# Patient Record
Sex: FEMALE | Race: BLACK OR AFRICAN AMERICAN | NOT HISPANIC OR LATINO | Employment: OTHER | URBAN - METROPOLITAN AREA
[De-identification: names, ages, dates, MRNs, and addresses within clinical notes are randomized per-mention and may not be internally consistent; named-entity substitution may affect disease eponyms.]

---

## 2022-02-09 ENCOUNTER — HOSPITAL ENCOUNTER (EMERGENCY)
Facility: HOSPITAL | Age: 57
Discharge: HOME | End: 2022-02-09
Attending: EMERGENCY MEDICINE | Admitting: EMERGENCY MEDICINE

## 2022-02-09 VITALS
DIASTOLIC BLOOD PRESSURE: 87 MMHG | OXYGEN SATURATION: 96 % | SYSTOLIC BLOOD PRESSURE: 139 MMHG | RESPIRATION RATE: 18 BRPM | HEART RATE: 72 BPM | TEMPERATURE: 96.8 F

## 2022-02-09 DIAGNOSIS — L02.213 ABSCESS OF PARASTERNAL CHEST WALL: Primary | ICD-10-CM

## 2022-02-09 DIAGNOSIS — U07.1 COVID-19: ICD-10-CM

## 2022-02-09 DIAGNOSIS — Z28.21 COVID-19 VACCINE DOSE DECLINED: ICD-10-CM

## 2022-02-09 PROCEDURE — 99282 EMERGENCY DEPT VISIT SF MDM: CPT

## 2022-02-09 RX ORDER — SULFAMETHOXAZOLE AND TRIMETHOPRIM 800; 160 MG/1; MG/1
1 TABLET ORAL 2 TIMES DAILY
Qty: 14 TABLET | Refills: 0 | Status: SHIPPED | OUTPATIENT
Start: 2022-02-09 | End: 2022-02-16

## 2022-02-09 RX ORDER — LIDOCAINE HYDROCHLORIDE AND EPINEPHRINE 10; 10 MG/ML; UG/ML
10 INJECTION, SOLUTION INFILTRATION; PERINEURAL ONCE
Status: COMPLETED | OUTPATIENT
Start: 2022-02-09 | End: 2022-02-09

## 2022-02-09 RX ADMIN — LIDOCAINE HYDROCHLORIDE,EPINEPHRINE BITARTRATE 10 ML: 10; .01 INJECTION, SOLUTION INFILTRATION; PERINEURAL at 16:10

## 2022-02-09 NOTE — DISCHARGE INSTRUCTIONS
Warm soaks to the chest wall 4-5 times a day for next 4 days    Take antibiotics as prescribed    Follow up with General Surgery for evaluation    Return Precautions    Although you are being discharged from the ED today, I encourage you to return for worsening symptoms.  Things can, and do, change such that treatment at home with medication may not be adequate.      Specifically, return for any of the following:    Chest pain, shortness of breath, pain or nausea and vomiting not controlled by medications provided.    Please make a follow up with your Primary Care Provider for a blood pressure recheck.

## 2022-02-09 NOTE — ED TRIAGE NOTES
Pt states that she has an abscess between her breasts that started 1 year ago and has gradually increased in size. States that it is now painful. Pt was COVID positive 5 days ago. Patient masked at arrival and triage staff wore all appropriate PPE during entire encounter with patient.

## 2022-02-09 NOTE — ED PROVIDER NOTES
"EMERGENCY DEPARTMENT ENCOUNTER    Room Number:  A01/01  Date seen:  2/9/2022  Time seen: 15:59 EST  PCP: Provider, No Known  Historian: patient    HPI:  Chief complaint: breast cyst  A complete HPI/ROS/PMH/PSH/SH/FH are unobtainable due to: n/a  Context:Yenny Crawford is a 56 y.o. female who presents to the ED with c/o an infected blackhead to the area beneath her breasts that has been a problem on and off.  States that over the past week it has become more painful, reddened and inflamed after she \"popped\" recently.  She has no h/o abnormal mammograms and her breasts are unaffected.  She has no other complaints. She mentions she was diagnosed with covid 19 five days ago but denies any problems breathing.     Patient was placed in face mask in first look. Patient was wearing facemask when I entered the room and throughout our encounter. I wore full protective equipment throughout this patient encounter including a N95 face mask, eye shield and gloves. Hand hygiene/washing of hands was performed before donning protective equipment and after removal when leaving the room.    MEDICAL RECORD REVIEW    ALLERGIES  Patient has no known allergies.    PAST MEDICAL HISTORY  Active Ambulatory Problems     Diagnosis Date Noted   • No Active Ambulatory Problems     Resolved Ambulatory Problems     Diagnosis Date Noted   • No Resolved Ambulatory Problems     No Additional Past Medical History       PAST SURGICAL HISTORY  No past surgical history on file.    FAMILY HISTORY  No family history on file.    SOCIAL HISTORY  Social History     Socioeconomic History   • Marital status:        REVIEW OF SYSTEMS  Review of Systems    All systems reviewed and negative except for those discussed in HPI.     PHYSICAL EXAM    ED Triage Vitals   Temp Heart Rate Resp BP SpO2   02/09/22 1455 02/09/22 1455 02/09/22 1455 02/09/22 1458 02/09/22 1455   96.8 °F (36 °C) 88 18 135/96 96 %      Temp src Heart Rate Source Patient Position BP Location " FiO2 (%)   02/09/22 1455 02/09/22 1455 02/09/22 1458 -- --   Tympanic Monitor Standing       Physical Exam  Chest:          Comments: Circular inflamed, erythematous blackhead that is fluctuant.         I have reviewed the triage vital signs and nursing notes.      GENERAL: not distressed, mildly anxious  HENT: nares patent  EYES: no scleral icterus  NECK: no ROM limitations  CV: regular rhythm, regular rate, no murmur, no rubs, no gallups  RESPIRATORY: normal effort, CTAB, able to speak in full sentences  ABDOMEN: soft  : deferred  MUSCULOSKELETAL: no deformity  NEURO: alert, moves all extremities, follows commands  SKIN: warm, dry    Incision & Drainage    Date/Time: 2/9/2022 4:27 PM  Performed by: Barby Mcmahan APRN  Authorized by: Slim Hough MD     Consent:     Consent obtained:  Verbal    Consent given by:  Patient    Risks discussed:  Bleeding, incomplete drainage, pain and infection    Alternatives discussed:  No treatment  Location:     Type:  Abscess    Location:  Trunk    Trunk location:  Chest  Pre-procedure details:     Skin preparation:  Chloraprep  Anesthesia (see MAR for exact dosages):     Anesthesia method:  Local infiltration    Local anesthetic:  Lidocaine 1% WITH epi  Procedure type:     Complexity:  Simple  Procedure details:     Incision types:  Stab incision    Incision depth:  Subcutaneous    Scalpel blade:  11    Wound management:  Probed and deloculated, irrigated with saline and extensive cleaning    Drainage:  Bloody and purulent    Drainage amount:  Moderate    Wound treatment:  Wound left open    Packing materials:  None  Post-procedure details:     Patient tolerance of procedure:  Tolerated well, no immediate complications          LAB RESULTS  No results found for this or any previous visit (from the past 24 hour(s)).      RADIOLOGY RESULTS  No Radiology Exams Resulted Within Past 24 Hours       PROGRESS, DATA ANALYSIS, CONSULTS AND MEDICAL DECISION MAKING  All labs  have been independently reviewed by me.  All radiology studies have been reviewed by me and discussed with radiologist dictating the report.  EKG's independently viewed and interpreted by me unless stated otherwise. Discussion below represents my analysis of pertinent findings related to patient's condition, differential diagnosis, treatment plan and final disposition.      DDX: infected sebaceous cyst, cellulitis, abscess chest wall    MDM: The patient had immediate relief after I lanced and drained and irrigated this abscess.  I did spend a lengthy time with her discussing the importance of follow-up with general surgery for formal excision.  I placed her on some Bactrim DS    Reviewed pt's history and workup with Dr. Hough.  After a bedside evaluation, Dr. Hough agrees with the plan of care.    The patient's history, physical exam, and lab findings were discussed with the physician, who also performed a face to face history and physical exam.  I discussed all results and noted any abnormalities with patient.  Discussed absoute need to recheck abnormalities with their family physician.  I answered any of the patient's questions.  Discussed plan for discharge, as there is no emergent indication for admission.  Pt is agreeable and understands need for follow up and repeat testing.  Pt is aware that discharge does not mean that nothing is wrong but it indicates no emergency is present and they must continue care with their family physician.  Pt is discharged with instructions to follow up with primary care doctor to have their blood pressure rechecked.         Disposition vitals:  /87   Pulse 72   Temp 96.8 °F (36 °C) (Tympanic)   Resp 18   SpO2 96%       DIAGNOSIS  Final diagnoses:   Abscess of parasternal chest wall   COVID-19   COVID-19 vaccine dose declined       FOLLOW UP   PATIENT CONNECTION - [facility] [id number] [phone]  Schedule an appointment as soon as possible for a  visit       Anuj Maria MD  [address] [place] [id number]  [phone]    Schedule an appointment as soon as possible for a visit in 1 week  for evaluation and consideration of formal excision of recurrent cyst         Barby Mcmahan, APRN  02/09/22 9628

## 2022-02-09 NOTE — ED NOTES
Pt c/o possible abscess to sternal area. Pt states that it started as black head approx 1.5yrs ago. Pt states that she attempted to pop it and it started to  Swell. Pt has redness to area    This RN wore mask and goggles during time of contact       Huyen Rowley, RN  02/09/22 1600

## 2023-08-15 ENCOUNTER — TRANSCRIBE ORDERS (OUTPATIENT)
Dept: ADMINISTRATIVE | Facility: HOSPITAL | Age: 58
End: 2023-08-15
Payer: COMMERCIAL

## 2023-08-15 DIAGNOSIS — M25.561 ACUTE PAIN OF BOTH KNEES: Primary | ICD-10-CM

## 2023-08-15 DIAGNOSIS — M25.562 ACUTE PAIN OF BOTH KNEES: Primary | ICD-10-CM

## 2023-08-25 ENCOUNTER — LAB (OUTPATIENT)
Dept: LAB | Facility: HOSPITAL | Age: 58
End: 2023-08-25
Payer: COMMERCIAL

## 2023-08-25 ENCOUNTER — HOSPITAL ENCOUNTER (OUTPATIENT)
Dept: NUCLEAR MEDICINE | Facility: HOSPITAL | Age: 58
Discharge: HOME | End: 2023-08-25
Payer: COMMERCIAL

## 2023-08-25 ENCOUNTER — TRANSCRIBE ORDERS (OUTPATIENT)
Dept: LAB | Facility: HOSPITAL | Age: 58
End: 2023-08-25
Payer: COMMERCIAL

## 2023-08-25 ENCOUNTER — APPOINTMENT (OUTPATIENT)
Dept: NUCLEAR MEDICINE | Facility: HOSPITAL | Age: 58
End: 2023-08-25
Payer: COMMERCIAL

## 2023-08-25 DIAGNOSIS — Z96.653 KNEE JOINT REPLACEMENT STATUS, BILATERAL: Primary | ICD-10-CM

## 2023-08-25 DIAGNOSIS — M25.562 ACUTE PAIN OF BOTH KNEES: ICD-10-CM

## 2023-08-25 DIAGNOSIS — Z96.653 KNEE JOINT REPLACEMENT STATUS, BILATERAL: ICD-10-CM

## 2023-08-25 DIAGNOSIS — M25.561 ACUTE PAIN OF BOTH KNEES: ICD-10-CM

## 2023-08-25 LAB
BASOPHILS # BLD AUTO: 0.05 10*3/MM3 (ref 0–0.2)
BASOPHILS NFR BLD AUTO: 0.6 % (ref 0–1.5)
CRP SERPL-MCNC: 0.41 MG/DL (ref 0–0.5)
DEPRECATED RDW RBC AUTO: 44.3 FL (ref 37–54)
EOSINOPHIL # BLD AUTO: 0.39 10*3/MM3 (ref 0–0.4)
EOSINOPHIL NFR BLD AUTO: 5 % (ref 0.3–6.2)
ERYTHROCYTE [DISTWIDTH] IN BLOOD BY AUTOMATED COUNT: 13.4 % (ref 12.3–15.4)
ERYTHROCYTE [SEDIMENTATION RATE] IN BLOOD: 15 MM/HR (ref 0–30)
HCT VFR BLD AUTO: 37.5 % (ref 34–46.6)
HGB BLD-MCNC: 12.6 G/DL (ref 12–15.9)
IMM GRANULOCYTES # BLD AUTO: 0.03 10*3/MM3 (ref 0–0.05)
IMM GRANULOCYTES NFR BLD AUTO: 0.4 % (ref 0–0.5)
LYMPHOCYTES # BLD AUTO: 2.94 10*3/MM3 (ref 0.7–3.1)
LYMPHOCYTES NFR BLD AUTO: 37.4 % (ref 19.6–45.3)
MCH RBC QN AUTO: 30.4 PG (ref 26.6–33)
MCHC RBC AUTO-ENTMCNC: 33.6 G/DL (ref 31.5–35.7)
MCV RBC AUTO: 90.4 FL (ref 79–97)
MONOCYTES # BLD AUTO: 0.66 10*3/MM3 (ref 0.1–0.9)
MONOCYTES NFR BLD AUTO: 8.4 % (ref 5–12)
NEUTROPHILS NFR BLD AUTO: 3.8 10*3/MM3 (ref 1.7–7)
NEUTROPHILS NFR BLD AUTO: 48.2 % (ref 42.7–76)
NRBC BLD AUTO-RTO: 0 /100 WBC (ref 0–0.2)
PLATELET # BLD AUTO: 198 10*3/MM3 (ref 140–450)
PMV BLD AUTO: 9.6 FL (ref 6–12)
PROCALCITONIN SERPL-MCNC: 0.03 NG/ML (ref 0–0.25)
RBC # BLD AUTO: 4.15 10*6/MM3 (ref 3.77–5.28)
WBC NRBC COR # BLD: 7.87 10*3/MM3 (ref 3.4–10.8)

## 2023-08-25 PROCEDURE — 84145 PROCALCITONIN (PCT): CPT

## 2023-08-25 PROCEDURE — A9503 TC99M MEDRONATE: HCPCS | Performed by: STUDENT IN AN ORGANIZED HEALTH CARE EDUCATION/TRAINING PROGRAM

## 2023-08-25 PROCEDURE — 0 TECHNETIUM MEDRONATE KIT: Performed by: STUDENT IN AN ORGANIZED HEALTH CARE EDUCATION/TRAINING PROGRAM

## 2023-08-25 PROCEDURE — 85652 RBC SED RATE AUTOMATED: CPT

## 2023-08-25 PROCEDURE — 36415 COLL VENOUS BLD VENIPUNCTURE: CPT

## 2023-08-25 PROCEDURE — 85025 COMPLETE CBC W/AUTO DIFF WBC: CPT

## 2023-08-25 PROCEDURE — 83529 ASAY OF INTERLEUKIN-6 (IL-6): CPT

## 2023-08-25 PROCEDURE — 78315 BONE IMAGING 3 PHASE: CPT

## 2023-08-25 PROCEDURE — 86140 C-REACTIVE PROTEIN: CPT

## 2023-08-25 RX ORDER — TC 99M MEDRONATE 20 MG/10ML
22 INJECTION, POWDER, LYOPHILIZED, FOR SOLUTION INTRAVENOUS
Status: COMPLETED | OUTPATIENT
Start: 2023-08-25 | End: 2023-08-25

## 2023-08-25 RX ADMIN — Medication 22 MILLICURIE: at 08:43

## 2023-08-27 LAB — IL6 SERPL-MCNC: 7.6 PG/ML (ref 0–13)

## 2023-09-29 ENCOUNTER — HOSPITAL ENCOUNTER (OUTPATIENT)
Dept: GENERAL RADIOLOGY | Facility: HOSPITAL | Age: 58
Discharge: HOME | End: 2023-09-29
Payer: COMMERCIAL

## 2023-09-29 ENCOUNTER — PRE-ADMISSION TESTING (OUTPATIENT)
Dept: PREADMISSION TESTING | Facility: HOSPITAL | Age: 58
DRG: 468 | End: 2023-09-29
Payer: COMMERCIAL

## 2023-09-29 VITALS
WEIGHT: 205 LBS | DIASTOLIC BLOOD PRESSURE: 91 MMHG | BODY MASS INDEX: 31.07 KG/M2 | RESPIRATION RATE: 18 BRPM | TEMPERATURE: 97.4 F | HEART RATE: 88 BPM | OXYGEN SATURATION: 97 % | HEIGHT: 68 IN | SYSTOLIC BLOOD PRESSURE: 131 MMHG

## 2023-09-29 LAB
ALBUMIN SERPL-MCNC: 4.1 G/DL (ref 3.5–5.2)
ALBUMIN/GLOB SERPL: 1.6 G/DL
ALP SERPL-CCNC: 88 U/L (ref 39–117)
ALT SERPL W P-5'-P-CCNC: 12 U/L (ref 1–33)
ANION GAP SERPL CALCULATED.3IONS-SCNC: 10 MMOL/L (ref 5–15)
APTT PPP: 31.2 SECONDS (ref 22.7–35.4)
AST SERPL-CCNC: 15 U/L (ref 1–32)
BACTERIA UR QL AUTO: ABNORMAL /HPF
BILIRUB SERPL-MCNC: 0.3 MG/DL (ref 0–1.2)
BILIRUB UR QL STRIP: NEGATIVE
BUN SERPL-MCNC: 12 MG/DL (ref 6–20)
BUN/CREAT SERPL: 18.8 (ref 7–25)
CALCIUM SPEC-SCNC: 9.3 MG/DL (ref 8.6–10.5)
CHLORIDE SERPL-SCNC: 105 MMOL/L (ref 98–107)
CLARITY UR: CLEAR
CO2 SERPL-SCNC: 24 MMOL/L (ref 22–29)
COLOR UR: YELLOW
CREAT SERPL-MCNC: 0.64 MG/DL (ref 0.57–1)
DEPRECATED RDW RBC AUTO: 41.1 FL (ref 37–54)
EGFRCR SERPLBLD CKD-EPI 2021: 102.6 ML/MIN/1.73
ERYTHROCYTE [DISTWIDTH] IN BLOOD BY AUTOMATED COUNT: 12.8 % (ref 12.3–15.4)
GLOBULIN UR ELPH-MCNC: 2.6 GM/DL
GLUCOSE SERPL-MCNC: 115 MG/DL (ref 65–99)
GLUCOSE UR STRIP-MCNC: ABNORMAL MG/DL
HBA1C MFR BLD: 5.8 % (ref 4.8–5.6)
HCT VFR BLD AUTO: 39.7 % (ref 34–46.6)
HGB BLD-MCNC: 13.2 G/DL (ref 12–15.9)
HGB UR QL STRIP.AUTO: NEGATIVE
HYALINE CASTS UR QL AUTO: ABNORMAL /LPF
INR PPP: 1.01 (ref 0.9–1.1)
KETONES UR QL STRIP: NEGATIVE
LEUKOCYTE ESTERASE UR QL STRIP.AUTO: ABNORMAL
MCH RBC QN AUTO: 29.4 PG (ref 26.6–33)
MCHC RBC AUTO-ENTMCNC: 33.2 G/DL (ref 31.5–35.7)
MCV RBC AUTO: 88.4 FL (ref 79–97)
NITRITE UR QL STRIP: NEGATIVE
PH UR STRIP.AUTO: 7 [PH] (ref 5–8)
PLATELET # BLD AUTO: 207 10*3/MM3 (ref 140–450)
PMV BLD AUTO: 9.4 FL (ref 6–12)
POTASSIUM SERPL-SCNC: 4.3 MMOL/L (ref 3.5–5.2)
PROT SERPL-MCNC: 6.7 G/DL (ref 6–8.5)
PROT UR QL STRIP: NEGATIVE
PROTHROMBIN TIME: 13.4 SECONDS (ref 11.7–14.2)
RBC # BLD AUTO: 4.49 10*6/MM3 (ref 3.77–5.28)
RBC # UR STRIP: ABNORMAL /HPF
REF LAB TEST METHOD: ABNORMAL
SODIUM SERPL-SCNC: 139 MMOL/L (ref 136–145)
SP GR UR STRIP: 1.01 (ref 1–1.03)
SQUAMOUS #/AREA URNS HPF: ABNORMAL /HPF
UROBILINOGEN UR QL STRIP: ABNORMAL
WBC # UR STRIP: ABNORMAL /HPF
WBC NRBC COR # BLD: 8.31 10*3/MM3 (ref 3.4–10.8)

## 2023-09-29 PROCEDURE — 83036 HEMOGLOBIN GLYCOSYLATED A1C: CPT

## 2023-09-29 PROCEDURE — 73560 X-RAY EXAM OF KNEE 1 OR 2: CPT

## 2023-09-29 PROCEDURE — 81001 URINALYSIS AUTO W/SCOPE: CPT

## 2023-09-29 PROCEDURE — 85027 COMPLETE CBC AUTOMATED: CPT

## 2023-09-29 PROCEDURE — 80053 COMPREHEN METABOLIC PANEL: CPT

## 2023-09-29 PROCEDURE — 85730 THROMBOPLASTIN TIME PARTIAL: CPT

## 2023-09-29 PROCEDURE — 85610 PROTHROMBIN TIME: CPT

## 2023-09-29 PROCEDURE — 36415 COLL VENOUS BLD VENIPUNCTURE: CPT

## 2023-09-29 PROCEDURE — 93010 ELECTROCARDIOGRAM REPORT: CPT | Performed by: INTERNAL MEDICINE

## 2023-09-29 PROCEDURE — 71046 X-RAY EXAM CHEST 2 VIEWS: CPT

## 2023-09-29 PROCEDURE — 93005 ELECTROCARDIOGRAM TRACING: CPT

## 2023-09-29 RX ORDER — PSEUDOEPHEDRINE HCL 30 MG
100 TABLET ORAL 2 TIMES DAILY
COMMUNITY
Start: 2023-09-21

## 2023-09-29 RX ORDER — LORATADINE 10 MG/1
10 TABLET ORAL DAILY
COMMUNITY
Start: 2023-09-21

## 2023-09-29 RX ORDER — EMPAGLIFLOZIN 10 MG/1
1 TABLET, FILM COATED ORAL DAILY
COMMUNITY
Start: 2023-09-21

## 2023-09-29 RX ORDER — SIMVASTATIN 10 MG
10 TABLET ORAL NIGHTLY
COMMUNITY
Start: 2023-09-21

## 2023-09-29 RX ORDER — ACYCLOVIR 400 MG/1
400 TABLET ORAL DAILY PRN
COMMUNITY
Start: 2023-08-23

## 2023-09-29 RX ORDER — AMOXICILLIN 500 MG
1200 CAPSULE ORAL
COMMUNITY
Start: 2023-08-21

## 2023-09-29 RX ORDER — GABAPENTIN 600 MG/1
600 TABLET ORAL 3 TIMES DAILY
COMMUNITY
Start: 2023-08-19

## 2023-09-29 RX ORDER — ALBUTEROL SULFATE 90 UG/1
2 AEROSOL, METERED RESPIRATORY (INHALATION) EVERY 4 HOURS PRN
COMMUNITY
Start: 2023-09-21

## 2023-09-29 RX ORDER — NITROGLYCERIN 0.4 MG/1
0.4 TABLET SUBLINGUAL
COMMUNITY
Start: 2023-09-21

## 2023-09-29 RX ORDER — TIZANIDINE HYDROCHLORIDE 2 MG/1
2 CAPSULE, GELATIN COATED ORAL 3 TIMES DAILY
COMMUNITY

## 2023-09-29 NOTE — DISCHARGE INSTRUCTIONS
Arrive to hospital on your day of surgery at 9AM    Take the following medications the morning of surgery: USE YOUR SPIRIVA INHALER AND BRING YOUR ALBUTEROL INHALER WITH YOU TO HOSPITAL. TAKE YOUR GABAPENTIN AND METOPROLOL      If you are on prescription narcotic pain medication to control your pain you may also take that medication the morning of surgery.    General Instructions:  Do not eat solid food after midnight the night before surgery.  You may drink clear liquids day of surgery but must stop at least one hour before your hospital arrival time.  It is beneficial for you to have a clear drink that contains carbohydrates the day of surgery.  We suggest a 12 to 20 ounce bottle of Gatorade or Powerade for non-diabetic patients or a 12 to 20 ounce bottle of G2 or Powerade Zero for diabetic patients. (Pediatric patients, are not advised to drink a 12 to 20 ounce carbohydrate drink)    Clear liquids are liquids you can see through.  Nothing red in color.     Plain water                               Sports drinks  Sodas                                   Gelatin (Jell-O)  Fruit juices without pulp such as white grape juice and apple juice  Popsicles that contain no fruit or yogurt  Tea or coffee (no cream or milk added)  Gatorade / Powerade  G2 / Powerade Zero    Infants may have breast milk up to four hours before surgery.  Infants drinking formula may drink formula up to six hours before surgery.   Patients who avoid smoking, chewing tobacco and alcohol for 4 weeks prior to surgery have a reduced risk of post-operative complications.  Quit smoking as many days before surgery as you can.  Do not smoke, use chewing tobacco or drink alcohol the day of surgery.   If applicable bring your C-PAP/ BI-PAP machine in with you to preop day of surgery.  Bring any papers given to you in the doctor’s office.  Wear clean comfortable clothes.  Do not wear contact lenses, false eyelashes or make-up.  Bring a case for your  glasses.   Bring crutches or walker if applicable.  Remove all piercings.  Leave jewelry and any other valuables at home.  Hair extensions with metal clips must be removed prior to surgery.  The Pre-Admission Testing nurse will instruct you to bring medications if unable to obtain an accurate list in Pre-Admission Testing.        If you were given a blood bank ID arm band remember to bring it with you the day of surgery.    Preventing a Surgical Site Infection:  For 2 to 3 days before surgery, avoid shaving with a razor because the razor can irritate skin and make it easier to develop an infection.    Any areas of open skin can increase the risk of a post-operative wound infection by allowing bacteria to enter and travel throughout the body.  Notify your surgeon if you have any skin wounds / rashes even if it is not near the expected surgical site.  The area will need assessed to determine if surgery should be delayed until it is healed.  The night prior to surgery shower using a fresh bar of anti-bacterial soap (such as Dial) and clean washcloth.  Sleep in a clean bed with clean clothing.  Do not allow pets to sleep with you.  Shower on the morning of surgery using a fresh bar of anti-bacterial soap (such as Dial) and clean washcloth.  Dry with a clean towel and dress in clean clothing.  Ask your surgeon if you will be receiving antibiotics prior to surgery.  Make sure you, your family, and all healthcare providers clean their hands with soap and water or an alcohol based hand  before caring for you or your wound.    Day of surgery:  Your arrival time is approximately two hours before your scheduled surgery time.  Upon arrival, a Pre-op nurse and Anesthesiologist will review your health history, obtain vital signs, and answer questions you may have.  The only belongings needed at this time will be a list of your home medications and if applicable your C-PAP/BI-PAP machine.  A Pre-op nurse will start an IV  and you may receive medication in preparation for surgery, including something to help you relax.     Please be aware that surgery does come with discomfort.  We want to make every effort to control your discomfort so please discuss any uncontrolled symptoms with your nurse.   Your doctor will most likely have prescribed pain medications.      If you are going home after surgery you will receive individualized written care instructions before being discharged.  A responsible adult must drive you to and from the hospital on the day of your surgery and stay with you for 24 hours.  Discharge prescriptions can be filled by the hospital pharmacy during regular pharmacy hours.  If you are having surgery late in the day/evening your prescription may be e-prescribed to your pharmacy.  Please verify your pharmacy hours or chose a 24 hour pharmacy to avoid not having access to your prescription because your pharmacy has closed for the day.    If you are staying overnight following surgery, you will be transported to your hospital room following the recovery period.  [facility] has all private rooms.    If you have any questions please call Pre-Admission Testing at [phone].  Deductibles and co-payments are collected on the day of service. Please be prepared to pay the required co-pay, deductible or deposit on the day of service as defined by your plan.    Call your surgeon immediately if you experience any of the following symptoms:  Sore Throat  Shortness of Breath or difficulty breathing  Cough  Chills  Body soreness or muscle pain  Headache  Fever  New loss of taste or smell  Do not arrive for your surgery ill.  Your procedure will need to be rescheduled to another time.  You will need to call your physician before the day of surgery to avoid any unnecessary exposure to hospital staff as well as other patients.          CHLORHEXIDINE CLOTH INSTRUCTIONS  The morning of surgery follow these instructions  using the Chlorhexidine cloths you've been given.  These steps reduce bacteria on the body.  Do not use the cloths near your eyes, ears mouth, genitalia or on open wounds.  Throw the cloths away after use but do not try to flush them down a toilet.      Open and remove one cloth at a time from the package.    Leave the cloth unfolded and begin the bathing.  Massage the skin with the cloths using gentle pressure to remove bacteria.  Do not scrub harshly.   Follow the steps below with one 2% CHG cloth per area (6 total cloths).  One cloth for neck, shoulders and chest.  One cloth for both arms, hands, fingers and underarms (do underarms last).  One cloth for the abdomen followed by groin.  One cloth for right leg and foot including between the toes.  One cloth for left leg and foot including between the toes.  The last cloth is to be used for the back of the neck, back and buttocks.    Allow the CHG to air dry 3 minutes on the skin which will give it time to work and decrease the chance of irritation.  The skin may feel sticky until it is dry.  Do not rinse with water or any other liquid or you will lose the beneficial effects of the CHG.  If mild skin irritation occurs, do rinse the skin to remove the CHG.  Report this to the nurse at time of admission.  Do not apply lotions, creams, ointments, deodorants or perfumes after using the clothes. Dress in clean clothes before coming to the hospital.

## 2023-10-01 LAB
QT INTERVAL: 422 MS
QTC INTERVAL: 449 MS

## 2023-10-02 ENCOUNTER — ANESTHESIA (OUTPATIENT)
Dept: PERIOP | Facility: HOSPITAL | Age: 58
DRG: 468 | End: 2023-10-02
Payer: COMMERCIAL

## 2023-10-02 ENCOUNTER — ANESTHESIA EVENT (OUTPATIENT)
Dept: PERIOP | Facility: HOSPITAL | Age: 58
DRG: 468 | End: 2023-10-02
Payer: COMMERCIAL

## 2023-10-02 ENCOUNTER — HOSPITAL ENCOUNTER (INPATIENT)
Facility: HOSPITAL | Age: 58
LOS: 1 days | Discharge: HOME | DRG: 468 | End: 2023-10-03
Attending: ORTHOPAEDIC SURGERY | Admitting: ORTHOPAEDIC SURGERY
Payer: COMMERCIAL

## 2023-10-02 ENCOUNTER — APPOINTMENT (OUTPATIENT)
Dept: GENERAL RADIOLOGY | Facility: HOSPITAL | Age: 58
DRG: 468 | End: 2023-10-02
Payer: COMMERCIAL

## 2023-10-02 DIAGNOSIS — Z96.659 FAILED TOTAL KNEE ARTHROPLASTY: ICD-10-CM

## 2023-10-02 DIAGNOSIS — T84.018A FAILED TOTAL KNEE ARTHROPLASTY: ICD-10-CM

## 2023-10-02 LAB
GLUCOSE BLDC GLUCOMTR-MCNC: 118 MG/DL (ref 70–130)
GLUCOSE BLDC GLUCOMTR-MCNC: 127 MG/DL (ref 70–130)
GLUCOSE BLDC GLUCOMTR-MCNC: 133 MG/DL (ref 70–130)
GLUCOSE BLDC GLUCOMTR-MCNC: 148 MG/DL (ref 70–130)

## 2023-10-02 PROCEDURE — C1776 JOINT DEVICE (IMPLANTABLE): HCPCS | Performed by: ORTHOPAEDIC SURGERY

## 2023-10-02 PROCEDURE — 25010000002 DEXAMETHASONE PER 1 MG: Performed by: NURSE ANESTHETIST, CERTIFIED REGISTERED

## 2023-10-02 PROCEDURE — 87070 CULTURE OTHR SPECIMN AEROBIC: CPT | Performed by: ORTHOPAEDIC SURGERY

## 2023-10-02 PROCEDURE — G0378 HOSPITAL OBSERVATION PER HR: HCPCS

## 2023-10-02 PROCEDURE — C1713 ANCHOR/SCREW BN/BN,TIS/BN: HCPCS | Performed by: ORTHOPAEDIC SURGERY

## 2023-10-02 PROCEDURE — 25010000002 MIDAZOLAM PER 1 MG: Performed by: ANESTHESIOLOGY

## 2023-10-02 PROCEDURE — 25010000002 CLONIDINE PER 1 MG: Performed by: ORTHOPAEDIC SURGERY

## 2023-10-02 PROCEDURE — 87075 CULTR BACTERIA EXCEPT BLOOD: CPT | Performed by: ORTHOPAEDIC SURGERY

## 2023-10-02 PROCEDURE — 25010000002 SUGAMMADEX 200 MG/2ML SOLUTION: Performed by: NURSE ANESTHETIST, CERTIFIED REGISTERED

## 2023-10-02 PROCEDURE — 25010000002 ROPIVACAINE PER 1 MG: Performed by: ORTHOPAEDIC SURGERY

## 2023-10-02 PROCEDURE — 25010000002 FENTANYL CITRATE (PF) 100 MCG/2ML SOLUTION: Performed by: NURSE ANESTHETIST, CERTIFIED REGISTERED

## 2023-10-02 PROCEDURE — 25010000002 KETOROLAC TROMETHAMINE PER 15 MG: Performed by: ORTHOPAEDIC SURGERY

## 2023-10-02 PROCEDURE — 25010000002 FENTANYL CITRATE (PF) 50 MCG/ML SOLUTION: Performed by: NURSE ANESTHETIST, CERTIFIED REGISTERED

## 2023-10-02 PROCEDURE — 87205 SMEAR GRAM STAIN: CPT | Performed by: ORTHOPAEDIC SURGERY

## 2023-10-02 PROCEDURE — 25010000002 EPINEPHRINE 1 MG/ML SOLUTION 30 ML VIAL: Performed by: ORTHOPAEDIC SURGERY

## 2023-10-02 PROCEDURE — 73560 X-RAY EXAM OF KNEE 1 OR 2: CPT

## 2023-10-02 PROCEDURE — 0SPD0JZ REMOVAL OF SYNTHETIC SUBSTITUTE FROM LEFT KNEE JOINT, OPEN APPROACH: ICD-10-PCS | Performed by: ORTHOPAEDIC SURGERY

## 2023-10-02 PROCEDURE — 25810000003 LACTATED RINGERS PER 1000 ML: Performed by: ANESTHESIOLOGY

## 2023-10-02 PROCEDURE — 25010000002 HYDROMORPHONE 1 MG/ML SOLUTION: Performed by: NURSE ANESTHETIST, CERTIFIED REGISTERED

## 2023-10-02 PROCEDURE — 25010000002 CEFAZOLIN IN DEXTROSE 2-4 GM/100ML-% SOLUTION: Performed by: ORTHOPAEDIC SURGERY

## 2023-10-02 PROCEDURE — 25010000002 ONDANSETRON PER 1 MG: Performed by: NURSE ANESTHETIST, CERTIFIED REGISTERED

## 2023-10-02 PROCEDURE — 25010000002 VANCOMYCIN 1 G RECONSTITUTED SOLUTION: Performed by: ORTHOPAEDIC SURGERY

## 2023-10-02 PROCEDURE — 25010000002 PROPOFOL 10 MG/ML EMULSION: Performed by: NURSE ANESTHETIST, CERTIFIED REGISTERED

## 2023-10-02 PROCEDURE — 0SRD069 REPLACEMENT OF LEFT KNEE JOINT WITH OXIDIZED ZIRCONIUM ON POLYETHYLENE SYNTHETIC SUBSTITUTE, CEMENTED, OPEN APPROACH: ICD-10-PCS | Performed by: ORTHOPAEDIC SURGERY

## 2023-10-02 PROCEDURE — 25810000003 SODIUM CHLORIDE 0.9 % SOLUTION: Performed by: ORTHOPAEDIC SURGERY

## 2023-10-02 PROCEDURE — 82948 REAGENT STRIP/BLOOD GLUCOSE: CPT

## 2023-10-02 DEVICE — LEGION  REV TIBIA BASEPLATE SZ 3 LEFT
Type: IMPLANTABLE DEVICE | Site: KNEE | Status: FUNCTIONAL
Brand: LEGION

## 2023-10-02 DEVICE — LEGION REVISION / HINGE HEMI                                    STEPPED TIBIAL WEDGE SIZE 3-4 LEFT                                    MEDIAL/RIGHT LATERAL 5MM
Type: IMPLANTABLE DEVICE | Site: KNEE | Status: FUNCTIONAL
Brand: LEGION

## 2023-10-02 DEVICE — LEGION SCREW-ON DISTAL FEMORAL                                    WEDGE SIZE 6 5MM
Type: IMPLANTABLE DEVICE | Site: KNEE | Status: FUNCTIONAL
Brand: LEGION

## 2023-10-02 DEVICE — LEGION CEMENTED STEM 12MM X 120MM STRAIGHT
Type: IMPLANTABLE DEVICE | Site: KNEE | Status: FUNCTIONAL
Brand: LEGION

## 2023-10-02 DEVICE — DEV CONTRL TISS STRATAFIX SPIRAL MNCRYL UD 3/0 PLS 30CM: Type: IMPLANTABLE DEVICE | Site: KNEE | Status: FUNCTIONAL

## 2023-10-02 DEVICE — LEGION CEMENTED STEM 14MM X 120MM STRAIGHT
Type: IMPLANTABLE DEVICE | Site: KNEE | Status: FUNCTIONAL
Brand: LEGION

## 2023-10-02 DEVICE — LEGION TIB CONE ID 18 SHORT
Type: IMPLANTABLE DEVICE | Site: KNEE | Status: FUNCTIONAL
Brand: LEGION

## 2023-10-02 DEVICE — LEGION RK/HK HEMI SZ 3-4 LTLA/RTMD 5MM
Type: IMPLANTABLE DEVICE | Site: KNEE | Status: FUNCTIONAL
Brand: LEGION

## 2023-10-02 DEVICE — LEGION OXINIUM CONSTRAINED FEMORAL                                    SIZE 6 LEFT
Type: IMPLANTABLE DEVICE | Site: KNEE | Status: FUNCTIONAL
Brand: LEGION

## 2023-10-02 DEVICE — DEV CONTRL TISS STRATAFIX SYMM PDS PLUS VIL CT-1 60CM: Type: IMPLANTABLE DEVICE | Site: KNEE | Status: FUNCTIONAL

## 2023-10-02 DEVICE — LEGION POSTERIOR STABILIZED HIGH                                    FLEX HIGHLY CROSS LINKED                                    POLYETHYLENE SIZE 3-4 15MM
Type: IMPLANTABLE DEVICE | Site: KNEE | Status: FUNCTIONAL
Brand: LEGION

## 2023-10-02 DEVICE — PALACOS® R IS A FAST-CURING, RADIOPAQUE, POLY(METHYL METHACRYLATE)-BASED BONE CEMENT.PALACOS ® R CONTAINS THE X-RAY CONTRAST MEDIUM ZIRCONIUM DIOXIDE. TO IMPROVE VISIBILITY IN THE SURGICAL FIELD PALACOS ® R HAS BEEN COLOURED WITH CHLOROPHYLL (E141). THE BONE CEMENT IS PREPARED DIRECTLY BEFORE USE BY MIXING A POLYMER POWDER COMPONENT WITH A LIQUID MONOMER COMPONENT. A DUCTILE DOUGH FORMS WHICH CURES WITHIN A FEW MINUTES.
Type: IMPLANTABLE DEVICE | Site: KNEE | Status: FUNCTIONAL
Brand: PALACOS®

## 2023-10-02 RX ORDER — HYDROCODONE BITARTRATE AND ACETAMINOPHEN 5; 325 MG/1; MG/1
1 TABLET ORAL ONCE AS NEEDED
Status: DISCONTINUED | OUTPATIENT
Start: 2023-10-02 | End: 2023-10-02 | Stop reason: HOSPADM

## 2023-10-02 RX ORDER — FENTANYL CITRATE 50 UG/ML
INJECTION, SOLUTION INTRAMUSCULAR; INTRAVENOUS AS NEEDED
Status: DISCONTINUED | OUTPATIENT
Start: 2023-10-02 | End: 2023-10-02 | Stop reason: SURG

## 2023-10-02 RX ORDER — OXYCODONE HYDROCHLORIDE AND ACETAMINOPHEN 5; 325 MG/1; MG/1
2 TABLET ORAL EVERY 4 HOURS PRN
Status: DISCONTINUED | OUTPATIENT
Start: 2023-10-02 | End: 2023-10-03 | Stop reason: HOSPADM

## 2023-10-02 RX ORDER — MELOXICAM 15 MG/1
15 TABLET ORAL DAILY
Status: DISCONTINUED | OUTPATIENT
Start: 2023-10-03 | End: 2023-10-03 | Stop reason: HOSPADM

## 2023-10-02 RX ORDER — DOCUSATE SODIUM 100 MG/1
100 CAPSULE, LIQUID FILLED ORAL 2 TIMES DAILY PRN
Status: DISCONTINUED | OUTPATIENT
Start: 2023-10-02 | End: 2023-10-03 | Stop reason: HOSPADM

## 2023-10-02 RX ORDER — DROPERIDOL 2.5 MG/ML
0.62 INJECTION, SOLUTION INTRAMUSCULAR; INTRAVENOUS
Status: DISCONTINUED | OUTPATIENT
Start: 2023-10-02 | End: 2023-10-02 | Stop reason: HOSPADM

## 2023-10-02 RX ORDER — ASPIRIN 81 MG/1
81 TABLET ORAL EVERY 12 HOURS SCHEDULED
Status: DISCONTINUED | OUTPATIENT
Start: 2023-10-03 | End: 2023-10-03 | Stop reason: HOSPADM

## 2023-10-02 RX ORDER — IBUPROFEN 600 MG/1
1 TABLET ORAL
Status: DISCONTINUED | OUTPATIENT
Start: 2023-10-02 | End: 2023-10-03 | Stop reason: HOSPADM

## 2023-10-02 RX ORDER — ONDANSETRON 2 MG/ML
4 INJECTION INTRAMUSCULAR; INTRAVENOUS ONCE AS NEEDED
Status: DISCONTINUED | OUTPATIENT
Start: 2023-10-02 | End: 2023-10-02 | Stop reason: HOSPADM

## 2023-10-02 RX ORDER — HYDRALAZINE HYDROCHLORIDE 20 MG/ML
5 INJECTION INTRAMUSCULAR; INTRAVENOUS
Status: DISCONTINUED | OUTPATIENT
Start: 2023-10-02 | End: 2023-10-02 | Stop reason: HOSPADM

## 2023-10-02 RX ORDER — FAMOTIDINE 20 MG/1
40 TABLET, FILM COATED ORAL DAILY
Status: DISCONTINUED | OUTPATIENT
Start: 2023-10-02 | End: 2023-10-03 | Stop reason: HOSPADM

## 2023-10-02 RX ORDER — LABETALOL HYDROCHLORIDE 5 MG/ML
5 INJECTION, SOLUTION INTRAVENOUS
Status: DISCONTINUED | OUTPATIENT
Start: 2023-10-02 | End: 2023-10-02 | Stop reason: HOSPADM

## 2023-10-02 RX ORDER — ONDANSETRON 2 MG/ML
4 INJECTION INTRAMUSCULAR; INTRAVENOUS EVERY 6 HOURS PRN
Status: DISCONTINUED | OUTPATIENT
Start: 2023-10-02 | End: 2023-10-03 | Stop reason: HOSPADM

## 2023-10-02 RX ORDER — DIPHENHYDRAMINE HCL 25 MG
50 CAPSULE ORAL EVERY 6 HOURS PRN
Status: DISCONTINUED | OUTPATIENT
Start: 2023-10-02 | End: 2023-10-03 | Stop reason: HOSPADM

## 2023-10-02 RX ORDER — HYDROMORPHONE HYDROCHLORIDE 1 MG/ML
0.5 INJECTION, SOLUTION INTRAMUSCULAR; INTRAVENOUS; SUBCUTANEOUS
Status: DISCONTINUED | OUTPATIENT
Start: 2023-10-02 | End: 2023-10-02 | Stop reason: HOSPADM

## 2023-10-02 RX ORDER — ONDANSETRON 2 MG/ML
INJECTION INTRAMUSCULAR; INTRAVENOUS AS NEEDED
Status: DISCONTINUED | OUTPATIENT
Start: 2023-10-02 | End: 2023-10-02 | Stop reason: SURG

## 2023-10-02 RX ORDER — INSULIN LISPRO 100 [IU]/ML
2-7 INJECTION, SOLUTION INTRAVENOUS; SUBCUTANEOUS
Status: DISCONTINUED | OUTPATIENT
Start: 2023-10-02 | End: 2023-10-03 | Stop reason: HOSPADM

## 2023-10-02 RX ORDER — IPRATROPIUM BROMIDE AND ALBUTEROL SULFATE 2.5; .5 MG/3ML; MG/3ML
3 SOLUTION RESPIRATORY (INHALATION) ONCE AS NEEDED
Status: DISCONTINUED | OUTPATIENT
Start: 2023-10-02 | End: 2023-10-02 | Stop reason: HOSPADM

## 2023-10-02 RX ORDER — SODIUM CHLORIDE 0.9 % (FLUSH) 0.9 %
3 SYRINGE (ML) INJECTION EVERY 12 HOURS SCHEDULED
Status: DISCONTINUED | OUTPATIENT
Start: 2023-10-02 | End: 2023-10-02 | Stop reason: HOSPADM

## 2023-10-02 RX ORDER — CEFAZOLIN SODIUM 2 G/100ML
2000 INJECTION, SOLUTION INTRAVENOUS ONCE
Status: COMPLETED | OUTPATIENT
Start: 2023-10-02 | End: 2023-10-02

## 2023-10-02 RX ORDER — PREGABALIN 75 MG/1
150 CAPSULE ORAL ONCE
Status: COMPLETED | OUTPATIENT
Start: 2023-10-02 | End: 2023-10-02

## 2023-10-02 RX ORDER — SODIUM CHLORIDE 9 MG/ML
INJECTION, SOLUTION INTRAVENOUS AS NEEDED
Status: DISCONTINUED | OUTPATIENT
Start: 2023-10-02 | End: 2023-10-02 | Stop reason: HOSPADM

## 2023-10-02 RX ORDER — OXYCODONE AND ACETAMINOPHEN 7.5; 325 MG/1; MG/1
1 TABLET ORAL EVERY 4 HOURS PRN
Status: DISCONTINUED | OUTPATIENT
Start: 2023-10-02 | End: 2023-10-02 | Stop reason: HOSPADM

## 2023-10-02 RX ORDER — GABAPENTIN 300 MG/1
600 CAPSULE ORAL EVERY 8 HOURS SCHEDULED
Status: DISCONTINUED | OUTPATIENT
Start: 2023-10-02 | End: 2023-10-03 | Stop reason: HOSPADM

## 2023-10-02 RX ORDER — FAMOTIDINE 10 MG/ML
20 INJECTION, SOLUTION INTRAVENOUS ONCE
Status: COMPLETED | OUTPATIENT
Start: 2023-10-02 | End: 2023-10-02

## 2023-10-02 RX ORDER — PROPOFOL 10 MG/ML
VIAL (ML) INTRAVENOUS AS NEEDED
Status: DISCONTINUED | OUTPATIENT
Start: 2023-10-02 | End: 2023-10-02 | Stop reason: SURG

## 2023-10-02 RX ORDER — ONDANSETRON 4 MG/1
4 TABLET, FILM COATED ORAL EVERY 6 HOURS PRN
Status: DISCONTINUED | OUTPATIENT
Start: 2023-10-02 | End: 2023-10-03 | Stop reason: HOSPADM

## 2023-10-02 RX ORDER — LIDOCAINE HYDROCHLORIDE 20 MG/ML
INJECTION, SOLUTION INFILTRATION; PERINEURAL AS NEEDED
Status: DISCONTINUED | OUTPATIENT
Start: 2023-10-02 | End: 2023-10-02 | Stop reason: SURG

## 2023-10-02 RX ORDER — FENTANYL CITRATE 50 UG/ML
50 INJECTION, SOLUTION INTRAMUSCULAR; INTRAVENOUS
Status: DISCONTINUED | OUTPATIENT
Start: 2023-10-02 | End: 2023-10-02 | Stop reason: HOSPADM

## 2023-10-02 RX ORDER — MIDAZOLAM HYDROCHLORIDE 1 MG/ML
1 INJECTION INTRAMUSCULAR; INTRAVENOUS
Status: DISCONTINUED | OUTPATIENT
Start: 2023-10-02 | End: 2023-10-02 | Stop reason: HOSPADM

## 2023-10-02 RX ORDER — TIZANIDINE 4 MG/1
2 TABLET ORAL EVERY 6 HOURS SCHEDULED
Status: DISCONTINUED | OUTPATIENT
Start: 2023-10-02 | End: 2023-10-03 | Stop reason: HOSPADM

## 2023-10-02 RX ORDER — CEFAZOLIN SODIUM 2 G/100ML
2 INJECTION, SOLUTION INTRAVENOUS EVERY 8 HOURS
Status: COMPLETED | OUTPATIENT
Start: 2023-10-02 | End: 2023-10-03

## 2023-10-02 RX ORDER — GLYCOPYRROLATE 0.2 MG/ML
INJECTION INTRAMUSCULAR; INTRAVENOUS AS NEEDED
Status: DISCONTINUED | OUTPATIENT
Start: 2023-10-02 | End: 2023-10-02 | Stop reason: SURG

## 2023-10-02 RX ORDER — NALOXONE HCL 0.4 MG/ML
0.2 VIAL (ML) INJECTION AS NEEDED
Status: DISCONTINUED | OUTPATIENT
Start: 2023-10-02 | End: 2023-10-02 | Stop reason: HOSPADM

## 2023-10-02 RX ORDER — WOUND DRESSING ADHESIVE - LIQUID
LIQUID MISCELLANEOUS AS NEEDED
Status: DISCONTINUED | OUTPATIENT
Start: 2023-10-02 | End: 2023-10-02 | Stop reason: HOSPADM

## 2023-10-02 RX ORDER — VANCOMYCIN HYDROCHLORIDE 1 G/20ML
INJECTION, POWDER, LYOPHILIZED, FOR SOLUTION INTRAVENOUS AS NEEDED
Status: DISCONTINUED | OUTPATIENT
Start: 2023-10-02 | End: 2023-10-02 | Stop reason: HOSPADM

## 2023-10-02 RX ORDER — EPHEDRINE SULFATE 50 MG/ML
5 INJECTION, SOLUTION INTRAVENOUS ONCE AS NEEDED
Status: DISCONTINUED | OUTPATIENT
Start: 2023-10-02 | End: 2023-10-02 | Stop reason: HOSPADM

## 2023-10-02 RX ORDER — CELECOXIB 200 MG/1
200 CAPSULE ORAL ONCE
Status: COMPLETED | OUTPATIENT
Start: 2023-10-02 | End: 2023-10-02

## 2023-10-02 RX ORDER — FLUMAZENIL 0.1 MG/ML
0.2 INJECTION INTRAVENOUS AS NEEDED
Status: DISCONTINUED | OUTPATIENT
Start: 2023-10-02 | End: 2023-10-02 | Stop reason: HOSPADM

## 2023-10-02 RX ORDER — DIPHENHYDRAMINE HYDROCHLORIDE 50 MG/ML
12.5 INJECTION INTRAMUSCULAR; INTRAVENOUS
Status: DISCONTINUED | OUTPATIENT
Start: 2023-10-02 | End: 2023-10-02 | Stop reason: HOSPADM

## 2023-10-02 RX ORDER — NALOXONE HCL 0.4 MG/ML
0.1 VIAL (ML) INJECTION
Status: DISCONTINUED | OUTPATIENT
Start: 2023-10-02 | End: 2023-10-03 | Stop reason: HOSPADM

## 2023-10-02 RX ORDER — DEXTROSE MONOHYDRATE 25 G/50ML
25 INJECTION, SOLUTION INTRAVENOUS
Status: DISCONTINUED | OUTPATIENT
Start: 2023-10-02 | End: 2023-10-03 | Stop reason: HOSPADM

## 2023-10-02 RX ORDER — OXYCODONE HYDROCHLORIDE AND ACETAMINOPHEN 5; 325 MG/1; MG/1
1 TABLET ORAL EVERY 4 HOURS PRN
Status: DISCONTINUED | OUTPATIENT
Start: 2023-10-02 | End: 2023-10-03 | Stop reason: HOSPADM

## 2023-10-02 RX ORDER — TRANEXAMIC ACID 100 MG/ML
INJECTION, SOLUTION INTRAVENOUS AS NEEDED
Status: DISCONTINUED | OUTPATIENT
Start: 2023-10-02 | End: 2023-10-02 | Stop reason: SURG

## 2023-10-02 RX ORDER — ACETAMINOPHEN 500 MG
1000 TABLET ORAL ONCE
Status: COMPLETED | OUTPATIENT
Start: 2023-10-02 | End: 2023-10-02

## 2023-10-02 RX ORDER — SODIUM CHLORIDE, SODIUM LACTATE, POTASSIUM CHLORIDE, CALCIUM CHLORIDE 600; 310; 30; 20 MG/100ML; MG/100ML; MG/100ML; MG/100ML
9 INJECTION, SOLUTION INTRAVENOUS CONTINUOUS
Status: DISCONTINUED | OUTPATIENT
Start: 2023-10-02 | End: 2023-10-03 | Stop reason: HOSPADM

## 2023-10-02 RX ORDER — PROMETHAZINE HYDROCHLORIDE 25 MG/1
25 TABLET ORAL ONCE AS NEEDED
Status: DISCONTINUED | OUTPATIENT
Start: 2023-10-02 | End: 2023-10-02 | Stop reason: HOSPADM

## 2023-10-02 RX ORDER — PROMETHAZINE HYDROCHLORIDE 25 MG/1
25 SUPPOSITORY RECTAL ONCE AS NEEDED
Status: DISCONTINUED | OUTPATIENT
Start: 2023-10-02 | End: 2023-10-02 | Stop reason: HOSPADM

## 2023-10-02 RX ORDER — UREA 10 %
1 LOTION (ML) TOPICAL NIGHTLY PRN
Status: DISCONTINUED | OUTPATIENT
Start: 2023-10-02 | End: 2023-10-03 | Stop reason: HOSPADM

## 2023-10-02 RX ORDER — ROCURONIUM BROMIDE 10 MG/ML
INJECTION, SOLUTION INTRAVENOUS AS NEEDED
Status: DISCONTINUED | OUTPATIENT
Start: 2023-10-02 | End: 2023-10-02 | Stop reason: SURG

## 2023-10-02 RX ORDER — DEXAMETHASONE SODIUM PHOSPHATE 4 MG/ML
INJECTION, SOLUTION INTRA-ARTICULAR; INTRALESIONAL; INTRAMUSCULAR; INTRAVENOUS; SOFT TISSUE AS NEEDED
Status: DISCONTINUED | OUTPATIENT
Start: 2023-10-02 | End: 2023-10-02 | Stop reason: SURG

## 2023-10-02 RX ORDER — SODIUM CHLORIDE 9 MG/ML
100 INJECTION, SOLUTION INTRAVENOUS CONTINUOUS
Status: DISCONTINUED | OUTPATIENT
Start: 2023-10-02 | End: 2023-10-03

## 2023-10-02 RX ORDER — NICOTINE POLACRILEX 4 MG
15 LOZENGE BUCCAL
Status: DISCONTINUED | OUTPATIENT
Start: 2023-10-02 | End: 2023-10-03 | Stop reason: HOSPADM

## 2023-10-02 RX ORDER — DIPHENHYDRAMINE HYDROCHLORIDE 50 MG/ML
25 INJECTION INTRAMUSCULAR; INTRAVENOUS EVERY 6 HOURS PRN
Status: DISCONTINUED | OUTPATIENT
Start: 2023-10-02 | End: 2023-10-03 | Stop reason: HOSPADM

## 2023-10-02 RX ORDER — SODIUM CHLORIDE 0.9 % (FLUSH) 0.9 %
3-10 SYRINGE (ML) INJECTION AS NEEDED
Status: DISCONTINUED | OUTPATIENT
Start: 2023-10-02 | End: 2023-10-02 | Stop reason: HOSPADM

## 2023-10-02 RX ADMIN — CELECOXIB 200 MG: 200 CAPSULE ORAL at 09:59

## 2023-10-02 RX ADMIN — TRANEXAMIC ACID 1000 MG: 100 INJECTION INTRAVENOUS at 11:30

## 2023-10-02 RX ADMIN — PREGABALIN 150 MG: 75 CAPSULE ORAL at 09:59

## 2023-10-02 RX ADMIN — LIDOCAINE HYDROCHLORIDE 100 MG: 20 INJECTION, SOLUTION INFILTRATION; PERINEURAL at 11:22

## 2023-10-02 RX ADMIN — SODIUM CHLORIDE 100 ML/HR: 9 INJECTION, SOLUTION INTRAVENOUS at 17:13

## 2023-10-02 RX ADMIN — MIDAZOLAM 1 MG: 1 INJECTION INTRAMUSCULAR; INTRAVENOUS at 10:17

## 2023-10-02 RX ADMIN — OXYCODONE AND ACETAMINOPHEN 2 TABLET: 5; 325 TABLET ORAL at 13:39

## 2023-10-02 RX ADMIN — GLYCOPYRROLATE 0.2 MG: 0.2 INJECTION INTRAMUSCULAR; INTRAVENOUS at 11:22

## 2023-10-02 RX ADMIN — SODIUM CHLORIDE, POTASSIUM CHLORIDE, SODIUM LACTATE AND CALCIUM CHLORIDE 9 ML/HR: 600; 310; 30; 20 INJECTION, SOLUTION INTRAVENOUS at 10:07

## 2023-10-02 RX ADMIN — PROPOFOL 200 MG: 10 INJECTION, EMULSION INTRAVENOUS at 11:22

## 2023-10-02 RX ADMIN — SODIUM CHLORIDE, POTASSIUM CHLORIDE, SODIUM LACTATE AND CALCIUM CHLORIDE 9 ML/HR: 600; 310; 30; 20 INJECTION, SOLUTION INTRAVENOUS at 13:52

## 2023-10-02 RX ADMIN — METOPROLOL TARTRATE 25 MG: 25 TABLET, FILM COATED ORAL at 21:13

## 2023-10-02 RX ADMIN — ACETAMINOPHEN 1000 MG: 500 TABLET ORAL at 09:59

## 2023-10-02 RX ADMIN — CEFAZOLIN SODIUM 2 G: 2 INJECTION, SOLUTION INTRAVENOUS at 18:32

## 2023-10-02 RX ADMIN — SUGAMMADEX 200 MG: 100 INJECTION, SOLUTION INTRAVENOUS at 12:51

## 2023-10-02 RX ADMIN — FENTANYL CITRATE 25 MCG: 50 INJECTION, SOLUTION INTRAMUSCULAR; INTRAVENOUS at 11:58

## 2023-10-02 RX ADMIN — FENTANYL CITRATE 50 MCG: 50 INJECTION, SOLUTION INTRAMUSCULAR; INTRAVENOUS at 11:42

## 2023-10-02 RX ADMIN — ROCURONIUM BROMIDE 50 MG: 10 INJECTION, SOLUTION INTRAVENOUS at 11:22

## 2023-10-02 RX ADMIN — GABAPENTIN 600 MG: 300 CAPSULE ORAL at 21:13

## 2023-10-02 RX ADMIN — FAMOTIDINE 20 MG: 10 INJECTION INTRAVENOUS at 10:07

## 2023-10-02 RX ADMIN — HYDROMORPHONE HYDROCHLORIDE 0.5 MG: 1 INJECTION, SOLUTION INTRAMUSCULAR; INTRAVENOUS; SUBCUTANEOUS at 11:52

## 2023-10-02 RX ADMIN — ONDANSETRON 4 MG: 2 INJECTION INTRAMUSCULAR; INTRAVENOUS at 12:38

## 2023-10-02 RX ADMIN — DEXAMETHASONE SODIUM PHOSPHATE 8 MG: 4 INJECTION, SOLUTION INTRA-ARTICULAR; INTRALESIONAL; INTRAMUSCULAR; INTRAVENOUS; SOFT TISSUE at 11:26

## 2023-10-02 RX ADMIN — FENTANYL CITRATE 25 MCG: 50 INJECTION, SOLUTION INTRAMUSCULAR; INTRAVENOUS at 12:01

## 2023-10-02 RX ADMIN — FENTANYL CITRATE 50 MCG: 50 INJECTION, SOLUTION INTRAMUSCULAR; INTRAVENOUS at 13:28

## 2023-10-02 RX ADMIN — CEFAZOLIN SODIUM 2000 MG: 2 INJECTION, SOLUTION INTRAVENOUS at 11:12

## 2023-10-02 NOTE — PLAN OF CARE
Goal Outcome Evaluation:  Plan of Care Reviewed With: patient        Progress: no change  Outcome Evaluation: POD0 L total knee revision. VSS. NVI. pain controled with pain medications. BRP x1 assist. Kamaljit flashing green. runs norberto at baseline. plan to D/C home when medically stable.

## 2023-10-02 NOTE — ANESTHESIA PROCEDURE NOTES
Airway  Urgency: elective    Date/Time: 10/2/2023 11:25 AM  Airway not difficult    General Information and Staff    Patient location during procedure: OR  Anesthesiologist: Flash No MD  CRNA/CAA: Jo Ann Anderson CRNA    Indications and Patient Condition  Indications for airway management: airway protection    Preoxygenated: yes  Mask difficulty assessment: 1 - vent by mask    Final Airway Details  Final airway type: endotracheal airway      Successful airway: ETT  Cuffed: yes   Successful intubation technique: direct laryngoscopy  Facilitating devices/methods: intubating stylet  Endotracheal tube insertion site: oral  Blade: Anai  Blade size: 3  ETT size (mm): 7.0  Cormack-Lehane Classification: grade I - full view of glottis  Placement verified by: chest auscultation and capnometry   Measured from: lips  ETT/EBT  to lips (cm): 21  Number of attempts at approach: 1  Assessment: lips, teeth, and gum same as pre-op and atraumatic intubation

## 2023-10-02 NOTE — ANESTHESIA PREPROCEDURE EVALUATION
Anesthesia Evaluation     no history of anesthetic complications:   NPO Solid Status: > 8 hours  NPO Liquid Status: > 2 hours           Airway   Mallampati: I  Neck ROM: full  no difficulty expected  Dental - normal exam         Pulmonary - normal exam   (+) a smoker Current Smoked day of surgery, COPD, asthma,sleep apnea    PE comment: nonlabored  Cardiovascular - normal exam  Exercise tolerance: good (4-7 METS)    ECG reviewed  Rhythm: regular  Rate: normal    (+) hypertension, PVD, hyperlipidemia  (-) valvular problems/murmurs, past MI, CAD, dysrhythmias, angina      Neuro/Psych- negative ROS  (-) seizures, TIA, CVA    ROS Comment: Neuropathy B feet  GI/Hepatic/Renal/Endo    (+) obesity, GERD, diabetes mellitus  (-) liver disease, no renal disease, no thyroid disorder    Musculoskeletal (-) negative ROS    Abdominal    Substance History      OB/GYN          Other                        Anesthesia Plan    ASA 3     general     intravenous induction     Anesthetic plan, risks, benefits, and alternatives have been provided, discussed and informed consent has been obtained with: patient.      CODE STATUS:

## 2023-10-02 NOTE — DISCHARGE PLACEMENT REQUEST
"Yenny Berg (58 y.o. Female)       Date of Birth   1965    Social Security Number       Address   [address] [place] [address]    Home Phone   [phone]    MRN   [id number]       [facility]    Marital Status                               Admission Date   10/2/23    Admission Type   Elective    Admitting Provider   Luis Ansari II, MD    Attending Provider   Luis Ansari II, MD    Department, Room/Bed   [facility]SC OR, [facility] OR/[facility]C OR       Discharge Date       Discharge Disposition       Discharge Destination                                 Attending Provider: Luis Ansari II, MD    Allergies: Latex    Isolation: None   Infection: None   Code Status: Not on file    Ht: 172.1 cm (67.75\")   Wt: 93 kg (205 lb)    Admission Cmt: None   Principal Problem: None                  Active Insurance as of 10/2/2023       Primary Coverage       Payor Plan Insurance Group Employer/Plan Group    [facility] BY GOSIA [id number]       Payor Plan Address Payor Plan Phone Number Payor Plan Fax Number Effective Dates    [address] [address]   1/1/2021 - None Entered    [facility] [address]         Subscriber Name Subscriber Birth Date Member ID       YENNY BERG 1965 [id number]                     Emergency Contacts        (Rel.) Home Phone Work Phone Mobile Phone    CHUCKY CELAYA (Daughter) -- -- [phone]                "

## 2023-10-02 NOTE — OP NOTE
Total Knee Revision Operative Note  Dr. BEBETO Ansari II  [phone]    PATIENT NAME: Yenny Crawford  MRN: [id number]  : 1965 AGE: 58 y.o. GENDER: female  DATE OF OPERATION: 10/2/2023  PREOPERATIVE DIAGNOSIS: Loose Implants: During workup this patient was found to have loose total knee implants.   POSTOPERATIVE DIAGNOSIS: Same  OPERATION PERFORMED: Left Total Knee Arthroplasty Revision  SURGEON: Luis Ansari MD  Circulator: Mary Gill RN; Jeannette Wright RN  Scrub Person: Frankie Flynn; Jimena Gary PCT  Vendor Representative: Keaton West  Assistant: Huyen Loaiza PA  ANESTHESIA: General  ASSISTANT: KEKE Jensen. This case would not have been possible without another set of skilled surgical hands for retraction, use of instrumentation, and general assistance.  This assistance was vital to the success of the case.   ESTIMATED BLOOD LOSS: 100cc  SPONGE AND NEEDLE COUNT: Correct  INDICATIONS: Loose Implants: This patient was noted to have a painful and loose total knee implant that failed conservative treatment. The patient was indicated for an elective revision total knee. A discussion of operative versus nonoperative treatment was had with the patient and they failed conservative management. They elected to undergo total knee arthroplasty revision. The risks of surgery were discussed and included the risk of anesthesia, infection, damage to neurovascular structures, implant loosening/failure, fracture, hardware prominence, continued pain, early failure, the need for further procedures, medical complications, and others. No guarantees were made. The patient wished to proceed with surgery and a surgical consent was signed.    COMPONENTS:   Size 6 left [employer]in[employer] constrained femoral component with two 5 mm distal wedges and a 14 x 120 cemented stem  Size 3 left [facility] revision tibial baseplate with a 5 mm wedge and a 12 x 120 cemented stem  18 mm cone  15 mm polyethylene  insert,  PS    PERTINENT FINDINGS: Loose tibial component    DETAILS OF PROCEDURE:  The patient was met in the preoperative area. The site was marked. The consent and H&P were reviewed. The patient was then wheeled back to the operative suite and transferred to the operative table. The patient underwent anesthesia. A tourniquet was placed on the upper thigh.  A bump was placed under the operative hip. The Couch baseplate was secured to the table. Surgical alcohol was used to thoroughly clean the entire operative extremity.     The leg was then prepped in the normal sterile fashion, which included Chloroprep, multiple layers of sterile drapes, and surgical space suits for the entire operative team. The Couch boot was applied to the foot after adequate padding. An Ioban dressing was applied to the knee after the surgical incision was marked.  New outer gloves were used by all sterile surgical team members after final draping. After a surgical timeout in which administration of preoperative antibiotics as well as 1g of tranexamic acid (if not contraindicated) and the surgical site were confirmed, the tourniquet was put up.     In flexion, a midline knee incision was utilized through the old scar from the prior surgery.  Dissection was carried down to the capsule.  Medial and lateral flaps were created to allow adequate exposure.  Next a medial parapatellar arthrotomy was made.  A complete synovectomy was performed to gain better access to the knee.  Excess scar tissue about the patella was removed.  The patella was noted to be in good condition.  It was not obviously loose.  I then inserted 2 tibial and 2 femoral pins for the robot apparatus.  The knee was mapped and planned in standard fashion and afterwards, the polyethylene was removed and then using Johnson osteotomes and a saw all the femoral and tibial implants were removed without taking out too much bone at all.  The tibia component was noted to be grossly  loose during removal and came out rather easily.  I turned my attention to the robot and the femur was burred.  According to the robot there is no need for augments.  I then turned my attention to the tibia after making an initial solid resection using the robot placed guide, I cleaned up the cut using the robotic bur.  There were a lot of osteophytes left within the knee and these were removed from the back of the knee.  I then prepared for a stem on both sides and the knee was trialed at preparing for the PS box on the femur.  The knee was noted to be significantly looser in extension than it was in flexion and I did trial a femur with 5 mm augments which helped tremendously.  The knee was excellently balanced at that point.  I then prepared for a tibial cone to allow for added fixation in this young revision patient.  Canal restrictors were utilized in both the tibia and the femur.  Real implants were opened while cement was mixed and the knee was thoroughly irrigated.  The components were assembled.  The first batch of cement was used to cement the tibial component in place and a second batch of cement was mixed and the femoral component was cemented in place.  A trial polyethylene was utilized while cement hardened.  The tourniquet was taken down and adequate hemostasis was achieved.  The knee was injected with an analgesic cocktail.  The femoral and tibial pins were removed and those incisions were closed.  I then exposed the patella and there was a little bit of excess lateral bone and I did remove this using a saw but the patella component itself was well seated and fixated.  After all cement was hardened, the knee was trialed 1 final time and real polyethylene was opened and inserted.    The knee was then closed in layers and a sterile dressing was applied    The patient was awoken from anesthesia, moved to the [facility] and taken to the recovery room in stable condition. Sponge and needle count were correct.  "There were no complications. Patient tolerated the procedure well.    R \"Marino\" Elan GONZALEZ MD  Orthopaedic Surgery  [facility]  [phone]                "

## 2023-10-02 NOTE — H&P
Orthopaedic Surgery  History & Physical  Dr. BEBETO Ansari II  [phone]    HPI:  Patient is a 58 y.o. Not  or  female who presents with a failed left total knee and is here today for revision surgery    MEDICAL HISTORY  Past Medical History:   Diagnosis Date    Arthritis     Asthma     COPD (chronic obstructive pulmonary disease)     Degenerative disc disease, lumbar     CHRONIC    Diabetes mellitus     GERD (gastroesophageal reflux disease)     Hyperlipidemia     Hypertension     Neuropathy     CONOR FEET    Osteoarthritis     LEFT KNEE    Positive colorectal cancer screening using Cologuard test     PVD (peripheral vascular disease)     Seasonal allergies     Sleep apnea     NO CURRENT DEVICE     Past Surgical History:   Procedure Laterality Date    APPENDECTOMY      ECTOPIC PREGNANCY      JOINT REPLACEMENT Bilateral     KNEE ARTHROPLASTY    WISDOM TOOTH EXTRACTION       Prior to Admission medications    Medication Sig Start Date End Date Taking? Authorizing Provider   albuterol sulfate  (90 Base) MCG/ACT inhaler Inhale 2 puffs Every 4 (Four) Hours As Needed. 9/21/23  Yes Juancarlos Forde MD   CHLORHEXIDINE GLUCONATE CLOTH EX Apply  topically. USE AS DIRECTED   Yes Juancarlos Forde MD   Cholecalciferol 50 MCG (2000 UT) tablet Take 1 tablet by mouth Daily. HOLDING FOR DOS   Yes Juancarlos Forde MD   Cyanocobalamin 1000 MCG/ML kit Inject 1 mL into the appropriate muscle as directed by prescriber Every 30 (Thirty) Days. 9/21/23  Yes Juancarlos Forde MD   docusate sodium 100 MG capsule Take 1 capsule by mouth 2 (Two) Times a Day. 9/21/23  Yes Juancarlos Forde MD   gabapentin (NEURONTIN) 600 MG tablet Take 1 tablet by mouth 3 (Three) Times a Day. 8/19/23  Yes Juancarlos Forde MD   Jardiance 10 MG tablet tablet Take 1 tablet by mouth Daily. 9/21/23  Yes Juancarlos Forde MD   loratadine (CLARITIN) 10 MG tablet Take 1 tablet by mouth Daily. 9/21/23  Yes  Juancarlos Forde MD   metFORMIN (GLUCOPHAGE) 850 MG tablet Take 1 tablet by mouth 2 (Two) Times a Day With Meals. NOT CURRENTLY TAKING 9/21/23  Yes Juancarlos Forde MD   metoprolol tartrate (LOPRESSOR) 25 MG tablet Take 1 tablet by mouth 2 (Two) Times a Day. 9/21/23  Yes Juancarlos Forde MD   Omega-3 Fatty Acids (fish oil) 1200 MG capsule capsule Take 1 capsule by mouth Daily With Breakfast. HOLDING FOR DOS 8/21/23  Yes Juancarlos Forde MD   simvastatin (ZOCOR) 10 MG tablet Take 1 tablet by mouth Every Night. 9/21/23  Yes Juancarlos Forde MD   tiotropium bromide monohydrate (SPIRIVA RESPIMAT) 2.5 MCG/ACT aerosol solution inhaler Inhale 2 puffs Daily.   Yes Juancarlos Forde MD   acyclovir (ZOVIRAX) 400 MG tablet Take 1 tablet by mouth Daily As Needed. 8/23/23   Juancarlos Forde MD   nitroglycerin (NITROSTAT) 0.4 MG SL tablet Place 1 tablet under the tongue Every 5 (Five) Minutes As Needed. 9/21/23   Juancarlos Forde MD   TiZANidine (ZANAFLEX) 2 MG capsule Take 1 capsule by mouth 3 (Three) Times a Day.    Juancarlos Forde MD     Allergies   Allergen Reactions    Latex Rash       There is no immunization history on file for this patient.  Social History     Tobacco Use    Smoking status: Every Day     Packs/day: 0.50     Years: 45.00     Pack years: 22.50     Types: Cigarettes    Smokeless tobacco: Never   Substance Use Topics    Alcohol use: Yes     Comment: SOCIALLY      Social History     Substance and Sexual Activity   Drug Use Never       REVIEW OF SYSTEMS:  Head: negative for headache  Respiratory: negative for shortness of breath.   Cardiovascular: negative for chest pain.   Gastrointestinal: negative abdominal pain.   Neurological: negative for LOC  Psychiatric/Behavioral: negative for memory loss.   All other systems reviewed and are negative    VITALS: /94 (BP Location: Left arm, Patient Position: Sitting)   Pulse 59   Temp 98.2 °F (36.8 °C) (Oral)   Resp  "16   Wt 93 kg (205 lb 0.4 oz)   SpO2 97%   BMI 31.40 kg/m²  Body mass index is 31.4 kg/m².    PHYSICAL EXAM:   CONSTITUTIONAL: A&Ox3, No acute distress  LUNGS: Equal chest rise, no shortness of air  CARDIOVASCULAR: palpable peripheral pulses  SKIN: no skin lesions in the area examined  LYMPH: no lymphadenopathy in the area examined      RADIOLOGY REVIEW:   XR Chest PA & Lateral    Result Date: 9/29/2023  Negative chest.  This report was finalized on 9/29/2023 10:39 AM by Dr. Zan Bagley M.D.       LABS:   Results for the past 24 hours:   Recent Results (from the past 24 hour(s))   POC Glucose Once    Collection Time: 10/02/23  9:21 AM    Specimen: Blood   Result Value Ref Range    Glucose 118 70 - 130 mg/dL       IMPRESSION:  Patient is a 58 y.o. Not  or  female with a failed left total knee    PLAN:   Admited to: Luis Ansari II, MD  Disposition: Left total knee arthroplasty revision    R \"Marino\" Elan GONZALEZ MD  Orthopaedic Surgery  [facility]  [phone] - [place] Office  [phone] - [place] Office    "

## 2023-10-02 NOTE — SIGNIFICANT NOTE
10/02/23 1636   OTHER   Discipline physical therapist   Rehab Time/Intention   Session Not Performed other (see comments)  (PT attempted eval POD 0 - unable to arouse pt despite max efforts. PT will f/u tomorrow for POD 1 eval, RN aware. Did find a recliner chair and put in pt's room to sit up with nsg later as appropriate.)   Therapy Assessment/Plan (PT)   Criteria for Skilled Interventions Met (PT) yes;meets criteria   Recommendation   PT - Next Appointment 10/03/23

## 2023-10-02 NOTE — ANESTHESIA POSTPROCEDURE EVALUATION
Patient: Yenny Crawford    Procedure Summary       Date: 10/02/23 Room / Location:  [facility] /  [facility]    Anesthesia Start: 1117 Anesthesia Stop: 1317    Procedure: TOTAL KNEE ARTHROPLASTY REVISION WITH CORI ROBOT (Left: Knee) Diagnosis:     Surgeons: Luis Ansari II, MD Provider: Flash No MD    Anesthesia Type: general ASA Status: 3            Anesthesia Type: general    Vitals  Vitals Value Taken Time   /98 10/02/23 1430   Temp 36.7 °C (98.1 °F) 10/02/23 1423   Pulse 62 10/02/23 1433   Resp 15 10/02/23 1423   SpO2 93 % 10/02/23 1433   Vitals shown include unvalidated device data.        Post Anesthesia Care and Evaluation    Patient location during evaluation: bedside  Patient participation: complete - patient participated  Level of consciousness: awake and alert  Pain management: adequate    Airway patency: patent  Anesthetic complications: No anesthetic complications  PONV Status: controlled  Cardiovascular status: acceptable and hemodynamically stable  Respiratory status: acceptable, spontaneous ventilation and nonlabored ventilation  Hydration status: acceptable    Comments: /92   Pulse 60   Temp 36.7 °C (98.1 °F) (Oral)   Resp 15   Wt 93 kg (205 lb 0.4 oz)   SpO2 95%   BMI 31.40 kg/m²

## 2023-10-03 ENCOUNTER — DOCUMENTATION (OUTPATIENT)
Dept: HOME HEALTH SERVICES | Facility: HOME HEALTHCARE | Age: 58
End: 2023-10-03
Payer: COMMERCIAL

## 2023-10-03 ENCOUNTER — HOME HEALTH ADMISSION (OUTPATIENT)
Dept: HOME HEALTH SERVICES | Facility: HOME HEALTHCARE | Age: 58
End: 2023-10-03
Payer: COMMERCIAL

## 2023-10-03 ENCOUNTER — TRANSCRIBE ORDERS (OUTPATIENT)
Dept: HOME HEALTH SERVICES | Facility: HOME HEALTHCARE | Age: 58
End: 2023-10-03
Payer: COMMERCIAL

## 2023-10-03 VITALS
HEIGHT: 68 IN | HEART RATE: 67 BPM | TEMPERATURE: 98.2 F | SYSTOLIC BLOOD PRESSURE: 141 MMHG | DIASTOLIC BLOOD PRESSURE: 72 MMHG | RESPIRATION RATE: 16 BRPM | BODY MASS INDEX: 31.07 KG/M2 | OXYGEN SATURATION: 99 % | WEIGHT: 205.03 LBS

## 2023-10-03 DIAGNOSIS — Z96.652 S/P REVISION OF TOTAL KNEE, LEFT: Primary | ICD-10-CM

## 2023-10-03 PROBLEM — Z72.0 TOBACCO ABUSE: Status: ACTIVE | Noted: 2023-10-03

## 2023-10-03 PROBLEM — G62.9 PERIPHERAL NEUROPATHY: Status: ACTIVE | Noted: 2023-10-03

## 2023-10-03 PROBLEM — I10 HTN (HYPERTENSION): Status: ACTIVE | Noted: 2023-10-03

## 2023-10-03 PROBLEM — E11.65 TYPE 2 DIABETES MELLITUS WITH HYPERGLYCEMIA, WITHOUT LONG-TERM CURRENT USE OF INSULIN: Status: ACTIVE | Noted: 2023-10-03

## 2023-10-03 PROBLEM — M54.9 CHRONIC BACK PAIN: Status: ACTIVE | Noted: 2023-10-03

## 2023-10-03 PROBLEM — I25.10 CAD (CORONARY ARTERY DISEASE): Status: ACTIVE | Noted: 2023-10-03

## 2023-10-03 PROBLEM — G89.29 CHRONIC BACK PAIN: Status: ACTIVE | Noted: 2023-10-03

## 2023-10-03 PROBLEM — J44.9 COPD (CHRONIC OBSTRUCTIVE PULMONARY DISEASE): Status: ACTIVE | Noted: 2023-10-03

## 2023-10-03 LAB
ANION GAP SERPL CALCULATED.3IONS-SCNC: 7.3 MMOL/L (ref 5–15)
BUN SERPL-MCNC: 16 MG/DL (ref 6–20)
BUN/CREAT SERPL: 18.8 (ref 7–25)
CALCIUM SPEC-SCNC: 8.5 MG/DL (ref 8.6–10.5)
CHLORIDE SERPL-SCNC: 103 MMOL/L (ref 98–107)
CO2 SERPL-SCNC: 25.7 MMOL/L (ref 22–29)
CREAT SERPL-MCNC: 0.85 MG/DL (ref 0.57–1)
DEPRECATED RDW RBC AUTO: 42.2 FL (ref 37–54)
EGFRCR SERPLBLD CKD-EPI 2021: 79.5 ML/MIN/1.73
ERYTHROCYTE [DISTWIDTH] IN BLOOD BY AUTOMATED COUNT: 12.8 % (ref 12.3–15.4)
GLUCOSE BLDC GLUCOMTR-MCNC: 159 MG/DL (ref 70–130)
GLUCOSE BLDC GLUCOMTR-MCNC: 179 MG/DL (ref 70–130)
GLUCOSE BLDC GLUCOMTR-MCNC: 190 MG/DL (ref 70–130)
GLUCOSE SERPL-MCNC: 140 MG/DL (ref 65–99)
HCT VFR BLD AUTO: 34.3 % (ref 34–46.6)
HGB BLD-MCNC: 11.5 G/DL (ref 12–15.9)
MCH RBC QN AUTO: 30.3 PG (ref 26.6–33)
MCHC RBC AUTO-ENTMCNC: 33.5 G/DL (ref 31.5–35.7)
MCV RBC AUTO: 90.5 FL (ref 79–97)
PLATELET # BLD AUTO: 180 10*3/MM3 (ref 140–450)
PMV BLD AUTO: 9.3 FL (ref 6–12)
POTASSIUM SERPL-SCNC: 4.6 MMOL/L (ref 3.5–5.2)
RBC # BLD AUTO: 3.79 10*6/MM3 (ref 3.77–5.28)
SODIUM SERPL-SCNC: 136 MMOL/L (ref 136–145)
WBC NRBC COR # BLD: 12.43 10*3/MM3 (ref 3.4–10.8)

## 2023-10-03 PROCEDURE — 25010000002 CEFAZOLIN IN DEXTROSE 2-4 GM/100ML-% SOLUTION: Performed by: ORTHOPAEDIC SURGERY

## 2023-10-03 PROCEDURE — 82948 REAGENT STRIP/BLOOD GLUCOSE: CPT

## 2023-10-03 PROCEDURE — 63710000001 INSULIN LISPRO (HUMAN) PER 5 UNITS: Performed by: INTERNAL MEDICINE

## 2023-10-03 PROCEDURE — 25810000003 SODIUM CHLORIDE 0.9 % SOLUTION: Performed by: ORTHOPAEDIC SURGERY

## 2023-10-03 PROCEDURE — 97110 THERAPEUTIC EXERCISES: CPT

## 2023-10-03 PROCEDURE — 97161 PT EVAL LOW COMPLEX 20 MIN: CPT

## 2023-10-03 PROCEDURE — 25010000002 HYDROMORPHONE 1 MG/ML SOLUTION: Performed by: ORTHOPAEDIC SURGERY

## 2023-10-03 PROCEDURE — 80048 BASIC METABOLIC PNL TOTAL CA: CPT | Performed by: ORTHOPAEDIC SURGERY

## 2023-10-03 PROCEDURE — 85027 COMPLETE CBC AUTOMATED: CPT | Performed by: ORTHOPAEDIC SURGERY

## 2023-10-03 RX ORDER — ONDANSETRON 4 MG/1
4 TABLET, FILM COATED ORAL EVERY 6 HOURS PRN
Qty: 30 TABLET | Refills: 0 | Status: SHIPPED | OUTPATIENT
Start: 2023-10-03

## 2023-10-03 RX ORDER — BUDESONIDE AND FORMOTEROL FUMARATE DIHYDRATE 160; 4.5 UG/1; UG/1
2 AEROSOL RESPIRATORY (INHALATION)
Qty: 10.2 G | Refills: 12 | Status: SHIPPED | OUTPATIENT
Start: 2023-10-03

## 2023-10-03 RX ORDER — ATORVASTATIN CALCIUM 20 MG/1
10 TABLET, FILM COATED ORAL DAILY
Status: DISCONTINUED | OUTPATIENT
Start: 2023-10-03 | End: 2023-10-03 | Stop reason: HOSPADM

## 2023-10-03 RX ORDER — OXYCODONE HYDROCHLORIDE AND ACETAMINOPHEN 5; 325 MG/1; MG/1
1 TABLET ORAL EVERY 4 HOURS PRN
Qty: 50 TABLET | Refills: 0 | Status: SHIPPED | OUTPATIENT
Start: 2023-10-03

## 2023-10-03 RX ORDER — ALBUTEROL SULFATE 2.5 MG/3ML
2.5 SOLUTION RESPIRATORY (INHALATION) EVERY 6 HOURS PRN
Status: DISCONTINUED | OUTPATIENT
Start: 2023-10-03 | End: 2023-10-03 | Stop reason: HOSPADM

## 2023-10-03 RX ORDER — BUDESONIDE AND FORMOTEROL FUMARATE DIHYDRATE 160; 4.5 UG/1; UG/1
2 AEROSOL RESPIRATORY (INHALATION)
Status: DISCONTINUED | OUTPATIENT
Start: 2023-10-03 | End: 2023-10-03 | Stop reason: HOSPADM

## 2023-10-03 RX ORDER — ASPIRIN 81 MG/1
81 TABLET ORAL EVERY 12 HOURS
Qty: 60 TABLET | Refills: 0 | Status: SHIPPED | OUTPATIENT
Start: 2023-10-03 | End: 2023-11-02

## 2023-10-03 RX ADMIN — OXYCODONE AND ACETAMINOPHEN 2 TABLET: 5; 325 TABLET ORAL at 17:10

## 2023-10-03 RX ADMIN — ASPIRIN 81 MG: 81 TABLET, COATED ORAL at 09:10

## 2023-10-03 RX ADMIN — OXYCODONE AND ACETAMINOPHEN 2 TABLET: 5; 325 TABLET ORAL at 06:35

## 2023-10-03 RX ADMIN — INSULIN LISPRO 2 UNITS: 100 INJECTION, SOLUTION INTRAVENOUS; SUBCUTANEOUS at 11:48

## 2023-10-03 RX ADMIN — MELOXICAM 15 MG: 15 TABLET ORAL at 09:10

## 2023-10-03 RX ADMIN — TIZANIDINE 2 MG: 4 TABLET ORAL at 00:04

## 2023-10-03 RX ADMIN — GABAPENTIN 600 MG: 300 CAPSULE ORAL at 06:36

## 2023-10-03 RX ADMIN — TIZANIDINE 2 MG: 4 TABLET ORAL at 17:05

## 2023-10-03 RX ADMIN — HYDROMORPHONE HYDROCHLORIDE 1 MG: 1 INJECTION, SOLUTION INTRAMUSCULAR; INTRAVENOUS; SUBCUTANEOUS at 04:57

## 2023-10-03 RX ADMIN — ATORVASTATIN CALCIUM 10 MG: 20 TABLET, FILM COATED ORAL at 17:05

## 2023-10-03 RX ADMIN — TIZANIDINE 2 MG: 4 TABLET ORAL at 06:35

## 2023-10-03 RX ADMIN — TIZANIDINE 2 MG: 4 TABLET ORAL at 11:48

## 2023-10-03 RX ADMIN — CEFAZOLIN SODIUM 2 G: 2 INJECTION, SOLUTION INTRAVENOUS at 02:52

## 2023-10-03 RX ADMIN — FAMOTIDINE 40 MG: 20 TABLET, FILM COATED ORAL at 09:11

## 2023-10-03 RX ADMIN — INSULIN LISPRO 2 UNITS: 100 INJECTION, SOLUTION INTRAVENOUS; SUBCUTANEOUS at 17:05

## 2023-10-03 RX ADMIN — INSULIN LISPRO 2 UNITS: 100 INJECTION, SOLUTION INTRAVENOUS; SUBCUTANEOUS at 09:10

## 2023-10-03 RX ADMIN — SODIUM CHLORIDE 100 ML/HR: 9 INJECTION, SOLUTION INTRAVENOUS at 02:54

## 2023-10-03 RX ADMIN — OXYCODONE AND ACETAMINOPHEN 2 TABLET: 5; 325 TABLET ORAL at 00:11

## 2023-10-03 RX ADMIN — GABAPENTIN 600 MG: 300 CAPSULE ORAL at 13:41

## 2023-10-03 NOTE — PLAN OF CARE
Goal Outcome Evaluation:  Plan of Care Reviewed With: patient           Outcome Evaluation: Pt is a 59 yo female who presents s/p L knee revision. Prior to admission, pt was living at home and independent with all mobility. Upon exam, pt demonstrates post op L knee pain, impaired L knee ROM, L LE weakness, impaired gait mechanics, and decreased endurance limiting mobility. Pt was able to ambulate in hallway with rwx and CGA/SBA. Pt completed all L TKA exercises independently. Pt has rwx at home and plans to DC home today and continue with  PT.      Anticipated Discharge Disposition (PT): home with home health, home with assist

## 2023-10-03 NOTE — PLAN OF CARE
Goal Outcome Evaluation:  Plan of Care Reviewed With: patient        Progress: improving  Outcome Evaluation: POD1 L total knee. VSS. NVI. dressing CDI ROXIE flashing green. refused alarms. refused IV. ACE wrap D/C per orders. pt cleared for D/C today but wants to stay another day. plan to D/C when stable. Educated on ROXIE

## 2023-10-03 NOTE — PLAN OF CARE
Goal Outcome Evaluation:  Plan of Care Reviewed With: patient        Progress: improving  Outcome Evaluation: POD 1 Left total knee, beckie drain, VSS, IV fluids and antibiotics, A&O x4, slept most of shift, PRN painmeds given, no nausea, ambulating to BR, voiding freely, assit x1 walker, gait belt, will CTM

## 2023-10-03 NOTE — PROGRESS NOTES
Patient is discharging today . Spoke to patient who is agreeable to home health and has no current home health .Face sheet information is correct and will transcribe home health PT orders per Cristiana/ Dr Marino Ansari per ortho care plan- 2 week 1 , 3 week 1. Remove zip dressing / sutures POD 14. Then to outpatient  [facility] 10/16/23 at 3:30 pm. Thank you !

## 2023-10-03 NOTE — DISCHARGE SUMMARY
Orthopaedic Discharge Summary  Dr. TATE “Marino” Elan II  [phone]    NAME: Yennymichele Crawford PCP: Kassy Antonio APRN   :  MRN: [id number]  [id number] LOS:  ADMIT: 0 days  10/2/2023   AGE/SEX: 58 y.o. female DC:  today             Admitting Diagnosis: Status post knee replacement [Z96.659]    Surgery Performed: TOTAL KNEE ARTHROPLASTY REVISION WITH CORI ROBOT    Discharge Medications:         Discharge Medications        New Medications        Instructions Start Date   aspirin 81 MG EC tablet   81 mg, Oral, Every 12 Hours      ondansetron 4 MG tablet  Commonly known as: Zofran   4 mg, Oral, Every 6 Hours PRN      oxyCODONE-acetaminophen 5-325 MG per tablet  Commonly known as: PERCOCET   1 tablet, Oral, Every 4 Hours PRN             Continue These Medications        Instructions Start Date   acyclovir 400 MG tablet  Commonly known as: ZOVIRAX   400 mg, Oral, Daily PRN      albuterol sulfate  (90 Base) MCG/ACT inhaler  Commonly known as: PROVENTIL HFA;VENTOLIN HFA;PROAIR HFA   2 puffs, Inhalation, Every 4 Hours PRN      CHLORHEXIDINE GLUCONATE CLOTH EX   Apply externally, USE AS DIRECTED      Cholecalciferol 50 MCG (2000 UT) tablet   2,000 Units, Oral, Daily, HOLDING FOR DOS      Cyanocobalamin 1000 MCG/ML kit   1,000 mcg, Intramuscular, Every 30 Days      docusate sodium 100 MG capsule   100 mg, Oral, 2 Times Daily      fish oil 1200 MG capsule capsule   1,200 mg, Oral, Daily With Breakfast, HOLDING FOR DOS      gabapentin 600 MG tablet  Commonly known as: NEURONTIN   600 mg, Oral, 3 Times Daily      Jardiance 10 MG tablet tablet  Generic drug: empagliflozin   10 mg, Oral, Daily      loratadine 10 MG tablet  Commonly known as: CLARITIN   10 mg, Oral, Daily      metFORMIN 850 MG tablet  Commonly known as: GLUCOPHAGE   850 mg, Oral, 2 Times Daily With Meals, NOT CURRENTLY TAKING      metoprolol tartrate 25 MG tablet  Commonly known as: LOPRESSOR   25 mg, Oral, 2 Times Daily      nitroglycerin 0.4 MG  "SL tablet  Commonly known as: NITROSTAT   0.4 mg, Sublingual, Every 5 Minutes PRN      simvastatin 10 MG tablet  Commonly known as: ZOCOR   10 mg, Oral, Nightly      tiotropium bromide monohydrate 2.5 MCG/ACT aerosol solution inhaler  Commonly known as: SPIRIVA RESPIMAT   2 puffs, Inhalation, Daily - RT      TiZANidine 2 MG capsule  Commonly known as: ZANAFLEX   2 mg, Oral, 3 Times Daily               Vitals:     Vitals:    10/02/23 1454 10/02/23 1832 10/02/23 2200 10/03/23 0153   BP: 169/87 160/80 131/81 110/65   BP Location: Right arm Right arm Right arm Left arm   Patient Position: Lying Lying Lying Lying   Pulse: 61 68 83 59   Resp: 16 16 16 16   Temp: 99.3 °F (37.4 °C) 97.9 °F (36.6 °C) 98.7 °F (37.1 °C) 98.7 °F (37.1 °C)   TempSrc: Oral Oral Oral Oral   SpO2: 98% 96% 93% 92%   Weight: 93 kg (205 lb 0.4 oz)      Height: 172.1 cm (67.76\")          Labs:      Admission on 10/02/2023   Component Date Value Ref Range Status    Glucose 10/02/2023 118  70 - 130 mg/dL Final    Gram Stain 10/02/2023 Rare (1+) WBCs seen   Preliminary    Gram Stain 10/02/2023 No organisms seen   Preliminary    Gram Stain 10/02/2023 No WBCs or organisms seen   Preliminary    Glucose 10/02/2023 127  70 - 130 mg/dL Final    Glucose 10/02/2023 148 (H)  70 - 130 mg/dL Final    Glucose 10/03/2023 140 (H)  65 - 99 mg/dL Final    BUN 10/03/2023 16  6 - 20 mg/dL Final    Creatinine 10/03/2023 0.85  0.57 - 1.00 mg/dL Final    Sodium 10/03/2023 136  136 - 145 mmol/L Final    Potassium 10/03/2023 4.6  3.5 - 5.2 mmol/L Final    Chloride 10/03/2023 103  98 - 107 mmol/L Final    CO2 10/03/2023 25.7  22.0 - 29.0 mmol/L Final    Calcium 10/03/2023 8.5 (L)  8.6 - 10.5 mg/dL Final    BUN/Creatinine Ratio 10/03/2023 18.8  7.0 - 25.0 Final    Anion Gap 10/03/2023 7.3  5.0 - 15.0 mmol/L Final    eGFR 10/03/2023 79.5  >60.0 mL/min/1.73 Final    WBC 10/03/2023 12.43 (H)  3.40 - 10.80 10*3/mm3 Final    RBC 10/03/2023 3.79  3.77 - 5.28 10*6/mm3 Final    " "Hemoglobin 10/03/2023 11.5 (L)  12.0 - 15.9 g/dL Final    Hematocrit 10/03/2023 34.3  34.0 - 46.6 % Final    MCV 10/03/2023 90.5  79.0 - 97.0 fL Final    MCH 10/03/2023 30.3  26.6 - 33.0 pg Final    MCHC 10/03/2023 33.5  31.5 - 35.7 g/dL Final    RDW 10/03/2023 12.8  12.3 - 15.4 % Final    RDW-SD 10/03/2023 42.2  37.0 - 54.0 fl Final    MPV 10/03/2023 9.3  6.0 - 12.0 fL Final    Platelets 10/03/2023 180  140 - 450 10*3/mm3 Final    Glucose 10/02/2023 133 (H)  70 - 130 mg/dL Final        No results found.    Hospital Course:   58 y.o. female was admitted to [facility] to services of Luis Ansari II, MD with Status post knee replacement [Z96.659] on 10/2/2023 and underwent TOTAL KNEE ARTHROPLASTY REVISION WITH CORI ROBOT. Post-operatively the patient transferred to the floor where the patient underwent mobilization therapy. Opioids were titrated to achieve appropriate pain management to allow for participation in mobilization exercises. Vital signs and laboratory values are now within safe parameters for discharge. The dressings and/or incision is intact without signs or symptoms of active infection. Operative extremity neurovascular status remains intact as compared to the preoperative exam. Appropriate education re: incision care, activity levels, medications, and follow up visits was completed and all questions were answered. The patient is now deemed stable for discharge.    HOME: The patient progressed well with physical therapy. There were cleared for discharge to home. The patietn was sent home in good condition}.       R \"Marino\" Elan GONZALEZ MD  Orthopaedic Surgery  [facility]  [phone]                                               "

## 2023-10-03 NOTE — PROGRESS NOTES
Name: Yenny Crawford ADMIT: 10/2/2023   : 1965  PCP: Kassy Antonio APRN    MRN: [id number] LOS: 0 days   AGE/SEX: 58 y.o. female  ROOM: [facility]     Subjective   Subjective   Sitting up in bed. Reports 10/10 pain in knee. Ambulated well with PT. She denies any chest pain, dyspnea, cough, fever, chills. No nausea or vomiting. She hasn't had a BM yet but denies any abdominal pain. She states she is a little nervous about going home today.     Objective   Objective   Vital Signs  Temp:  [97.5 °F (36.4 °C)-99.3 °F (37.4 °C)] 97.5 °F (36.4 °C)  Heart Rate:  [55-83] 74  Resp:  [14-20] 16  BP: ()/(52-99) 124/67  SpO2:  [92 %-100 %] 98 %  on  Flow (L/min):  [2] 2;   Device (Oxygen Therapy): room air  Body mass index is 31.4 kg/m².    Physical Exam  Vitals and nursing note reviewed.   Constitutional:       Appearance: She is not toxic-appearing.   Cardiovascular:      Rate and Rhythm: Normal rate and regular rhythm.      Pulses: Normal pulses.   Pulmonary:      Effort: Pulmonary effort is normal. No respiratory distress.      Breath sounds: Rhonchi (scattered posteriorly but clears with coughing) present.   Abdominal:      General: Bowel sounds are normal. There is no distension.      Palpations: Abdomen is soft.      Tenderness: There is no abdominal tenderness.   Musculoskeletal:         General: Tenderness (left knee) present.      Cervical back: Normal range of motion and neck supple.   Skin:     General: Skin is warm and dry.      Findings: No bruising.   Neurological:      General: No focal deficit present.      Mental Status: She is alert and oriented to person, place, and time.      Sensory: No sensory deficit.      Motor: Weakness present.      Coordination: Coordination normal.   Psychiatric:         Mood and Affect: Mood normal.         Behavior: Behavior normal.       Results Review:       I reviewed the patient's new clinical results.  Results from last 7 days   Lab Units 10/03/23  8518  09/29/23  0902   WBC 10*3/mm3 12.43* 8.31   HEMOGLOBIN g/dL 11.5* 13.2   PLATELETS 10*3/mm3 180 207     Results from last 7 days   Lab Units 10/03/23  0443 09/29/23  0902   SODIUM mmol/L 136 139   POTASSIUM mmol/L 4.6 4.3   CHLORIDE mmol/L 103 105   CO2 mmol/L 25.7 24.0   BUN mg/dL 16 12   CREATININE mg/dL 0.85 0.64   GLUCOSE mg/dL 140* 115*   Estimated Creatinine Clearance: 85.6 mL/min (by C-G formula based on SCr of 0.85 mg/dL).  Results from last 7 days   Lab Units 09/29/23  0902   ALBUMIN g/dL 4.1   BILIRUBIN mg/dL 0.3   ALK PHOS U/L 88   AST (SGOT) U/L 15   ALT (SGPT) U/L 12     Results from last 7 days   Lab Units 10/03/23  0443 09/29/23  0902   CALCIUM mg/dL 8.5* 9.3   ALBUMIN g/dL  --  4.1       Glucose   Date/Time Value Ref Range Status   10/03/2023 1106 190 (H) 70 - 130 mg/dL Final   10/03/2023 0659 159 (H) 70 - 130 mg/dL Final   10/02/2023 2317 133 (H) 70 - 130 mg/dL Final   10/02/2023 1630 148 (H) 70 - 130 mg/dL Final   10/02/2023 1317 127 70 - 130 mg/dL Final   10/02/2023 0921 118 70 - 130 mg/dL Final       aspirin, 81 mg, Oral, Q12H  famotidine, 40 mg, Oral, Daily  gabapentin, 600 mg, Oral, Q8H  insulin lispro, 2-7 Units, Subcutaneous, 4x Daily AC & at Bedtime  meloxicam, 15 mg, Oral, Daily  metoprolol tartrate, 25 mg, Oral, BID  tiZANidine, 2 mg, Oral, Q6H      lactated ringers, 9 mL/hr, Last Rate: Stopped (10/02/23 1708)  sodium chloride, 100 mL/hr, Last Rate: 100 mL/hr (10/03/23 0725)    Diet: Regular/House Diet; Texture: Regular Texture (IDDSI 7); Fluid Consistency: Thin (IDDSI 0)       Assessment/Plan     Active Hospital Problems    Diagnosis  POA    **Status post knee replacement [Z96.659]  Not Applicable    COPD (chronic obstructive pulmonary disease) [J44.9]  Yes    CAD (coronary artery disease) [I25.10]  Yes    Tobacco abuse [Z72.0]  Yes    HTN (hypertension) [I10]  Yes    Type 2 diabetes mellitus with hyperglycemia, without long-term current use of insulin [E11.65]  Yes    Peripheral  neuropathy [G62.9]  Yes    Chronic back pain [M54.9, G89.29]  Yes      Resolved Hospital Problems   No resolved problems to display.       Ms. Crawford is a 58 y.o. female who presented to the hospital under the direction of orthopedic surgery for left total knee arthroplasty revision.  [facility] was consulted for postoperative medical management of her diabetes.    DM 2  -Last A1c 5.8% showing good outpatient control.  -Jardiance and metformin on hold.  Continue correctional sliding scale insulin while admitted.  -Can resume oral agents at discharge.  -Glucose trends appear stable.    COPD  Tobacco abuse  -Respiratory status appears stable.  -Does have some rhonchi that clears on exam.  We will add back her long-acting inhalers.  -Smoking cessation advised.    CAD  Hypertension  -Denies any cardiac complaints.  -Beta-blocker continued.  Resume statin.  -Follow-up with Dr. Kammerling in the outpatient setting.    Chronic back pain  Peripheral neuropathy  -Continue pain medications.  -Zanaflex and gabapentin resumed from home.    Status post knee replacement  -Per attending, orthopedic surgery.    Discussed with patient.     Suspect WBC elevation reactive from surgery. Repeat labs in AM if still admitted. Otherwise, she appears medically stable for discharge. Recommend close outpatient follow up with PCP in 1-2 weeks for chronic medical issues.      LAZARO Mancilla  [facility]  10/03/23  12:16 EDT

## 2023-10-03 NOTE — CASE MANAGEMENT/SOCIAL WORK
Discharge Planning Assessment  [facility]     Patient Name: Yenny Crawford  MRN: [id number]  Today's Date: 10/3/2023    Admit Date: 10/2/2023    Plan: Home with family support & [facility] .   Discharge Needs Assessment       Row Name 10/03/23 1322       Living Environment    People in Home child(leanne), adult    Name(s) of People in Home Son/Max.    Current Living Arrangements home    Primary Care Provided by self    Provides Primary Care For no one    Family Caregiver if Needed child(leanne), adult    Quality of Family Relationships helpful    Able to Return to Prior Arrangements yes       Resource/Environmental Concerns    Transportation Concerns none       Transition Planning    Patient/Family Anticipates Transition to home with family;home with help/services    Patient/Family Anticipated Services at Transition     Transportation Anticipated family or friend will provide       Discharge Needs Assessment    Readmission Within the Last 30 Days no previous admission in last 30 days    Equipment Currently Used at Home cane, straight    Discharge Facility/Level of Care Needs home with home health    Provided Post Acute Provider List? N/A    N/A Provider List Comment Declined.    Patient's Choice of Community Agency(s) [facility] .                   Discharge Plan       Row Name 10/03/23 1323       Plan    Plan Home with family support & [facility] HH.    Patient/Family in Agreement with Plan yes    Plan Comments Spoke with the patient, verified current information and explained the role of the CCP. Patient said she lives with her son/Max and has family support. She's IADL and has a cane that she uses as needed. She has no history with RH/HH. Patient plans to d/c home with family support & HH. Careplan received from [facility] which plans for [facility]. Discussed with the patient who's agreeable. CMS HH List was offered and declined. Referral previously sent/accepted by [facility] in [facility]. Steff plans for her family to  transport her home at d/c. No other needs identified. CCP will follow.                  Continued Care and Services - Admitted Since 10/2/2023       Home Medical Care Coordination complete.      Service Provider Request Status Selected Services Address Phone Fax Patient Preferred    [facility]  Selected Home Health Services [address] [address] [phone] --                  Expected Discharge Date and Time       Expected Discharge Date Expected Discharge Time    Oct 3, 2023            Demographic Summary       Row Name 10/03/23 1322       General Information    Admission Type inpatient    Reason for Consult discharge planning    Preferred Language English       Contact Information    Permission Granted to Share Info With ;family/designee                   Functional Status       Row Name 10/03/23 1322       Functional Status    Usual Activity Tolerance good       Functional Status, IADL    Medications independent    Meal Preparation assistive equipment    Housekeeping assistive equipment    Laundry assistive equipment    Shopping assistive equipment       Mental Status Summary    Recent Changes in Mental Status/Cognitive Functioning no changes                   Psychosocial       Row Name 10/03/23 1322       Intellectual Performance WDL    Level of Consciousness Alert       Coping/Stress    Patient Personal Strengths able to adapt    Sources of Support adult child(leanne)    Reaction to Health Status accepting    Understanding of Condition and Treatment adequate understanding of medical condition;adequate understanding of treatment       Developmental Stage (Eriksson's)    Developmental Stage Stage 7 (35-65 years/Middle Adulthood) Generativity vs. Stagnation                    Alyssa MARTEL RN

## 2023-10-03 NOTE — DISCHARGE INSTRUCTIONS
Total Knee  Discharge Instructions  Dr. BEBETO Llanos” Elan II  [phone]    INCISION CARE  Wash your hands prior to dressing changes  ROXIE Wound VAC: Postoperatively you had a ROXIE Wound Vac placed on the incision. This was placed under sterile conditions in the operating room. It remains in place for 7 days postoperatively. After 7 days, the entire dressing must be removed, including all of the sticky adhesive. The dressing and battery pack provide gentle suction to the incision and provide several benefits over a traditional dressing:  It maintains the sterile environment of the OR and reduces the risk of infection  The suction removes unwanted buildup of blood/hematoma under the skin to reduce swelling  The suction also promotes fresh blood supply to the skin and soft tissue to speed up healing  The postoperative scar is reduced in size  Showering is permitted immediately after surgery, but the battery pack must be protected or removed during the shower.   After 7 days the ROXIE Wound Vac is removed. If there is no drainage, no dressing is required. If there is some scant drainage a dry bandage can be applied and changed daily until seen in the office or until the drainage stops.   No creams or ointments to the incisions until 4 weeks post op.  Do not touch or pick at the incision  Check incision every day and notify surgeon immediately if any of the following signs or symptoms are seen:  Increase in redness  Increase in swelling around the incision and of the entire extremity  Increase in pain  NEW drainage or oozing from the incision  Pulling apart of the edges of the incision  Increase in overall body temperature (greater than 100.4 degrees)  Zip-Line: your incision was closed with a state of the art device.   Is a non-invasive and easy to use wound closure device that replaces sutures, staples and glue for surgical incisions  It minimizes scarring and eliminating “railroad” marks that come with staples or  sutures  It makes removal as atraumatic as peeling off a bandage  Can be removed at home or by a physical therapist or nurse at 14 days postoperatively    ACTIVITIES  Exercises:  Physical therapy will begin immediately while in the hospital. Patients going to a nursing home will get therapy as part of their care at the SNU/SNF facility. Patients going home may also have a therapist come to the house to help them mobilize until they can safely get to an outpatient therapy facility.  Elevate the affected leg most of the day during the first week post operatively. Caution must be taken to avoid pillow placement directly under the heel of the leg, as this can cause pressure ulcers even with a soft pillow. All pillows and blankets should be placed underneath of the thigh and calf so that the heel is free-floating.  Use cold packs for 20-30 minutes approximately 5 times per day.  You should perform the daily stretching and strengthening exercises as taught by the therapist as often as possible. This can be done many times a day.  Full weight bearing is allowed after surgery. It will be sore/painful to put weight on the leg, but this will help the bone to heal and prevent complications such as pneumonia, bed sores and blood clots. Mobilization is vital to the recovery process.  Activities of Daily Living:  No tub baths, hot tubs, or swimming pools for 4 weeks.  May shower and let water run over the incision immediately after surgery. The battery pack of the ROXIE Wound Vac must be protected or removed while in the shower. After the ROXIE is removed 7 days after surgery showering is permitted as long as there is no drainage from the wound.     Restrictions  Weight: It is ok to allow full weight bearing after surgery. Weight on the leg actually quickens the recovery process. While it will be sore/painful to put weight on the leg, it is safe to do so. Hip replacement after hip fracture has a much slower recover process. It can  take months to heal fully from a hip fracture and patients even make some slow benefits up to a year afterwards.   Driving: Many patients have questions about when it is safe to return to driving. The answer is that this is extremely variable. It depends on the extent of the surgery, as well as how quickly you heal. Certainly left leg surgeries make returning to driving easier while right leg surgeries require more extensive rehabilitation before driving can be safe. Until you can press down on the brake hard, and are off of all narcotics, driving is not permitted. Your surgeon cannot “clear” you to return to driving, only you can make the decision when you feel it is safe.    Medications  Anticoagulants: After upper extremity surgery most patients do not require an anticoagulant unless you have another injury that will be keeping you from mobilizing. Lower extremity surgery typically does require use of an anticoagulation medicine.   IF YOU HAD LOWER EXTREMITY SURGERY AND ARE NOT DISCHARGED HOME WITH ANY ANTICOAGULANT MEDICINE YOU SHOULD TAKE ASPIRIN 325mg DAILY FOR 30 DAYS POSTOPERATIVELY.  If you are discharged home with an anticoagulant such as Aspirin, Xarelto, Eliquis, Coumadin, or Lovenox, follow these simple instructions:   Notify surgeon immediately if any kim bleeding is noted in the urine, stool, emesis, or from the nose or the incision. Blood in the stool will often appear as black rather than red. Blood in urine may appear as pink. Blood in emesis may appear as brown/black like coffee grounds.  You will need to apply pressure for longer periods of time to any cuts or abrasions to stop bleeding  Avoid alcohol while taking anticoagulants  Most anticoagulants are to be taken for 30 days postoperatively. After this time, you may stop using them unless instructed otherwise.   If you were already taking an anticoagulant (commonly Aspirin, Coumadin, or Plavix) you will likely be resuming your normal dose  postoperatively and will be continuing that medication at the discretion of the prescribing physician.  Stool Softeners: You will be at greater risk of constipation after surgery due to being less mobile and the pain medications.  Take stool softeners as needed. Over the counter Colace 100 mg 1-2 capsules twice daily can be taken.  If stools become too loose or too frequent, please decreases the dosage or stop the stool softener.  If constipation occurs despite use of stool softeners, you are to continue the stool softeners and add a laxative (Milk of Magnesia 1 ounce daily as needed)  Drink plenty of fluids, and eat fruits and vegetables during your recovery time. Getting up and mobilizing will help the bowels to recover their regular function, as will weaning off of all narcotics when the pain becomes tolerable.  Pain Medications: Utilized after surgery are narcotics. This is some general information about these medications.  CLASSIFICATION: Pain medications are called Opioids and are narcotics  LEGALITIES: It is illegal to share narcotics with others  DRIVING: it is illegal to drive while under the influence of narcotics. Doing so is a DUI.  POTENTIAL SIDE EFFECTS: nausea, vomiting, itching, dizziness, drowsiness, dry mouth, constipation, and difficulty urinating.  POTENTIAL ADVERSE EFFECTS:  Opioid tolerance can develop with use of pain medications and this simply means that it requires more and more of the medication to control pain. However, this is seen more in patients that use opioids for longer periods of time.  Opioid dependence can develop with use of Opioids. People with opioid dependence will experience withdrawal symptoms upon cessation of the medication.  Opioid addiction can develop with use of Opioids. The incidence of this is very unlikely in patients who take the medications as ordered and stop the medications as instructed.  Opioid overdose can be dangerous, but is unlikely when the medication  is taken as ordered and stopped when ordered. It is important not to mix opioids with alcohol as this can lead to over sedation and respiratory difficulty.  DOSAGE:  After the initial surgical pain begins to resolve, you may begin to decrease the pain medication. By the end of a few weeks, you should be off of pain medications.  Refills will not be given by the office during evening hours, on weekends, or after 6 weeks post-op. You are responsible for weaning off of pain medication. You can increase the time between narcotic pills, taking one every 4 then 6 then 8 hours and so on.  To seek refills on pain medications during the initial 6-week post-operative period, you must call the office to request the refill. The office will then notify you when to  the prescription. DO NOT wait until you are out of the medication to request a refill. Prescriptions will not be filled over the weekend and depending on the schedule, it may take a couple days for the prescription to be available. Someone will have to pick the prescription in person at the office.    FOLLOW-UP VISITS  You will need to follow up in the office with your surgeon in 3 weeks, or as instructed elsewhere in your discharge paperwork. Please call this number [phone] to schedule this appointment. If you are going to an SNF/SNU facility, they will arrange for you to follow up in the office.  If you have any concerns or suspected complications prior to your follow up visit, please call the office. Do not wait until your appointment time if you suspect complications. These will need to be addressed in the office promptly.      Luis Ansari II, MD  Orthopaedic Surgery  [facility]

## 2023-10-03 NOTE — CONSULTS
CONSULT NOTE    INTERNAL MEDICINE   [facility]       Patient Identification:  Name: Yenny Crawford  Age: 58 y.o.  Sex: female  :  1965  MRN: [id number]             Date of Consultation:  10/02/23          Primary Care Physician: Kassy Antonio APRN                               Requesting Physician: dr ayala  Reason for Consultation:medical management    Chief Complaint:  58 year old female who was admitted after a knee replacement; we are asked to see her regarding medical management; she is sleeping but arouses; no family is present    History of Present Illness:   As above      Past Medical History:  Past Medical History:   Diagnosis Date    Arthritis     Asthma     COPD (chronic obstructive pulmonary disease)     Degenerative disc disease, lumbar     CHRONIC    Diabetes mellitus     GERD (gastroesophageal reflux disease)     Hyperlipidemia     Hypertension     Neuropathy     CONOR FEET    Osteoarthritis     LEFT KNEE    Positive colorectal cancer screening using Cologuard test     PVD (peripheral vascular disease)     Seasonal allergies     Sleep apnea     NO CURRENT DEVICE     Past Surgical History:  Past Surgical History:   Procedure Laterality Date    APPENDECTOMY      ECTOPIC PREGNANCY      JOINT REPLACEMENT Bilateral     KNEE ARTHROPLASTY    WISDOM TOOTH EXTRACTION        Home Meds:  Medications Prior to Admission   Medication Sig Dispense Refill Last Dose    albuterol sulfate  (90 Base) MCG/ACT inhaler Inhale 2 puffs Every 4 (Four) Hours As Needed.   10/2/2023    CHLORHEXIDINE GLUCONATE CLOTH EX Apply  topically. USE AS DIRECTED   10/2/2023    Cholecalciferol 50 MCG (2000 UT) tablet Take 1 tablet by mouth Daily. HOLDING FOR DOS   Past Month    Cyanocobalamin 1000 MCG/ML kit Inject 1 mL into the appropriate muscle as directed by prescriber Every 30 (Thirty) Days.   Past Month    docusate sodium 100 MG capsule Take 1 capsule by mouth 2 (Two) Times a Day.   10/1/2023     gabapentin (NEURONTIN) 600 MG tablet Take 1 tablet by mouth 3 (Three) Times a Day.   10/2/2023    Jardiance 10 MG tablet tablet Take 1 tablet by mouth Daily.   10/1/2023    loratadine (CLARITIN) 10 MG tablet Take 1 tablet by mouth Daily.   10/1/2023    metFORMIN (GLUCOPHAGE) 850 MG tablet Take 1 tablet by mouth 2 (Two) Times a Day With Meals. NOT CURRENTLY TAKING   Past Month    metoprolol tartrate (LOPRESSOR) 25 MG tablet Take 1 tablet by mouth 2 (Two) Times a Day.   10/2/2023    Omega-3 Fatty Acids (fish oil) 1200 MG capsule capsule Take 1 capsule by mouth Daily With Breakfast. HOLDING FOR DOS   10/1/2023    simvastatin (ZOCOR) 10 MG tablet Take 1 tablet by mouth Every Night.   10/1/2023    tiotropium bromide monohydrate (SPIRIVA RESPIMAT) 2.5 MCG/ACT aerosol solution inhaler Inhale 2 puffs Daily.   Past Week    acyclovir (ZOVIRAX) 400 MG tablet Take 1 tablet by mouth Daily As Needed.   9/30/2023    nitroglycerin (NITROSTAT) 0.4 MG SL tablet Place 1 tablet under the tongue Every 5 (Five) Minutes As Needed.       TiZANidine (ZANAFLEX) 2 MG capsule Take 1 capsule by mouth 3 (Three) Times a Day.   9/30/2023     Current Meds:     Current Facility-Administered Medications:     [START ON 10/3/2023] aspirin EC tablet 81 mg, 81 mg, Oral, Q12H, Luis Ansari II, MD    ceFAZolin in dextrose (ANCEF) IVPB solution 2 g, 2 g, Intravenous, Q8H, Luis Ansari II, MD, Stopped at 10/02/23 2113    dextrose (D50W) (25 g/50 mL) IV injection 25 g, 25 g, Intravenous, Q15 Min PRN, Erica Monsivais MD    dextrose (GLUTOSE) oral gel 15 g, 15 g, Oral, Q15 Min PRN, Erica Monsivais MD    diphenhydrAMINE (BENADRYL) capsule 50 mg, 50 mg, Oral, Q6H PRN **OR** diphenhydrAMINE (BENADRYL) injection 25 mg, 25 mg, Intravenous, Q6H PRN, Luis Ansari II, MD    docusate sodium (COLACE) capsule 100 mg, 100 mg, Oral, BID PRN, Luis Ansari II, MD    famotidine (PEPCID) tablet 40 mg, 40 mg, Oral,  Daily, Luis Ansari II, MD    gabapentin (NEURONTIN) capsule 600 mg, 600 mg, Oral, Q8H, Luis Ansari II, MD, 600 mg at 10/02/23 2113    glucagon (GLUCAGEN) injection 1 mg, 1 mg, Intramuscular, Q15 Min PRN, Erica Monsivais MD    HYDROmorphone (DILAUDID) injection 1 mg, 1 mg, Intramuscular, Q4H PRN **AND** naloxone (NARCAN) injection 0.1 mg, 0.1 mg, Intravenous, Q5 Min PRN, Luis Anasri II, MD    insulin lispro (HUMALOG/ADMELOG) injection 2-7 Units, 2-7 Units, Subcutaneous, 4x Daily AC & at Bedtime, Erica Monsivais MD    lactated ringers infusion, 9 mL/hr, Intravenous, Continuous, Flash No MD, Stopped at 10/02/23 1708    melatonin tablet 1 mg, 1 mg, Oral, Nightly PRN, Luis Ansari II, MD    [START ON 10/3/2023] meloxicam (MOBIC) tablet 15 mg, 15 mg, Oral, Daily, Luis Ansari II, MD    metoprolol tartrate (LOPRESSOR) tablet 25 mg, 25 mg, Oral, BID, Luis Ansari II, MD, 25 mg at 10/02/23 2113    ondansetron (ZOFRAN) tablet 4 mg, 4 mg, Oral, Q6H PRN **OR** ondansetron (ZOFRAN) injection 4 mg, 4 mg, Intravenous, Q6H PRN, Luis Ansari II, MD    oxyCODONE-acetaminophen (PERCOCET) 5-325 MG per tablet 1 tablet, 1 tablet, Oral, Q4H PRN, Luis Ansari II, MD    oxyCODONE-acetaminophen (PERCOCET) 5-325 MG per tablet 2 tablet, 2 tablet, Oral, Q4H PRN, Luis Ansari II, MD, 2 tablet at 10/02/23 1339    sodium chloride 0.9 % infusion, 100 mL/hr, Intravenous, Continuous, Luis Ansari II, MD, Last Rate: 100 mL/hr at 10/02/23 2113, 100 mL/hr at 10/02/23 2113    tiZANidine (ZANAFLEX) tablet 2 mg, 2 mg, Oral, Q6H, Luis Ansari II, MD  Allergies:  Allergies   Allergen Reactions    Latex Rash     Social History:   Social History     Socioeconomic History    Marital status:    Tobacco Use    Smoking status: Every Day     Packs/day: 0.50     Years: 45.00     Pack years: 22.50  "    Types: Cigarettes    Smokeless tobacco: Never   Vaping Use    Vaping Use: Former   Substance and Sexual Activity    Alcohol use: Yes     Comment: SOCIALLY    Drug use: Never    Sexual activity: Defer     Family History:  Family History   Problem Relation Age of Onset    Malig Hyperthermia Neg Hx           Review of Systems  See history of present illness and past medical history.  Patient denies headache, dizziness, syncope, falls, trauma, change in vision, change in hearing, change in taste, changes in weight, changes in appetite, focal weakness, numbness, or paresthesia.  Patient denies chest pain, palpitations, dyspnea, orthopnea, PND, cough, sinus pressure, rhinorrhea, epistaxis, hemoptysis, nausea, vomiting,hematemesis, diarrhea, constipation or hematochezia. Denies cold or heat intolerance, polydipsia, polyuria, polyphagia. Denies hematuria, pyuria, dysuria, hesitancy, frequency or urgency. Denies consumption of raw and under cooked meats foods or change in water source.  Denies fever, chills, sweats, night sweats.  Denies missing any routine medications. Remainder of ROS is negative.      Vitals:   /80 (BP Location: Right arm, Patient Position: Lying)   Pulse 68   Temp 97.9 °F (36.6 °C) (Oral)   Resp 16   Ht 172.1 cm (67.76\")   Wt 93 kg (205 lb 0.4 oz)   SpO2 96%   BMI 31.40 kg/m²   I/O:   Intake/Output Summary (Last 24 hours) at 10/2/2023 2315  Last data filed at 10/2/2023 1537  Gross per 24 hour   Intake 750 ml   Output 350 ml   Net 400 ml     Exam:  General Appearance:    Alert, cooperative, no distress, appears stated age   Head:    Normocephalic, without obvious abnormality, atraumatic   Eyes:    PERRL, conjunctivae/corneas clear, EOM's intact, both eyes   Ears:    Normal external ear canals, both ears   Nose:   Nares normal, septum midline, mucosa normal, no drainage    or sinus tenderness   Throat:   Lips, tongue, gums normal; oral mucosa pink and moist   Neck:   Supple, symmetrical, " trachea midline, no adenopathy;     thyroid:  no enlargement/tenderness/nodules; no carotid    bruit or JVD   Back:     Symmetric, no curvature, ROM normal, no CVA tenderness   Lungs:     Decreased breath sounds bilaterally, respirations unlabored   Chest Wall:    No tenderness or deformity    Heart:    Regular rate and rhythm, S1 and S2 normal, no murmur, rub   or gallop   Abdomen:     Soft, nontender, bowel sounds active all four quadrants,     no masses, no hepatomegaly, no splenomegaly   Extremities:   Extremities normal, atraumatic, no cyanosis or edema                 Data Review:  Labs in chart were reviewed.  No results found for: WBC, HGB, HCT, PLT  No results found for: NA, K, CL, CO2, BUN, CREATININE, GLUCOSE  No results found for: CALCIUM, MG, PHOS  No results found for: AST, ALT, ALKPHOS      Results from last 7 days   Lab Units 09/29/23  0902   HEMOGLOBIN A1C % 5.80*       Imaging Results (Last 7 Days)       Procedure Component Value Units Date/Time    XR Knee 1 or 2 View Left [[id number]] Collected: 10/02/23 1346     Updated: 10/02/23 1350    Narrative:      XR KNEE 1 OR 2 VW LEFT-        INDICATIONS: Postoperative evaluation.     TECHNIQUE: Frontal and lateral views of the left knee     COMPARISON: 9/29/2023     FINDINGS:      Intact appearing knee arthroplasty hardware is seen with adjacent  surgical soft tissue gas, overlying skin staples. No acute fracture is  identified.          Impression:         Postsurgical changes.              This report was finalized on 10/2/2023 1:47 PM by Dr. Avery Olivo M.D.             Past Medical History:   Diagnosis Date    Arthritis     Asthma     COPD (chronic obstructive pulmonary disease)     Degenerative disc disease, lumbar     CHRONIC    Diabetes mellitus     GERD (gastroesophageal reflux disease)     Hyperlipidemia     Hypertension     Neuropathy     CONOR FEET    Osteoarthritis     LEFT KNEE    Positive colorectal cancer screening using Cologuard  test     PVD (peripheral vascular disease)     Seasonal allergies     Sleep apnea     NO CURRENT DEVICE       Assessment:  Active Hospital Problems    Diagnosis  POA    **Status post knee replacement [Z96.659]  Not Applicable      Resolved Hospital Problems   No resolved problems to display.   Copd  Dm  Hypertension  Sleep apnea  Pad  Tobacco use    Plan:  Accu checks, insulin sliding scale  Monitor bp  Copd is at baseline  Thanks for involving us in her care  Will follow with you    Erica Monsivais MD   10/2/2023  23:15 EDT

## 2023-10-03 NOTE — THERAPY DISCHARGE NOTE
Patient Name: Yenny Crawford  : 1965    MRN: [id number]                              Today's Date: 10/3/2023       Admit Date: 10/2/2023    Visit Dx:     ICD-10-CM ICD-9-CM   1. Failed total knee arthroplasty  T84.018A 996.47    Z96.659 V43.65     Patient Active Problem List   Diagnosis    Status post knee replacement     Past Medical History:   Diagnosis Date    Arthritis     Asthma     COPD (chronic obstructive pulmonary disease)     Degenerative disc disease, lumbar     CHRONIC    Diabetes mellitus     GERD (gastroesophageal reflux disease)     Hyperlipidemia     Hypertension     Neuropathy     CONOR FEET    Osteoarthritis     LEFT KNEE    Positive colorectal cancer screening using Cologuard test     PVD (peripheral vascular disease)     Seasonal allergies     Sleep apnea     NO CURRENT DEVICE     Past Surgical History:   Procedure Laterality Date    APPENDECTOMY      ECTOPIC PREGNANCY      JOINT REPLACEMENT Bilateral     KNEE ARTHROPLASTY    WISDOM TOOTH EXTRACTION        General Information       Row Name 10/03/23 0833          Physical Therapy Time and Intention    Document Type discharge evaluation/summary  -EE     Mode of Treatment individual therapy;physical therapy  -EE       Row Name 10/03/23 0833          General Information    Prior Level of Function independent:;all household mobility;community mobility  -EE     Existing Precautions/Restrictions fall  -EE     Barriers to Rehab none identified  -EE       Row Name 10/03/23 0833          Living Environment    People in Home child(leanne), adult  -EE       Row Name 10/03/23 0833          Home Main Entrance    Number of Stairs, Main Entrance one  1 small threshold  -EE     Stair Railings, Main Entrance none;other (see comments)  pt states she can hold onto door  -EE       Row Name 10/03/23 0833          Stairs Within Home, Primary    Number of Stairs, Within Home, Primary none  -EE       Row Name 10/03/23 0833          Cognition    Orientation Status  (Cognition) oriented x 4  -EE       Row Name 10/03/23 0833          Safety Issues, Functional Mobility    Impairments Affecting Function (Mobility) balance;endurance/activity tolerance;range of motion (ROM);strength;pain  -EE               User Key  (r) = Recorded By, (t) = Taken By, (c) = Cosigned By      Initials Name Provider Type    EE Barby Burton PT Physical Therapist                   Mobility       Row Name 10/03/23 0834          Bed Mobility    Bed Mobility supine-sit;sit-supine  -EE     Supine-Sit [place] (Bed Mobility) modified independence  -EE     Sit-Supine [place] (Bed Mobility) modified independence  -EE     Assistive Device (Bed Mobility) head of bed elevated  -EE       Row Name 10/03/23 0834          Sit-Stand Transfer    Sit-Stand [place] (Transfers) standby assist;verbal cues  -EE     Assistive Device (Sit-Stand Transfers) walker, front-wheeled  -EE     Comment, (Sit-Stand Transfer) cues for hand placement and to keep L foot on floor during transfer  -EE       Row Name 10/03/23 0834          Gait/Stairs (Locomotion)    [place] Level (Gait) contact guard;standby assist;verbal cues  -EE     Assistive Device (Gait) walker, front-wheeled  -EE     Distance in Feet (Gait) 150' x 1  -EE     Deviations/Abnormal Patterns (Gait) left sided deviations;antalgic  -EE     Bilateral Gait Deviations forward flexed posture  -EE     Left Sided Gait Deviations heel strike decreased;weight shift ability decreased  -EE     Comment, (Gait/Stairs) Fast holland initially; cues to slow down for safety. Pt able to ambulate with step through gait pattern.  -EE               User Key  (r) = Recorded By, (t) = Taken By, (c) = Cosigned By      Initials Name Provider Type    Barby Hernandez PT Physical Therapist                   Obj/Interventions       Row Name 10/03/23 0836          Range of Motion Comprehensive    General Range of Motion lower extremity range of motion deficits identified  -EE      Comment, General Range of Motion L knee AROM 10-80. All other LE AROM WFL.  -EE       Row Name 10/03/23 0836          Strength Comprehensive (MMT)    General Manual Muscle Testing (MMT) Assessment lower extremity strength deficits identified  -EE     Comment, General Manual Muscle Testing (MMT) Assessment L quad grossly 3/5; R LE WFL  -EE       Row Name 10/03/23 0836          Motor Skills    Therapeutic Exercise --  L TKA protocol x 10 reps  -EE       Row Name 10/03/23 0836          Balance    Balance Assessment sitting static balance;standing static balance;standing dynamic balance  -EE     Static Sitting Balance independent  -EE     Position, Sitting Balance unsupported;sitting edge of bed  -EE     Static Standing Balance standby assist  -EE     Dynamic Standing Balance contact guard;standby assist  -EE     Position/Device Used, Standing Balance supported;walker, front-wheeled  -EE       Row Name 10/03/23 0836          Sensory Assessment (Somatosensory)    Sensory Assessment (Somatosensory) LE sensation intact  -EE               User Key  (r) = Recorded By, (t) = Taken By, (c) = Cosigned By      Initials Name Provider Type    EE Barby Burton, KEVIN Physical Therapist                   Goals/Plan    No documentation.                  Clinical Impression       Row Name 10/03/23 0837          Pain    Pretreatment Pain Rating 9/10  -EE     Posttreatment Pain Rating 9/10  -EE     Pain Location - Side/Orientation Left  -EE     Pain Location incisional  -EE     Pain Location - knee  -EE     Pain Intervention(s) Repositioned;Elevated;Cold applied;Medication (See MAR)  -EE       Row Name 10/03/23 0837          Plan of Care Review    Plan of Care Reviewed With patient  -EE     Outcome Evaluation Pt is a 59 yo female who presents s/p L knee revision. Prior to admission, pt was living at home and independent with all mobility. Upon exam, pt demonstrates post op L knee pain, impaired L knee ROM, L LE weakness, impaired gait  mechanics, and decreased endurance limiting mobility. Pt was able to ambulate in hallway with rwx and CGA/SBA. Pt completed all L TKA exercises independently. Pt has rwx at home and plans to DC home today and continue with HH PT.  -EE       Row Name 10/03/23 0837          Therapy Assessment/Plan (PT)    Criteria for Skilled Interventions Met (PT) yes;meets criteria;other (see comments)  no further acute PT indicated; rec continuing with  PT  -EE     Therapy Frequency (PT) evaluation only  -EE       Row Name 10/03/23 0837          Positioning and Restraints    Pre-Treatment Position in bed  -EE     Post Treatment Position bed  -EE     In Bed supine;call light within reach;encouraged to call for assist;exit alarm on;notified nsg  ice pack applied L knee  -EE               User Key  (r) = Recorded By, (t) = Taken By, (c) = Cosigned By      Initials Name Provider Type    Barby Hernandez PT Physical Therapist                   Outcome Measures       Row Name 10/03/23 0847          How much help from another person do you currently need...    Turning from your back to your side while in flat bed without using bedrails? 4  -EE     Moving from lying on back to sitting on the side of a flat bed without bedrails? 4  -EE     Moving to and from a bed to a chair (including a wheelchair)? 3  -EE     Standing up from a chair using your arms (e.g., wheelchair, bedside chair)? 3  -EE     Climbing 3-5 steps with a railing? 3  -EE     To walk in hospital room? 3  -EE     AM-PAC 6 Clicks Score (PT) 20  -EE     Highest level of mobility 6 --> Walked 10 steps or more  -EE       Row Name 10/03/23 0847          Functional Assessment    Outcome Measure Options AM-PAC 6 Clicks Basic Mobility (PT)  -EE               User Key  (r) = Recorded By, (t) = Taken By, (c) = Cosigned By      Initials Name Provider Type    Barby Hernandez PT Physical Therapist                  Physical Therapy Education       Title: PT OT SLP Therapies (Resolved)        Topic: Physical Therapy (Resolved)       Point: Mobility training (Resolved)       Learning Progress Summary             Patient Acceptance, E,TB,D, VU,DU by EE at 10/3/2023 0847                         Point: Home exercise program (Resolved)       Learning Progress Summary             Patient Acceptance, E,TB,D, VU,DU by EE at 10/3/2023 0847                         Point: Body mechanics (Resolved)       Learning Progress Summary             Patient Acceptance, E,TB,D, VU,DU by EE at 10/3/2023 0847                         Point: Precautions (Resolved)       Learning Progress Summary             Patient Acceptance, E,TB,D, VU,DU by EE at 10/3/2023 0847                                         User Key       Initials Effective Dates Name Provider Type Discipline    EE 06/16/21 -  Barby Burton, PT Physical Therapist PT                  PT Recommendation and Plan     Plan of Care Reviewed With: patient  Outcome Evaluation: Pt is a 57 yo female who presents s/p L knee revision. Prior to admission, pt was living at home and independent with all mobility. Upon exam, pt demonstrates post op L knee pain, impaired L knee ROM, L LE weakness, impaired gait mechanics, and decreased endurance limiting mobility. Pt was able to ambulate in hallway with rwx and CGA/SBA. Pt completed all L TKA exercises independently. Pt has rwx at home and plans to DC home today and continue with  PT.     Time Calculation:         PT Charges       Row Name 10/03/23 0847             Time Calculation    Start Time 0822  -EE      Stop Time 0844  -EE      Time Calculation (min) 22 min  -EE      PT Received On 10/03/23  -EE         Time Calculation- PT    Total Timed Code Minutes- PT 13 minute(s)  -EE         Timed Charges    17676 - PT Therapeutic Exercise Minutes 13  -EE         Total Minutes    Timed Charges Total Minutes 13  -EE       Total Minutes 13  -EE                User Key  (r) = Recorded By, (t) = Taken By, (c) = Cosigned By       Initials Name Provider Type     Barby Burton, PT Physical Therapist                  Therapy Charges for Today       Code Description Service Date Service Provider Modifiers Qty    73446071934 HC PT EVAL LOW COMPLEXITY 2 10/3/2023 Barby Burton, PT GP 1    63723406537 HC PT THER PROC EA 15 MIN 10/3/2023 Barby Burton, PT GP 1            PT G-Codes  Outcome Measure Options: AM-PAC 6 Clicks Basic Mobility (PT)  AM-PAC 6 Clicks Score (PT): 20    PT Discharge Summary  Anticipated Discharge Disposition (PT): home with home health, home with assist    Barby Burton, KEVIN  10/3/2023

## 2023-10-04 ENCOUNTER — HOME CARE VISIT (OUTPATIENT)
Dept: HOME HEALTH SERVICES | Facility: HOME HEALTHCARE | Age: 58
End: 2023-10-04
Payer: COMMERCIAL

## 2023-10-04 PROCEDURE — G0151 HHCP-SERV OF PT,EA 15 MIN: HCPCS

## 2023-10-04 NOTE — Clinical Note
Patient seen for HH PT on 10/4 and will follow until her outpt PT on 10/16.  Her ROXIE dressing is over 50% blood soaked (dried blood) but pump was working.  Will assess need for dressing change on 10/6.  Thank you.

## 2023-10-04 NOTE — CASE MANAGEMENT/SOCIAL WORK
Case Management Discharge Note      Final Note: [facility].    Provided Post Acute Provider List?: N/A  N/A Provider List Comment: Declined.    Selected Continued Care - Discharged on 10/3/2023 Admission date: 10/2/2023 - Discharge disposition: Home or Self Care      Destination    No services have been selected for the patient.                Durable Medical Equipment    No services have been selected for the patient.                Dialysis/Infusion    No services have been selected for the patient.                Home Medical Care Coordination complete.      Service Provider Selected Services Address Phone Fax Patient Preferred    [facility] [address] [address] [phone] --              Therapy    No services have been selected for the patient.                Community Resources    No services have been selected for the patient.                Community & DME    No services have been selected for the patient.                         Final Discharge Disposition Code: 06 - home with home health care

## 2023-10-04 NOTE — PAYOR COMM NOTE
"Yenny Breg (58 y.o. Female)        PLEASE SEE ATTACHED DC SUMMARY    REF#[id number]     THANK YOU    TERRI TOBIAS LPN CCP   Date of Birth   1965    Social Security Number       Address   [address] [facility] [address]    Home Phone   [phone]    MRN   [id number]       [facility]    Marital Status                               Admission Date   10/2/23    Admission Type   Elective    Admitting Provider   Luis Ansari II, MD    Attending Provider       Department, Room/Bed   [facility] [address] [place], P885/1       Discharge Date   10/3/2023    Discharge Disposition   Home or Self Care    Discharge Destination                                 Attending Provider: (none)   Allergies: Latex    Isolation: None   Infection: None   Code Status: Prior    Ht: 172.1 cm (67.76\")   Wt: 93 kg (205 lb 0.4 oz)    Admission Cmt: None   Principal Problem: Status post knee replacement [Z96.659]                   Active Insurance as of 10/2/2023       Primary Coverage       Payor Plan Insurance Group Employer/Plan Group    [facility] BY GOSIA [facility] BY GOSIA [id number]       Payor Plan Address Payor Plan Phone Number Payor Plan Fax Number Effective Dates    [address] [address]   2021 - None Entered    [facility] [address]         Subscriber Name Subscriber Birth Date Member ID       YENNY BERG 1965 [id number]                     Emergency Contacts        (Rel.) Home Phone Work Phone Mobile Phone    CHUCKY CELAYA (Daughter) [phone] -- [phone]                 Discharge Summary        Luis Ansari II, MD at 10/03/23 0635            Orthopaedic Discharge Summary  Dr. TATE “Marino” Elan GONZALEZ  [phone]    NAME: Yenyn ANDRES Ty PCP: Kassy Antonio, APRN   :  MRN: [id number]  [id number] LOS:  ADMIT: 0 days  10/2/2023   AGE/SEX: 58 y.o. female DC:  today             Admitting Diagnosis: Status post knee " replacement [Z96.659]    Surgery Performed: TOTAL KNEE ARTHROPLASTY REVISION WITH CORI ROBOT    Discharge Medications:         Discharge Medications        New Medications        Instructions Start Date   aspirin 81 MG EC tablet   81 mg, Oral, Every 12 Hours      ondansetron 4 MG tablet  Commonly known as: Zofran   4 mg, Oral, Every 6 Hours PRN      oxyCODONE-acetaminophen 5-325 MG per tablet  Commonly known as: PERCOCET   1 tablet, Oral, Every 4 Hours PRN             Continue These Medications        Instructions Start Date   acyclovir 400 MG tablet  Commonly known as: ZOVIRAX   400 mg, Oral, Daily PRN      albuterol sulfate  (90 Base) MCG/ACT inhaler  Commonly known as: PROVENTIL HFA;VENTOLIN HFA;PROAIR HFA   2 puffs, Inhalation, Every 4 Hours PRN      CHLORHEXIDINE GLUCONATE CLOTH EX   Apply externally, USE AS DIRECTED      Cholecalciferol 50 MCG (2000 UT) tablet   2,000 Units, Oral, Daily, HOLDING FOR DOS      Cyanocobalamin 1000 MCG/ML kit   1,000 mcg, Intramuscular, Every 30 Days      docusate sodium 100 MG capsule   100 mg, Oral, 2 Times Daily      fish oil 1200 MG capsule capsule   1,200 mg, Oral, Daily With Breakfast, HOLDING FOR DOS      gabapentin 600 MG tablet  Commonly known as: NEURONTIN   600 mg, Oral, 3 Times Daily      Jardiance 10 MG tablet tablet  Generic drug: empagliflozin   10 mg, Oral, Daily      loratadine 10 MG tablet  Commonly known as: CLARITIN   10 mg, Oral, Daily      metFORMIN 850 MG tablet  Commonly known as: GLUCOPHAGE   850 mg, Oral, 2 Times Daily With Meals, NOT CURRENTLY TAKING      metoprolol tartrate 25 MG tablet  Commonly known as: LOPRESSOR   25 mg, Oral, 2 Times Daily      nitroglycerin 0.4 MG SL tablet  Commonly known as: NITROSTAT   0.4 mg, Sublingual, Every 5 Minutes PRN      simvastatin 10 MG tablet  Commonly known as: ZOCOR   10 mg, Oral, Nightly      tiotropium bromide monohydrate 2.5 MCG/ACT aerosol solution inhaler  Commonly known as: SPIRIVA RESPIMAT   2 puffs,  "Inhalation, Daily - RT      TiZANidine 2 MG capsule  Commonly known as: ZANAFLEX   2 mg, Oral, 3 Times Daily               Vitals:     Vitals:    10/02/23 1454 10/02/23 1832 10/02/23 2200 10/03/23 0153   BP: 169/87 160/80 131/81 110/65   BP Location: Right arm Right arm Right arm Left arm   Patient Position: Lying Lying Lying Lying   Pulse: 61 68 83 59   Resp: 16 16 16 16   Temp: 99.3 °F (37.4 °C) 97.9 °F (36.6 °C) 98.7 °F (37.1 °C) 98.7 °F (37.1 °C)   TempSrc: Oral Oral Oral Oral   SpO2: 98% 96% 93% 92%   Weight: 93 kg (205 lb 0.4 oz)      Height: 172.1 cm (67.76\")          Labs:      Admission on 10/02/2023   Component Date Value Ref Range Status    Glucose 10/02/2023 118  70 - 130 mg/dL Final    Gram Stain 10/02/2023 Rare (1+) WBCs seen   Preliminary    Gram Stain 10/02/2023 No organisms seen   Preliminary    Gram Stain 10/02/2023 No WBCs or organisms seen   Preliminary    Glucose 10/02/2023 127  70 - 130 mg/dL Final    Glucose 10/02/2023 148 (H)  70 - 130 mg/dL Final    Glucose 10/03/2023 140 (H)  65 - 99 mg/dL Final    BUN 10/03/2023 16  6 - 20 mg/dL Final    Creatinine 10/03/2023 0.85  0.57 - 1.00 mg/dL Final    Sodium 10/03/2023 136  136 - 145 mmol/L Final    Potassium 10/03/2023 4.6  3.5 - 5.2 mmol/L Final    Chloride 10/03/2023 103  98 - 107 mmol/L Final    CO2 10/03/2023 25.7  22.0 - 29.0 mmol/L Final    Calcium 10/03/2023 8.5 (L)  8.6 - 10.5 mg/dL Final    BUN/Creatinine Ratio 10/03/2023 18.8  7.0 - 25.0 Final    Anion Gap 10/03/2023 7.3  5.0 - 15.0 mmol/L Final    eGFR 10/03/2023 79.5  >60.0 mL/min/1.73 Final    WBC 10/03/2023 12.43 (H)  3.40 - 10.80 10*3/mm3 Final    RBC 10/03/2023 3.79  3.77 - 5.28 10*6/mm3 Final    Hemoglobin 10/03/2023 11.5 (L)  12.0 - 15.9 g/dL Final    Hematocrit 10/03/2023 34.3  34.0 - 46.6 % Final    MCV 10/03/2023 90.5  79.0 - 97.0 fL Final    MCH 10/03/2023 30.3  26.6 - 33.0 pg Final    MCHC 10/03/2023 33.5  31.5 - 35.7 g/dL Final    RDW 10/03/2023 12.8  12.3 - 15.4 % Final    " "RDW-SD 10/03/2023 42.2  37.0 - 54.0 fl Final    MPV 10/03/2023 9.3  6.0 - 12.0 fL Final    Platelets 10/03/2023 180  140 - 450 10*3/mm3 Final    Glucose 10/02/2023 133 (H)  70 - 130 mg/dL Final        No results found.    Hospital Course:   58 y.o. female was admitted to [facility], operated by [facility] to services of Melanie Berry II, MD with Status post knee replacement [Z96.659] on 10/2/2023 and underwent TOTAL KNEE ARTHROPLASTY REVISION WITH CORI ROBOT. Post-operatively the patient transferred to the floor where the patient underwent mobilization therapy. Opioids were titrated to achieve appropriate pain management to allow for participation in mobilization exercises. Vital signs and laboratory values are now within safe parameters for discharge. The dressings and/or incision is intact without signs or symptoms of active infection. Operative extremity neurovascular status remains intact as compared to the preoperative exam. Appropriate education re: incision care, activity levels, medications, and follow up visits was completed and all questions were answered. The patient is now deemed stable for discharge.    HOME: The patient progressed well with physical therapy. There were cleared for discharge to home. The deloris was sent home in good condition}.       R \"Marino\" Elan GONZALEZ MD  Orthopaedic Surgery  [facility]  [phone]                                                 Electronically signed by Melanie Berry II, MD at 10/03/23 0635       Discharge Order (From admission, onward)       Start     Ordered    10/03/23 1211  Discharge patient  Once        Expected Discharge Date: 10/03/23   Discharge Disposition: Home or Self Care   Physician of Record for Attribution - Please select from Treatment Team: MELANIE BERRY II [[id number]]   Review needed by CMO to determine Physician of Record: No      Question Answer Comment   Physician of Record for Attribution - Please select from " Treatment Team MELANIE BERRY II    Review needed by CMO to determine Physician of Record No        10/03/23 1211    10/03/23 0635  Discharge patient  Once,   Status:  Canceled        Expected Discharge Date: 10/03/23   Discharge Disposition: Home or Self Care   Physician of Record for Attribution - Please select from Treatment Team: MELANIE BERRY II [[id number]]   Review needed by CMO to determine Physician of Record: No      Question Answer Comment   Physician of Record for Attribution - Please select from Treatment Team MELANIE BERRY II    Review needed by CMO to determine Physician of Record No        10/03/23 0635

## 2023-10-05 ENCOUNTER — HOME CARE VISIT (OUTPATIENT)
Dept: HOME HEALTH SERVICES | Facility: HOME HEALTHCARE | Age: 58
End: 2023-10-05
Payer: COMMERCIAL

## 2023-10-05 VITALS
RESPIRATION RATE: 18 BRPM | DIASTOLIC BLOOD PRESSURE: 86 MMHG | OXYGEN SATURATION: 93 % | HEART RATE: 84 BPM | TEMPERATURE: 97.5 F | SYSTOLIC BLOOD PRESSURE: 128 MMHG

## 2023-10-05 LAB
BACTERIA SPEC AEROBE CULT: NORMAL
BACTERIA SPEC AEROBE CULT: NORMAL
GRAM STN SPEC: NORMAL

## 2023-10-05 NOTE — HOME HEALTH
Patient had left TKR (revision) on 10/2/23 per Dr. Ansari.  She has outpt already set for 10/16/23.  Her ROXIE dressing is over 50% saturated with dried blood, but the pump remains operational. Hematoma seen under the dressing mid knee, but no warmth and no s/s of infection.   Her edema is average.  Calf is soft and no s/s of DVT.  She lives in a small, one level apartment.  Several family members/friends present today.  Her PLOF is independent with her ADLs with no AD.  She has PMH of COPD with present tobacco use, DM2, chronic back pain, peripheral neuropathy, CAD, right TKR.    Assessment:  Skilled PT FOC to guide her through the acute phase of post TKR revision recovery, including pain management, transfer training, progressive HEP/knee ROM and gait training for safety.    Plan for next visit  Reassess dressing and if it needs to be changed.    Progress ROM as tolerated.   Amend and progress HEP as tolerated.

## 2023-10-06 ENCOUNTER — HOME CARE VISIT (OUTPATIENT)
Dept: HOME HEALTH SERVICES | Facility: HOME HEALTHCARE | Age: 58
End: 2023-10-06
Payer: COMMERCIAL

## 2023-10-07 LAB
BACTERIA SPEC ANAEROBE CULT: NORMAL
BACTERIA SPEC ANAEROBE CULT: NORMAL

## 2023-10-09 ENCOUNTER — HOME CARE VISIT (OUTPATIENT)
Dept: HOME HEALTH SERVICES | Facility: HOME HEALTHCARE | Age: 58
End: 2023-10-09
Payer: COMMERCIAL

## 2023-10-09 VITALS
DIASTOLIC BLOOD PRESSURE: 84 MMHG | RESPIRATION RATE: 16 BRPM | HEART RATE: 71 BPM | OXYGEN SATURATION: 98 % | SYSTOLIC BLOOD PRESSURE: 138 MMHG | TEMPERATURE: 97.1 F

## 2023-10-09 PROCEDURE — G0151 HHCP-SERV OF PT,EA 15 MIN: HCPCS

## 2023-10-09 NOTE — HOME HEALTH
Plan for next visit:  add more flexion activity    Subjective:  I've been changing the dressing.  Cleaning with iodine and using a wound cleanser.  Was instructed to back off of exercise due to excessive bleeding until today.    Falls since last visit: fell in doctor's office  Home/Social Environment:  lives with family in home with no steps

## 2023-10-11 ENCOUNTER — HOME CARE VISIT (OUTPATIENT)
Dept: HOME HEALTH SERVICES | Facility: HOME HEALTHCARE | Age: 58
End: 2023-10-11
Payer: COMMERCIAL

## 2023-10-11 VITALS
OXYGEN SATURATION: 98 % | DIASTOLIC BLOOD PRESSURE: 78 MMHG | RESPIRATION RATE: 16 BRPM | HEART RATE: 92 BPM | TEMPERATURE: 96.6 F | SYSTOLIC BLOOD PRESSURE: 124 MMHG

## 2023-10-11 PROCEDURE — G0151 HHCP-SERV OF PT,EA 15 MIN: HCPCS

## 2023-10-11 NOTE — HOME HEALTH
Plan for next visit:  OASIS discharge    Subjective:  I've had a staple break loose.  I changed the bandage.  It's OK it didn't bleed.    Falls since last visit: fell in doctor's office  Home/Social Environment:  lives with family in home with no steps

## 2023-10-13 ENCOUNTER — HOME CARE VISIT (OUTPATIENT)
Dept: HOME HEALTH SERVICES | Facility: HOME HEALTHCARE | Age: 58
End: 2023-10-13
Payer: COMMERCIAL

## 2023-10-13 VITALS
HEART RATE: 106 BPM | DIASTOLIC BLOOD PRESSURE: 74 MMHG | OXYGEN SATURATION: 95 % | SYSTOLIC BLOOD PRESSURE: 113 MMHG | RESPIRATION RATE: 16 BRPM

## 2023-10-13 PROCEDURE — G0151 HHCP-SERV OF PT,EA 15 MIN: HCPCS

## 2023-10-13 NOTE — HOME HEALTH
Subjective:  Oh I'm OK.  I'm tired and sleepy.      Falls since last visit: fell in doctor's office  Home/Social Environment:  lives with family in home with no steps

## 2024-04-30 ENCOUNTER — HOSPITAL ENCOUNTER (INPATIENT)
Facility: HOSPITAL | Age: 59
LOS: 2 days | Discharge: HOME | DRG: 617 | End: 2024-05-03
Attending: EMERGENCY MEDICINE | Admitting: STUDENT IN AN ORGANIZED HEALTH CARE EDUCATION/TRAINING PROGRAM
Payer: COMMERCIAL

## 2024-04-30 ENCOUNTER — APPOINTMENT (OUTPATIENT)
Dept: GENERAL RADIOLOGY | Facility: HOSPITAL | Age: 59
DRG: 617 | End: 2024-04-30
Payer: COMMERCIAL

## 2024-04-30 DIAGNOSIS — M86.9 OSTEOMYELITIS OF GREAT TOE OF RIGHT FOOT: ICD-10-CM

## 2024-04-30 DIAGNOSIS — L08.9 TOE INFECTION: Primary | ICD-10-CM

## 2024-04-30 LAB
ALBUMIN SERPL-MCNC: 3.9 G/DL (ref 3.5–5.2)
ALBUMIN/GLOB SERPL: 1.4 G/DL
ALP SERPL-CCNC: 104 U/L (ref 39–117)
ALT SERPL W P-5'-P-CCNC: 10 U/L (ref 1–33)
ANION GAP SERPL CALCULATED.3IONS-SCNC: 4.8 MMOL/L (ref 5–15)
APTT PPP: 24.2 SECONDS (ref 22.7–35.4)
AST SERPL-CCNC: 14 U/L (ref 1–32)
BASOPHILS # BLD AUTO: 0.05 10*3/MM3 (ref 0–0.2)
BASOPHILS NFR BLD AUTO: 0.6 % (ref 0–1.5)
BILIRUB SERPL-MCNC: <0.2 MG/DL (ref 0–1.2)
BUN SERPL-MCNC: 16 MG/DL (ref 6–20)
BUN/CREAT SERPL: 22.5 (ref 7–25)
CALCIUM SPEC-SCNC: 9.6 MG/DL (ref 8.6–10.5)
CHLORIDE SERPL-SCNC: 103 MMOL/L (ref 98–107)
CO2 SERPL-SCNC: 32.2 MMOL/L (ref 22–29)
CREAT SERPL-MCNC: 0.71 MG/DL (ref 0.57–1)
CRP SERPL-MCNC: 4.34 MG/DL (ref 0–0.5)
D-LACTATE SERPL-SCNC: 1.1 MMOL/L (ref 0.5–2)
DEPRECATED RDW RBC AUTO: 42.2 FL (ref 37–54)
EGFRCR SERPLBLD CKD-EPI 2021: 98.7 ML/MIN/1.73
EOSINOPHIL # BLD AUTO: 0.26 10*3/MM3 (ref 0–0.4)
EOSINOPHIL NFR BLD AUTO: 3.1 % (ref 0.3–6.2)
ERYTHROCYTE [DISTWIDTH] IN BLOOD BY AUTOMATED COUNT: 13.3 % (ref 12.3–15.4)
ERYTHROCYTE [SEDIMENTATION RATE] IN BLOOD: 25 MM/HR (ref 0–30)
GLOBULIN UR ELPH-MCNC: 2.8 GM/DL
GLUCOSE SERPL-MCNC: 123 MG/DL (ref 65–99)
HCT VFR BLD AUTO: 36.9 % (ref 34–46.6)
HGB BLD-MCNC: 12.1 G/DL (ref 12–15.9)
IMM GRANULOCYTES # BLD AUTO: 0.04 10*3/MM3 (ref 0–0.05)
IMM GRANULOCYTES NFR BLD AUTO: 0.5 % (ref 0–0.5)
INR PPP: 1.03 (ref 0.9–1.1)
LYMPHOCYTES # BLD AUTO: 3.29 10*3/MM3 (ref 0.7–3.1)
LYMPHOCYTES NFR BLD AUTO: 39.2 % (ref 19.6–45.3)
MCH RBC QN AUTO: 28.5 PG (ref 26.6–33)
MCHC RBC AUTO-ENTMCNC: 32.8 G/DL (ref 31.5–35.7)
MCV RBC AUTO: 87 FL (ref 79–97)
MONOCYTES # BLD AUTO: 0.84 10*3/MM3 (ref 0.1–0.9)
MONOCYTES NFR BLD AUTO: 10 % (ref 5–12)
NEUTROPHILS NFR BLD AUTO: 3.91 10*3/MM3 (ref 1.7–7)
NEUTROPHILS NFR BLD AUTO: 46.6 % (ref 42.7–76)
NRBC BLD AUTO-RTO: 0 /100 WBC (ref 0–0.2)
PLATELET # BLD AUTO: 235 10*3/MM3 (ref 140–450)
PMV BLD AUTO: 9.5 FL (ref 6–12)
POTASSIUM SERPL-SCNC: 4.5 MMOL/L (ref 3.5–5.2)
PROT SERPL-MCNC: 6.7 G/DL (ref 6–8.5)
PROTHROMBIN TIME: 13.7 SECONDS (ref 11.7–14.2)
RBC # BLD AUTO: 4.24 10*6/MM3 (ref 3.77–5.28)
SODIUM SERPL-SCNC: 140 MMOL/L (ref 136–145)
WBC NRBC COR # BLD AUTO: 8.39 10*3/MM3 (ref 3.4–10.8)

## 2024-04-30 PROCEDURE — 85610 PROTHROMBIN TIME: CPT | Performed by: PHYSICIAN ASSISTANT

## 2024-04-30 PROCEDURE — 99285 EMERGENCY DEPT VISIT HI MDM: CPT

## 2024-04-30 PROCEDURE — 73630 X-RAY EXAM OF FOOT: CPT

## 2024-04-30 PROCEDURE — 83605 ASSAY OF LACTIC ACID: CPT | Performed by: PHYSICIAN ASSISTANT

## 2024-04-30 PROCEDURE — 85730 THROMBOPLASTIN TIME PARTIAL: CPT | Performed by: PHYSICIAN ASSISTANT

## 2024-04-30 PROCEDURE — 85652 RBC SED RATE AUTOMATED: CPT | Performed by: PHYSICIAN ASSISTANT

## 2024-04-30 PROCEDURE — 86140 C-REACTIVE PROTEIN: CPT | Performed by: PHYSICIAN ASSISTANT

## 2024-04-30 PROCEDURE — 25010000002 CEFEPIME PER 500 MG: Performed by: PHYSICIAN ASSISTANT

## 2024-04-30 PROCEDURE — 36415 COLL VENOUS BLD VENIPUNCTURE: CPT

## 2024-04-30 PROCEDURE — 80053 COMPREHEN METABOLIC PANEL: CPT | Performed by: PHYSICIAN ASSISTANT

## 2024-04-30 PROCEDURE — 85025 COMPLETE CBC W/AUTO DIFF WBC: CPT | Performed by: PHYSICIAN ASSISTANT

## 2024-04-30 RX ADMIN — CEFEPIME 2000 MG: 2 INJECTION, POWDER, FOR SOLUTION INTRAVENOUS at 23:51

## 2024-05-01 ENCOUNTER — APPOINTMENT (OUTPATIENT)
Dept: CARDIOLOGY | Facility: HOSPITAL | Age: 59
DRG: 617 | End: 2024-05-01
Payer: COMMERCIAL

## 2024-05-01 ENCOUNTER — APPOINTMENT (OUTPATIENT)
Dept: MRI IMAGING | Facility: HOSPITAL | Age: 59
DRG: 617 | End: 2024-05-01
Payer: COMMERCIAL

## 2024-05-01 PROBLEM — L08.9 TOE INFECTION: Status: ACTIVE | Noted: 2024-05-01

## 2024-05-01 PROBLEM — M86.9 OSTEOMYELITIS OF TOE: Status: ACTIVE | Noted: 2024-05-01

## 2024-05-01 PROBLEM — L03.031 CELLULITIS OF TOE OF RIGHT FOOT: Status: ACTIVE | Noted: 2024-05-01

## 2024-05-01 PROBLEM — F31.31 BIPOLAR DISORDER, CURRENT EPISODE DEPRESSED, MILD: Chronic | Status: ACTIVE | Noted: 2023-09-21

## 2024-05-01 LAB
ANION GAP SERPL CALCULATED.3IONS-SCNC: 12.1 MMOL/L (ref 5–15)
BH CV LOWER ARTERIAL LEFT ABI RATIO: 1.26
BH CV LOWER ARTERIAL LEFT DORSALIS PEDIS SYS MAX: 154
BH CV LOWER ARTERIAL LEFT GREAT TOE SYS MAX: 103
BH CV LOWER ARTERIAL LEFT POST TIBIAL SYS MAX: 137
BH CV LOWER ARTERIAL LEFT TBI RATIO: 0.84
BH CV LOWER ARTERIAL RIGHT 2ND DIGIT SYS MAX: NORMAL
BH CV LOWER ARTERIAL RIGHT 3RD DIGIT SYS MAX: NORMAL
BH CV LOWER ARTERIAL RIGHT 4TH DIGIT SYS MAX: 99
BH CV LOWER ARTERIAL RIGHT ABI RATIO: 1.21
BH CV LOWER ARTERIAL RIGHT DORSALIS PEDIS SYS MAX: 140
BH CV LOWER ARTERIAL RIGHT POST TIBIAL SYS MAX: 148
BH CV LOWER ARTERIAL RIGHT TBI RATIO: 0.81
BUN SERPL-MCNC: 16 MG/DL (ref 6–20)
BUN/CREAT SERPL: 27.1 (ref 7–25)
CALCIUM SPEC-SCNC: 9.2 MG/DL (ref 8.6–10.5)
CHLORIDE SERPL-SCNC: 103 MMOL/L (ref 98–107)
CO2 SERPL-SCNC: 24.9 MMOL/L (ref 22–29)
CREAT SERPL-MCNC: 0.59 MG/DL (ref 0.57–1)
DEPRECATED RDW RBC AUTO: 43.8 FL (ref 37–54)
EGFRCR SERPLBLD CKD-EPI 2021: 104.6 ML/MIN/1.73
ERYTHROCYTE [DISTWIDTH] IN BLOOD BY AUTOMATED COUNT: 13.3 % (ref 12.3–15.4)
GLUCOSE BLDC GLUCOMTR-MCNC: 135 MG/DL (ref 70–130)
GLUCOSE BLDC GLUCOMTR-MCNC: 137 MG/DL (ref 70–130)
GLUCOSE BLDC GLUCOMTR-MCNC: 142 MG/DL (ref 70–130)
GLUCOSE BLDC GLUCOMTR-MCNC: 165 MG/DL (ref 70–130)
GLUCOSE SERPL-MCNC: 106 MG/DL (ref 65–99)
HCT VFR BLD AUTO: 39 % (ref 34–46.6)
HGB BLD-MCNC: 13 G/DL (ref 12–15.9)
MCH RBC QN AUTO: 29.6 PG (ref 26.6–33)
MCHC RBC AUTO-ENTMCNC: 33.3 G/DL (ref 31.5–35.7)
MCV RBC AUTO: 88.8 FL (ref 79–97)
PLATELET # BLD AUTO: 224 10*3/MM3 (ref 140–450)
PMV BLD AUTO: 9.2 FL (ref 6–12)
POTASSIUM SERPL-SCNC: 4.3 MMOL/L (ref 3.5–5.2)
RBC # BLD AUTO: 4.39 10*6/MM3 (ref 3.77–5.28)
SODIUM SERPL-SCNC: 140 MMOL/L (ref 136–145)
UPPER ARTERIAL LEFT ARM BRACHIAL SYS MAX: 122
UPPER ARTERIAL RIGHT ARM BRACHIAL SYS MAX: 116
WBC NRBC COR # BLD AUTO: 8.62 10*3/MM3 (ref 3.4–10.8)

## 2024-05-01 PROCEDURE — 82948 REAGENT STRIP/BLOOD GLUCOSE: CPT

## 2024-05-01 PROCEDURE — 93922 UPR/L XTREMITY ART 2 LEVELS: CPT

## 2024-05-01 PROCEDURE — 85027 COMPLETE CBC AUTOMATED: CPT | Performed by: NURSE PRACTITIONER

## 2024-05-01 PROCEDURE — 25010000002 VANCOMYCIN HCL 1.25 G RECONSTITUTED SOLUTION 1 EACH VIAL: Performed by: NURSE PRACTITIONER

## 2024-05-01 PROCEDURE — 93923 UPR/LXTR ART STDY 3+ LVLS: CPT

## 2024-05-01 PROCEDURE — 73718 MRI LOWER EXTREMITY W/O DYE: CPT

## 2024-05-01 PROCEDURE — 63710000001 INSULIN LISPRO (HUMAN) PER 5 UNITS: Performed by: NURSE PRACTITIONER

## 2024-05-01 PROCEDURE — 36415 COLL VENOUS BLD VENIPUNCTURE: CPT | Performed by: NURSE PRACTITIONER

## 2024-05-01 PROCEDURE — 80048 BASIC METABOLIC PNL TOTAL CA: CPT | Performed by: NURSE PRACTITIONER

## 2024-05-01 PROCEDURE — 87205 SMEAR GRAM STAIN: CPT | Performed by: NURSE PRACTITIONER

## 2024-05-01 PROCEDURE — 25010000002 VANCOMYCIN 10 G RECONSTITUTED SOLUTION: Performed by: PHYSICIAN ASSISTANT

## 2024-05-01 PROCEDURE — 93922 UPR/L XTREMITY ART 2 LEVELS: CPT | Performed by: STUDENT IN AN ORGANIZED HEALTH CARE EDUCATION/TRAINING PROGRAM

## 2024-05-01 PROCEDURE — 25810000003 SODIUM CHLORIDE 0.9 % SOLUTION: Performed by: PHYSICIAN ASSISTANT

## 2024-05-01 PROCEDURE — 25010000002 CEFEPIME PER 500 MG: Performed by: STUDENT IN AN ORGANIZED HEALTH CARE EDUCATION/TRAINING PROGRAM

## 2024-05-01 PROCEDURE — 87077 CULTURE AEROBIC IDENTIFY: CPT | Performed by: NURSE PRACTITIONER

## 2024-05-01 PROCEDURE — 87070 CULTURE OTHR SPECIMN AEROBIC: CPT | Performed by: NURSE PRACTITIONER

## 2024-05-01 PROCEDURE — 87186 SC STD MICRODIL/AGAR DIL: CPT | Performed by: NURSE PRACTITIONER

## 2024-05-01 PROCEDURE — 25810000003 SODIUM CHLORIDE 0.9 % SOLUTION 250 ML FLEX CONT: Performed by: NURSE PRACTITIONER

## 2024-05-01 RX ORDER — GABAPENTIN 300 MG/1
600 CAPSULE ORAL 3 TIMES DAILY
Status: DISCONTINUED | OUTPATIENT
Start: 2024-05-01 | End: 2024-05-03 | Stop reason: HOSPADM

## 2024-05-01 RX ORDER — NICOTINE POLACRILEX 4 MG
15 LOZENGE BUCCAL
Status: DISCONTINUED | OUTPATIENT
Start: 2024-05-01 | End: 2024-05-03 | Stop reason: HOSPADM

## 2024-05-01 RX ORDER — ACYCLOVIR 400 MG/1
400 TABLET ORAL DAILY
Status: DISCONTINUED | OUTPATIENT
Start: 2024-05-01 | End: 2024-05-03 | Stop reason: HOSPADM

## 2024-05-01 RX ORDER — LORAZEPAM 1 MG/1
1 TABLET ORAL ONCE
Status: COMPLETED | OUTPATIENT
Start: 2024-05-01 | End: 2024-05-01

## 2024-05-01 RX ORDER — ACETAMINOPHEN 160 MG/5ML
650 SOLUTION ORAL EVERY 4 HOURS PRN
Status: DISCONTINUED | OUTPATIENT
Start: 2024-05-01 | End: 2024-05-03 | Stop reason: HOSPADM

## 2024-05-01 RX ORDER — BISACODYL 5 MG/1
5 TABLET, DELAYED RELEASE ORAL DAILY PRN
Status: DISCONTINUED | OUTPATIENT
Start: 2024-05-01 | End: 2024-05-03 | Stop reason: HOSPADM

## 2024-05-01 RX ORDER — NITROGLYCERIN 0.4 MG/1
0.4 TABLET SUBLINGUAL
Status: DISCONTINUED | OUTPATIENT
Start: 2024-05-01 | End: 2024-05-03 | Stop reason: HOSPADM

## 2024-05-01 RX ORDER — ACETAMINOPHEN 325 MG/1
650 TABLET ORAL EVERY 4 HOURS PRN
Status: DISCONTINUED | OUTPATIENT
Start: 2024-05-01 | End: 2024-05-03 | Stop reason: HOSPADM

## 2024-05-01 RX ORDER — ATORVASTATIN CALCIUM 20 MG/1
10 TABLET, FILM COATED ORAL DAILY
Status: DISCONTINUED | OUTPATIENT
Start: 2024-05-01 | End: 2024-05-03 | Stop reason: HOSPADM

## 2024-05-01 RX ORDER — SODIUM CHLORIDE 0.9 % (FLUSH) 0.9 %
10 SYRINGE (ML) INJECTION AS NEEDED
Status: DISCONTINUED | OUTPATIENT
Start: 2024-05-01 | End: 2024-05-03 | Stop reason: HOSPADM

## 2024-05-01 RX ORDER — SODIUM CHLORIDE 9 MG/ML
40 INJECTION, SOLUTION INTRAVENOUS AS NEEDED
Status: DISCONTINUED | OUTPATIENT
Start: 2024-05-01 | End: 2024-05-03 | Stop reason: HOSPADM

## 2024-05-01 RX ORDER — OXYCODONE HYDROCHLORIDE AND ACETAMINOPHEN 5; 325 MG/1; MG/1
1 TABLET ORAL EVERY 4 HOURS PRN
Status: DISCONTINUED | OUTPATIENT
Start: 2024-05-01 | End: 2024-05-01

## 2024-05-01 RX ORDER — INSULIN LISPRO 100 [IU]/ML
2-9 INJECTION, SOLUTION INTRAVENOUS; SUBCUTANEOUS
Status: DISCONTINUED | OUTPATIENT
Start: 2024-05-01 | End: 2024-05-03 | Stop reason: HOSPADM

## 2024-05-01 RX ORDER — OXYCODONE HYDROCHLORIDE AND ACETAMINOPHEN 5; 325 MG/1; MG/1
1 TABLET ORAL EVERY 4 HOURS PRN
Status: DISCONTINUED | OUTPATIENT
Start: 2024-05-01 | End: 2024-05-03 | Stop reason: HOSPADM

## 2024-05-01 RX ORDER — ONDANSETRON 4 MG/1
4 TABLET, ORALLY DISINTEGRATING ORAL EVERY 6 HOURS PRN
Status: DISCONTINUED | OUTPATIENT
Start: 2024-05-01 | End: 2024-05-03 | Stop reason: HOSPADM

## 2024-05-01 RX ORDER — AMOXICILLIN 250 MG
2 CAPSULE ORAL 2 TIMES DAILY PRN
Status: DISCONTINUED | OUTPATIENT
Start: 2024-05-01 | End: 2024-05-03 | Stop reason: HOSPADM

## 2024-05-01 RX ORDER — DEXTROSE MONOHYDRATE 25 G/50ML
25 INJECTION, SOLUTION INTRAVENOUS
Status: DISCONTINUED | OUTPATIENT
Start: 2024-05-01 | End: 2024-05-03 | Stop reason: HOSPADM

## 2024-05-01 RX ORDER — MIDAZOLAM HYDROCHLORIDE 1 MG/ML
1 INJECTION INTRAMUSCULAR; INTRAVENOUS ONCE
Status: DISCONTINUED | OUTPATIENT
Start: 2024-05-01 | End: 2024-05-01

## 2024-05-01 RX ORDER — SODIUM CHLORIDE 0.9 % (FLUSH) 0.9 %
10 SYRINGE (ML) INJECTION EVERY 12 HOURS SCHEDULED
Status: DISCONTINUED | OUTPATIENT
Start: 2024-05-01 | End: 2024-05-03 | Stop reason: HOSPADM

## 2024-05-01 RX ORDER — BISACODYL 10 MG
10 SUPPOSITORY, RECTAL RECTAL DAILY PRN
Status: DISCONTINUED | OUTPATIENT
Start: 2024-05-01 | End: 2024-05-03 | Stop reason: HOSPADM

## 2024-05-01 RX ORDER — ACETAMINOPHEN 650 MG/1
650 SUPPOSITORY RECTAL EVERY 4 HOURS PRN
Status: DISCONTINUED | OUTPATIENT
Start: 2024-05-01 | End: 2024-05-03 | Stop reason: HOSPADM

## 2024-05-01 RX ORDER — IBUPROFEN 600 MG/1
1 TABLET ORAL
Status: DISCONTINUED | OUTPATIENT
Start: 2024-05-01 | End: 2024-05-03 | Stop reason: HOSPADM

## 2024-05-01 RX ORDER — IPRATROPIUM BROMIDE AND ALBUTEROL SULFATE 2.5; .5 MG/3ML; MG/3ML
3 SOLUTION RESPIRATORY (INHALATION) EVERY 4 HOURS PRN
Status: DISCONTINUED | OUTPATIENT
Start: 2024-05-01 | End: 2024-05-03 | Stop reason: HOSPADM

## 2024-05-01 RX ORDER — ONDANSETRON 2 MG/ML
4 INJECTION INTRAMUSCULAR; INTRAVENOUS EVERY 6 HOURS PRN
Status: DISCONTINUED | OUTPATIENT
Start: 2024-05-01 | End: 2024-05-03 | Stop reason: HOSPADM

## 2024-05-01 RX ORDER — POLYETHYLENE GLYCOL 3350 17 G/17G
17 POWDER, FOR SOLUTION ORAL DAILY PRN
Status: DISCONTINUED | OUTPATIENT
Start: 2024-05-01 | End: 2024-05-03 | Stop reason: HOSPADM

## 2024-05-01 RX ORDER — ACETAMINOPHEN 325 MG/1
650 TABLET ORAL EVERY 6 HOURS PRN
Status: DISCONTINUED | OUTPATIENT
Start: 2024-05-01 | End: 2024-05-03 | Stop reason: HOSPADM

## 2024-05-01 RX ADMIN — METOPROLOL TARTRATE 25 MG: 25 TABLET, FILM COATED ORAL at 09:07

## 2024-05-01 RX ADMIN — OXYCODONE AND ACETAMINOPHEN 1 TABLET: 5; 325 TABLET ORAL at 11:56

## 2024-05-01 RX ADMIN — GABAPENTIN 600 MG: 300 CAPSULE ORAL at 16:04

## 2024-05-01 RX ADMIN — LORAZEPAM 1 MG: 1 TABLET ORAL at 06:46

## 2024-05-01 RX ADMIN — ATORVASTATIN CALCIUM 10 MG: 20 TABLET, FILM COATED ORAL at 09:07

## 2024-05-01 RX ADMIN — ACYCLOVIR 400 MG: 400 TABLET ORAL at 10:26

## 2024-05-01 RX ADMIN — METOPROLOL TARTRATE 25 MG: 25 TABLET, FILM COATED ORAL at 21:49

## 2024-05-01 RX ADMIN — INSULIN LISPRO 2 UNITS: 100 INJECTION, SOLUTION INTRAVENOUS; SUBCUTANEOUS at 17:59

## 2024-05-01 RX ADMIN — GABAPENTIN 600 MG: 300 CAPSULE ORAL at 21:49

## 2024-05-01 RX ADMIN — CEFEPIME 2000 MG: 2 INJECTION, POWDER, FOR SOLUTION INTRAVENOUS at 09:07

## 2024-05-01 RX ADMIN — VANCOMYCIN HYDROCHLORIDE 1750 MG: 10 INJECTION, POWDER, LYOPHILIZED, FOR SOLUTION INTRAVENOUS at 00:40

## 2024-05-01 RX ADMIN — GABAPENTIN 600 MG: 300 CAPSULE ORAL at 09:07

## 2024-05-01 RX ADMIN — CEFEPIME 2000 MG: 2 INJECTION, POWDER, FOR SOLUTION INTRAVENOUS at 17:59

## 2024-05-01 RX ADMIN — Medication 10 ML: at 09:07

## 2024-05-01 RX ADMIN — Medication 10 ML: at 00:40

## 2024-05-01 NOTE — PROGRESS NOTES
"[facility] Clinical Pharmacy Services: Vancomycin Monitoring Note    Yenny Crawford is a 58 y.o. female who is on day 2/5 of pharmacy to dose vancomycin for Skin and Soft Tissue.    Previous Vancomycin Dose:   1250 mg IV every  12  hours  Updated Cultures and Sensitivities: 5/1 WD cx pending      Vitals/Labs  Ht: 172.7 cm (68\"); Wt: 101 kg (221 lb 9 oz)   Temp Readings from Last 1 Encounters:   05/01/24 98.4 °F (36.9 °C) (Oral)     Estimated Creatinine Clearance: 129.1 mL/min (by C-G formula based on SCr of 0.59 mg/dL).        Results from last 7 days   Lab Units 05/01/24  0459 04/30/24  2255   CREATININE mg/dL 0.59 0.71   WBC 10*3/mm3 8.62 8.39     Assessment/Plan    Current Vancomycin Dose: 1250 mg IV every  12  hours; provides a predicted  mg/L.hr   Next Level Date and Time: Vanc Trough on 5/2 at 1130  We will continue to monitor patient changes and renal function     Thank you for involving pharmacy in this patient's care. Please contact pharmacy with any questions or concerns.       Chelle Adams, [facility]  Clinical Pharmacist          "

## 2024-05-01 NOTE — ED NOTES
Nursing report ED to floor  Yenny Crawford  58 y.o.  female    HPI :  HPI (Adult)  Stated Reason for Visit: toe pain    Chief Complaint  Chief Complaint   Patient presents with    Wound Check    Toe Pain     Yenny Crawford is a 58 y.o. female with a medical history of COPD, CAD, hypertension, type 2 diabetes, peripheral neuropathy who presents emergency department with an open wound to her right foot.  Patient says that she believes that she broke her toe at the beginning of April.  She says she does not remember any specific trauma or injury but says that she saw her podiatrist for routine follow-up on 416 they shot an x-ray and found her toe to be dislocated/fractured.  She says that she wrapped the foot and the patient went for follow-up today and was started on Keflex.  The patient says since the toe has been broken she has been cam taping it to the toe next to it and says the skin has now eroded and she has an open wound and is concerned there is exposed bone.  Patient denies fevers or chills.  She has already had a prior amputation to her right great toe.  She says her diabetes has been well-controlled and blood glucose has been in the 100s.  She denies any significant pain to the toe but does have peripheral neuropathy.     Admitting doctor:   Uriel Dodge MD    Admitting diagnosis:   The encounter diagnosis was Toe infection.    Code status:   Current Code Status       Date Active Code Status Order ID Comments User Context       5/1/2024 0034 CPR (Attempt to Resuscitate) [id number]  Rita Watts APRN ED        Question Answer    Code Status (Patient has no pulse and is not breathing) CPR (Attempt to Resuscitate)    Medical Interventions (Patient has pulse or is breathing) Full Support                    Allergies:   Latex    Isolation:   No active isolations    Intake and Output  No intake or output data in the 24 hours ending 05/01/24 0121    Weight:       04/30/24  2216   Weight: 90.7 kg  (200 lb)       Most recent vitals:   Vitals:    04/30/24 2218 04/30/24 2301 04/30/24 2331 05/01/24 0039   BP: 126/90 106/73 121/86 133/75   BP Location: Right arm      Patient Position: Standing      Pulse:       Resp:       Temp:       TempSrc:       SpO2:       Weight:       Height:           Active LDAs/IV Access:   Lines, Drains & Airways       Active LDAs       Name Placement date Placement time Site Days    Peripheral IV 04/30/24 2231 Anterior;Right Forearm 04/30/24 2231  Forearm  less than 1                    Labs (abnormal labs have a star):   Labs Reviewed   COMPREHENSIVE METABOLIC PANEL - Abnormal; Notable for the following components:       Result Value    Glucose 123 (*)     CO2 32.2 (*)     Anion Gap 4.8 (*)     All other components within normal limits    Narrative:     GFR Normal >60  Chronic Kidney Disease <60  Kidney Failure <15     C-REACTIVE PROTEIN - Abnormal; Notable for the following components:    C-Reactive Protein 4.34 (*)     All other components within normal limits   CBC WITH AUTO DIFFERENTIAL - Abnormal; Notable for the following components:    Lymphocytes, Absolute 3.29 (*)     All other components within normal limits   PROTIME-INR - Normal   APTT - Normal   SEDIMENTATION RATE - Normal   LACTIC ACID, PLASMA - Normal   BASIC METABOLIC PANEL   CBC (NO DIFF)   POCT GLUCOSE FINGERSTICK   POCT GLUCOSE FINGERSTICK   POCT GLUCOSE FINGERSTICK   POCT GLUCOSE FINGERSTICK   CBC AND DIFFERENTIAL    Narrative:     The following orders were created for panel order CBC & Differential.  Procedure                               Abnormality         Status                     ---------                               -----------         ------                     CBC Auto Differential[[id number]]        Abnormal            Final result                 Please view results for these tests on the individual orders.       EKG:   No orders to display       Meds given in ED:   Medications   vancomycin 1750  mg/500 mL 0.9% NS IVPB ([facility]) (1,750 mg Intravenous New Bag 5/1/24 0040)   sodium chloride 0.9 % flush 10 mL (10 mL Intravenous Given 5/1/24 0040)   sodium chloride 0.9 % flush 10 mL (has no administration in time range)   sodium chloride 0.9 % infusion 40 mL (has no administration in time range)   dextrose (GLUTOSE) oral gel 15 g (has no administration in time range)   dextrose (D50W) (25 g/50 mL) IV injection 25 g (has no administration in time range)   glucagon (GLUCAGEN) injection 1 mg (has no administration in time range)   insulin lispro (HUMALOG/ADMELOG) injection 2-9 Units (has no administration in time range)   nitroglycerin (NITROSTAT) SL tablet 0.4 mg (has no administration in time range)   sennosides-docusate (PERICOLACE) 8.6-50 MG per tablet 2 tablet (has no administration in time range)     And   polyethylene glycol (MIRALAX) packet 17 g (has no administration in time range)     And   bisacodyl (DULCOLAX) EC tablet 5 mg (has no administration in time range)     And   bisacodyl (DULCOLAX) suppository 10 mg (has no administration in time range)   acetaminophen (TYLENOL) tablet 650 mg (has no administration in time range)     Or   acetaminophen (TYLENOL) 160 MG/5ML oral solution 650 mg (has no administration in time range)     Or   acetaminophen (TYLENOL) suppository 650 mg (has no administration in time range)   ondansetron ODT (ZOFRAN-ODT) disintegrating tablet 4 mg (has no administration in time range)     Or   ondansetron (ZOFRAN) injection 4 mg (has no administration in time range)   cefepime 2000 mg IVPB in 100 mL NS (MBP) (0 mg Intravenous Stopped 5/1/24 0039)       Imaging results:  XR Foot 3+ View Right    Result Date: 4/30/2024  Patient is status post amputation through the base of the proximal phalanx of the great toe. No aggressive osseous abnormalities are seen, although MRI would be more sensitive.  This report was finalized on 4/30/2024 11:21 PM by Dr. Sirisha Castellanos M.D on Workstation:  [id number]       Ambulatory status:   - Standby     Social issues:   Social History     Socioeconomic History    Marital status:    Tobacco Use    Smoking status: Every Day     Current packs/day: 0.50     Average packs/day: 0.5 packs/day for 45.0 years (22.5 ttl pk-yrs)     Types: Cigarettes    Smokeless tobacco: Never   Vaping Use    Vaping status: Former   Substance and Sexual Activity    Alcohol use: Yes     Comment: SOCIALLY    Drug use: Never    Sexual activity: Defer       Peripheral Neurovascular  Peripheral Neurovascular (Adult)  Peripheral Neurovascular WDL: .WDL except, neurovascular assessment lower, pulse assessment  Pulse Assessment: dorsalis pedis, popliteal  LLE Neurovascular Assessment  Temperature LLE: warm  Color LLE: no discoloration  Sensation LLE: no numbness, no tenderness, no tingling  RLE Neurovascular Assessment  Temperature RLE: hot  Color RLE: red  Sensation RLE: tenderness present, no numbness, no tingling    Neuro Cognitive  Neuro Cognitive (Adult)  Cognitive/Neuro/Behavioral WDL: WDL    Learning  Learning Assessment (Adult)  Learning Readiness and Ability: no barriers identified    Respiratory  Respiratory WDL  Respiratory WDL: WDL    Abdominal Pain       Pain Assessments  Pain (Adult)  (0-10) Pain Rating: Rest: 10    NIH Stroke Scale       Susan Moura RN  05/01/24 01:21 EDT     Call Susan #[phone] with any questions

## 2024-05-01 NOTE — ED PROVIDER NOTES
EMERGENCY DEPARTMENT ENCOUNTER  Room Number:  08/08  PCP: Kassy Antonio APRN  Independent Historians: Patient      HPI:  Chief Complaint: had concerns including Wound Check and Toe Pain.     A complete HPI/ROS/PMH/PSH/SH/FH are unobtainable due to: None    Chronic or social conditions impacting patient care (Social Determinants of Health): None      Context: Yenny Crawford is a 58 y.o. female with a medical history of COPD, CAD, hypertension, type 2 diabetes, peripheral neuropathy who presents emergency department with an open wound to her right foot.  Patient says that she believes that she broke her toe at the beginning of April.  She says she does not remember any specific trauma or injury but says that she saw her podiatrist for routine follow-up on 416 they shot an x-ray and found her toe to be dislocated/fractured.  She says that she wrapped the foot and the patient went for follow-up today and was started on Keflex.  The patient says since the toe has been broken she has been cam taping it to the toe next to it and says the skin has now eroded and she has an open wound and is concerned there is exposed bone.  Patient denies fevers or chills.  She has already had a prior amputation to her right great toe.  She says her diabetes has been well-controlled and blood glucose has been in the 100s.  She denies any significant pain to the toe but does have peripheral neuropathy.      Review of prior external notes (non-ED) -and- Review of prior external test results outside of this encounter:   A1c from 9/29/2023 was 5.8    I reviewed labs from 10/3/2023, hemoglobin 11.5, creatinine 0.85      PAST MEDICAL HISTORY  Active Ambulatory Problems     Diagnosis Date Noted    Status post knee replacement 10/02/2023    COPD (chronic obstructive pulmonary disease) 10/03/2023    CAD (coronary artery disease) 10/03/2023    Tobacco abuse 10/03/2023    HTN (hypertension) 10/03/2023    Type 2 diabetes mellitus with  hyperglycemia, without long-term current use of insulin 10/03/2023    Peripheral neuropathy 10/03/2023    Chronic back pain 10/03/2023     Resolved Ambulatory Problems     Diagnosis Date Noted    No Resolved Ambulatory Problems     Past Medical History:   Diagnosis Date    Arthritis     Asthma     Degenerative disc disease, lumbar     Diabetes mellitus     GERD (gastroesophageal reflux disease)     Hyperlipidemia     Hypertension     Neuropathy     Osteoarthritis     Positive colorectal cancer screening using Cologuard test     PVD (peripheral vascular disease)     Seasonal allergies     Sleep apnea          PAST SURGICAL HISTORY  Past Surgical History:   Procedure Laterality Date    APPENDECTOMY      ECTOPIC PREGNANCY      JOINT REPLACEMENT Bilateral     KNEE ARTHROPLASTY    TOTAL KNEE ARTHROPLASTY REVISION Left 10/2/2023    Procedure: TOTAL KNEE ARTHROPLASTY REVISION WITH CORI ROBOT;  Surgeon: Luis Ansari II, MD;  Location: [facility] OR [facility] [place];  Service: Robotics - Ortho;  Laterality: Left;    WISDOM TOOTH EXTRACTION           FAMILY HISTORY  Family History   Problem Relation Age of Onset    Malig Hyperthermia Neg Hx          SOCIAL HISTORY  Social History     Socioeconomic History    Marital status:    Tobacco Use    Smoking status: Every Day     Current packs/day: 0.50     Average packs/day: 0.5 packs/day for 45.0 years (22.5 ttl pk-yrs)     Types: Cigarettes    Smokeless tobacco: Never   Vaping Use    Vaping status: Former   Substance and Sexual Activity    Alcohol use: Yes     Comment: SOCIALLY    Drug use: Never    Sexual activity: Defer         ALLERGIES  Latex      REVIEW OF SYSTEMS  Included in HPI  All systems reviewed and negative except for those discussed in HPI.      PHYSICAL EXAM    I have reviewed the triage vital signs and nursing notes.    ED Triage Vitals   Temp Heart Rate Resp BP SpO2   04/30/24 2216 04/30/24 2216 04/30/24 2216 04/30/24 2218 04/30/24 2216   97 °F (36.1 °C) 84 18  126/90 98 %      Temp src Heart Rate Source Patient Position BP Location FiO2 (%)   04/30/24 2216 04/30/24 2216 04/30/24 2218 04/30/24 2218 --   Tympanic Monitor Standing Right arm        Physical Exam  Constitutional:       General: She is not in acute distress.     Appearance: Normal appearance. She is obese.   HENT:      Head: Normocephalic and atraumatic.      Nose: Nose normal.      Mouth/Throat:      Mouth: Mucous membranes are moist.   Eyes:      Extraocular Movements: Extraocular movements intact.      Pupils: Pupils are equal, round, and reactive to light.   Cardiovascular:      Rate and Rhythm: Normal rate and regular rhythm.      Pulses: Normal pulses.      Heart sounds: Normal heart sounds.   Pulmonary:      Effort: Pulmonary effort is normal. No respiratory distress.      Breath sounds: Normal breath sounds.   Abdominal:      General: Abdomen is flat. There is no distension.      Palpations: Abdomen is soft.      Tenderness: There is no abdominal tenderness.   Musculoskeletal:         General: Normal range of motion.      Cervical back: Normal range of motion and neck supple.   Skin:     General: Skin is warm and dry.      Capillary Refill: Capillary refill takes less than 2 seconds.   Neurological:      General: No focal deficit present.      Mental Status: She is alert and oriented to person, place, and time.   Psychiatric:         Mood and Affect: Mood normal.         Behavior: Behavior normal.                 LAB RESULTS  Recent Results (from the past 24 hour(s))   Comprehensive Metabolic Panel    Collection Time: 04/30/24 10:55 PM    Specimen: Blood   Result Value Ref Range    Glucose 123 (H) 65 - 99 mg/dL    BUN 16 6 - 20 mg/dL    Creatinine 0.71 0.57 - 1.00 mg/dL    Sodium 140 136 - 145 mmol/L    Potassium 4.5 3.5 - 5.2 mmol/L    Chloride 103 98 - 107 mmol/L    CO2 32.2 (H) 22.0 - 29.0 mmol/L    Calcium 9.6 8.6 - 10.5 mg/dL    Total Protein 6.7 6.0 - 8.5 g/dL    Albumin 3.9 3.5 - 5.2 g/dL    ALT  (SGPT) 10 1 - 33 U/L    AST (SGOT) 14 1 - 32 U/L    Alkaline Phosphatase 104 39 - 117 U/L    Total Bilirubin <0.2 0.0 - 1.2 mg/dL    Globulin 2.8 gm/dL    A/G Ratio 1.4 g/dL    BUN/Creatinine Ratio 22.5 7.0 - 25.0    Anion Gap 4.8 (L) 5.0 - 15.0 mmol/L    eGFR 98.7 >60.0 mL/min/1.73   Protime-INR    Collection Time: 04/30/24 10:55 PM    Specimen: Blood   Result Value Ref Range    Protime 13.7 11.7 - 14.2 Seconds    INR 1.03 0.90 - 1.10   aPTT    Collection Time: 04/30/24 10:55 PM    Specimen: Blood   Result Value Ref Range    PTT 24.2 22.7 - 35.4 seconds   Sedimentation Rate    Collection Time: 04/30/24 10:55 PM    Specimen: Blood   Result Value Ref Range    Sed Rate 25 0 - 30 mm/hr   C-reactive Protein    Collection Time: 04/30/24 10:55 PM    Specimen: Blood   Result Value Ref Range    C-Reactive Protein 4.34 (H) 0.00 - 0.50 mg/dL   Lactic Acid, Plasma    Collection Time: 04/30/24 10:55 PM    Specimen: Blood   Result Value Ref Range    Lactate 1.1 0.5 - 2.0 mmol/L   CBC Auto Differential    Collection Time: 04/30/24 10:55 PM    Specimen: Blood   Result Value Ref Range    WBC 8.39 3.40 - 10.80 10*3/mm3    RBC 4.24 3.77 - 5.28 10*6/mm3    Hemoglobin 12.1 12.0 - 15.9 g/dL    Hematocrit 36.9 34.0 - 46.6 %    MCV 87.0 79.0 - 97.0 fL    MCH 28.5 26.6 - 33.0 pg    MCHC 32.8 31.5 - 35.7 g/dL    RDW 13.3 12.3 - 15.4 %    RDW-SD 42.2 37.0 - 54.0 fl    MPV 9.5 6.0 - 12.0 fL    Platelets 235 140 - 450 10*3/mm3    Neutrophil % 46.6 42.7 - 76.0 %    Lymphocyte % 39.2 19.6 - 45.3 %    Monocyte % 10.0 5.0 - 12.0 %    Eosinophil % 3.1 0.3 - 6.2 %    Basophil % 0.6 0.0 - 1.5 %    Immature Grans % 0.5 0.0 - 0.5 %    Neutrophils, Absolute 3.91 1.70 - 7.00 10*3/mm3    Lymphocytes, Absolute 3.29 (H) 0.70 - 3.10 10*3/mm3    Monocytes, Absolute 0.84 0.10 - 0.90 10*3/mm3    Eosinophils, Absolute 0.26 0.00 - 0.40 10*3/mm3    Basophils, Absolute 0.05 0.00 - 0.20 10*3/mm3    Immature Grans, Absolute 0.04 0.00 - 0.05 10*3/mm3    nRBC 0.0 0.0 -  0.2 /100 WBC         RADIOLOGY  XR Foot 3+ View Right    Result Date: 4/30/2024  3 VIEWS RIGHT FOOT  HISTORY: Right foot pain  COMPARISON: None available.  FINDINGS: The patient is status post amputation at the proximal phalanx of the great toe. There is subluxation which is noted at the proximal interphalangeal joint of the second toe. No aggressive osseous abnormalities are seen. There is calcaneal spurring. Patient appears to have soft tissue swelling about the forefoot. No subcutaneous gas or retained foreign body is seen.      Patient is status post amputation through the base of the proximal phalanx of the great toe. No aggressive osseous abnormalities are seen, although MRI would be more sensitive.  This report was finalized on 4/30/2024 11:21 PM by Dr. Sirisha Castellanos M.D on Workstation: [id number]         MEDICATIONS GIVEN IN ER  Medications   vancomycin 1750 mg/500 mL 0.9% NS IVPB ([facility]) (1,750 mg Intravenous New Bag 5/1/24 0040)   sodium chloride 0.9 % flush 10 mL (10 mL Intravenous Given 5/1/24 0040)   sodium chloride 0.9 % flush 10 mL (has no administration in time range)   sodium chloride 0.9 % infusion 40 mL (has no administration in time range)   dextrose (GLUTOSE) oral gel 15 g (has no administration in time range)   dextrose (D50W) (25 g/50 mL) IV injection 25 g (has no administration in time range)   glucagon (GLUCAGEN) injection 1 mg (has no administration in time range)   insulin lispro (HUMALOG/ADMELOG) injection 2-9 Units (has no administration in time range)   nitroglycerin (NITROSTAT) SL tablet 0.4 mg (has no administration in time range)   sennosides-docusate (PERICOLACE) 8.6-50 MG per tablet 2 tablet (has no administration in time range)     And   polyethylene glycol (MIRALAX) packet 17 g (has no administration in time range)     And   bisacodyl (DULCOLAX) EC tablet 5 mg (has no administration in time range)     And   bisacodyl (DULCOLAX) suppository 10 mg (has no administration in time  range)   acetaminophen (TYLENOL) tablet 650 mg (has no administration in time range)     Or   acetaminophen (TYLENOL) 160 MG/5ML oral solution 650 mg (has no administration in time range)     Or   acetaminophen (TYLENOL) suppository 650 mg (has no administration in time range)   ondansetron ODT (ZOFRAN-ODT) disintegrating tablet 4 mg (has no administration in time range)     Or   ondansetron (ZOFRAN) injection 4 mg (has no administration in time range)   midazolam (VERSED) injection 1 mg (has no administration in time range)   cefepime 2000 mg IVPB in 100 mL NS (MBP) (has no administration in time range)   cefepime 2000 mg IVPB in 100 mL NS (MBP) (0 mg Intravenous Stopped 5/1/24 0039)           OUTPATIENT MEDICATION MANAGEMENT:  Current Facility-Administered Medications Ordered in Epic   Medication Dose Route Frequency Provider Last Rate Last Admin    acetaminophen (TYLENOL) tablet 650 mg  650 mg Oral Q4H PRN Rita Watts APRN        Or    acetaminophen (TYLENOL) 160 MG/5ML oral solution 650 mg  650 mg Oral Q4H PRN Rita Watts APRN        Or    acetaminophen (TYLENOL) suppository 650 mg  650 mg Rectal Q4H PRN Rita Watts APRN        sennosides-docusate (PERICOLACE) 8.6-50 MG per tablet 2 tablet  2 tablet Oral BID PRN Rita Watts APRN        And    polyethylene glycol (MIRALAX) packet 17 g  17 g Oral Daily PRN Rita Watts APRN        And    bisacodyl (DULCOLAX) EC tablet 5 mg  5 mg Oral Daily PRN Rita Watts APRN        And    bisacodyl (DULCOLAX) suppository 10 mg  10 mg Rectal Daily PRN Rita Watts APRN        cefepime 2000 mg IVPB in 100 mL NS (MBP)  2,000 mg Intravenous Q24H Rita Watts APRN        dextrose (D50W) (25 g/50 mL) IV injection 25 g  25 g Intravenous Q15 Min PRN Rita Watts APRN        dextrose (GLUTOSE) oral gel 15 g  15 g Oral Q15 Min PRN Rita Watts APRN        glucagon (GLUCAGEN)  injection 1 mg  1 mg Intramuscular Q15 Min PRN Rita Watts APRN        insulin lispro (HUMALOG/ADMELOG) injection 2-9 Units  2-9 Units Subcutaneous 4x Daily AC & at Bedtime Rita Watts APRN        midazolam (VERSED) injection 1 mg  1 mg Intravenous Once Rita Watts APRN        nitroglycerin (NITROSTAT) SL tablet 0.4 mg  0.4 mg Sublingual Q5 Min PRN Rita Watts APRN        ondansetron ODT (ZOFRAN-ODT) disintegrating tablet 4 mg  4 mg Oral Q6H PRN Rita Watts APRN        Or    ondansetron (ZOFRAN) injection 4 mg  4 mg Intravenous Q6H PRN Rita Watts APRN        sodium chloride 0.9 % flush 10 mL  10 mL Intravenous Q12H Rita Watts APRN   10 mL at 05/01/24 0040    sodium chloride 0.9 % flush 10 mL  10 mL Intravenous PRN Rita Watts APRN        sodium chloride 0.9 % infusion 40 mL  40 mL Intravenous PRN Rita Watts APRN        vancomycin 1750 mg/500 mL 0.9% NS IVPB ([facility])  20 mg/kg Intravenous Once Norma Olvera PA-C   1,750 mg at 05/01/24 0040     Current Outpatient Medications Ordered in Epic   Medication Sig Dispense Refill    acyclovir (ZOVIRAX) 400 MG tablet Take 1 tablet by mouth Daily As Needed.      albuterol sulfate  (90 Base) MCG/ACT inhaler Inhale 2 puffs Every 4 (Four) Hours As Needed.      budesonide-formoterol (SYMBICORT) 160-4.5 MCG/ACT inhaler Inhale 2 puffs 2 (Two) Times a Day. 10.2 g 12    CHLORHEXIDINE GLUCONATE CLOTH EX Apply  topically. USE AS DIRECTED      Cholecalciferol 50 MCG (2000 UT) tablet Take 1 tablet by mouth Daily. HOLDING FOR DOS      Cyanocobalamin 1000 MCG/ML kit Inject 1 mL into the appropriate muscle as directed by prescriber Every 30 (Thirty) Days.      docusate sodium 100 MG capsule Take 1 capsule by mouth 2 (Two) Times a Day.      gabapentin (NEURONTIN) 600 MG tablet Take 1 tablet by mouth 3 (Three) Times a Day.      Jardiance 10 MG tablet tablet Take 1 tablet by  mouth Daily. Indications: Type 2 Diabetes      loratadine (CLARITIN) 10 MG tablet Take 1 tablet by mouth Daily.      metFORMIN (GLUCOPHAGE) 850 MG tablet Take 1 tablet by mouth 2 (Two) Times a Day With Meals. NOT CURRENTLY TAKING  Indications: Type 2 Diabetes      metoprolol tartrate (LOPRESSOR) 25 MG tablet Take 1 tablet by mouth 2 (Two) Times a Day.      nitroglycerin (NITROSTAT) 0.4 MG SL tablet Place 1 tablet under the tongue Every 5 (Five) Minutes As Needed.      Omega-3 Fatty Acids (fish oil) 1200 MG capsule capsule Take 1 capsule by mouth Daily With Breakfast. HOLDING FOR DOS      ondansetron (Zofran) 4 MG tablet Take 1 tablet by mouth Every 6 (Six) Hours As Needed for Nausea or Vomiting. 30 tablet 0    oxyCODONE-acetaminophen (PERCOCET) 5-325 MG per tablet Take 1 tablet by mouth Every 4 (Four) Hours As Needed for Severe Pain. 50 tablet 0    simvastatin (ZOCOR) 10 MG tablet Take 1 tablet by mouth Every Night.      tiotropium bromide monohydrate (SPIRIVA RESPIMAT) 2.5 MCG/ACT aerosol solution inhaler Inhale 2 puffs Daily. 4 g 3    TiZANidine (ZANAFLEX) 2 MG capsule Take 1 capsule by mouth 3 (Three) Times a Day.             PROGRESS, DATA ANALYSIS, CONSULTS, AND MEDICAL DECISION MAKING  ORDERS PLACED DURING THIS VISIT:  Orders Placed This Encounter   Procedures    XR Foot 3+ View Right    MRI Foot Right Without Contrast    Comprehensive Metabolic Panel    Protime-INR    aPTT    Sedimentation Rate    C-reactive Protein    Lactic Acid, Plasma    CBC Auto Differential    Basic Metabolic Panel    CBC (No Diff)    Diet: Cardiac, Diabetic; Healthy Heart (2-3 Na+); Consistent Carbohydrate; Fluid Consistency: Thin (IDDSI 0)    Vital Signs    Intake & Output    Weigh Patient    Oral Care    Place Sequential Compression Device    Maintain Sequential Compression Device    Maintain IV Access    Telemetry - Place Orders & Notify Provider of Results When Patient Experiences Acute Chest Pain, Dysrhythmia or Respiratory  Distress    May Be Off Telemetry for Tests    Continuous Pulse Oximetry    Up With Assistance    Daily Weights    Neurovascular Checks    Code Status and Medical Interventions:    [facility] (on-call MD unless specified) Details    Inpatient Podiatry Consult    PT Consult: Eval & Treat As Tolerated; Discharge Placement Assessment    Incentive Spirometry    Oxygen Therapy- Nasal Cannula; Titrate 1-6 LPM Per SpO2; 90 - 95%    POC Glucose 4x Daily Before Meals & at Bedtime    Wound Ostomy Eval & Treat    Insert Peripheral IV    Initiate Observation Status    CBC & Differential       All labs have been independently interpreted by me.  All radiology studies have been reviewed by me. All EKG's have been independently viewed and interpreted by me.  Discussion below represents my analysis of pertinent findings related to patient's condition, differential diagnosis, treatment plan and final disposition.    Differential diagnosis includes but is not limited to:   My diagnosis for lower extremity pain and injury includes but is not limited to hip fracture, femur fracture, hip dislocation, hip contusion, hip sprain, hip strain, pelvic fracture, ischio-tibial band pain, ischio-tibial band bursitis, knee sprain, patella dislocation, knee dislocation, internal derangement of knee, fractures of the femur, tibia, fibula, ankle, foot and digits, ankle sprain, ankle dislocation, Lisfranc fracture, fracture dislocations of the digits, pulmonary embolism, claudication, peripheral vascular disease, gout, osteoarthritis, rheumatoid arthritis, bursitis, septic joint, poly-rheumatica, polyarthralgia and other inflammatory or infectious disease processes       ED Course:  ED Course as of 05/01/24 0124   Tue Apr 30, 2024   2251 I discussed the case with Dr. Hansen and they agree to evaluate the patient at the bedside.    [CC]   2323 C-Reactive Protein(!): 4.34 [CC]   2323 Lactate: 1.1 [CC]   2323 WBC: 8.39 [CC]   Wed May 01, 2024   0004 Spoke with  Antoinette, DARELL with [facility].  Reviewed history, exam, results, treatments.  She agrees admit the patient to Dr. Arguello.      [CC]      ED Course User Index  [CC] Norma Olvera PA-C           AS OF 01:24 EDT VITALS:    BP - 133/75  HR - 84  TEMP - 97 °F (36.1 °C) (Tympanic)  O2 SATS - 98%      MDM:  Patient is a 58-year-old female with a history of diabetes and peripheral neuropathy presents emergency department with a wound between the second and third toes on the right foot.  On arrival here in the emergency department vitals are reassuring, she is afebrile.  On my exam the patient has macerated and ulcerated skin between the second and third toes on the right foot where the patient has had the toes cam taped together over the last few weeks.  Patient has an obvious subluxation of the second toe on the right foot which has been ongoing since the beginning of April.  Given the severe angulation of the toe I suspect that it is causing pressure against the other toe causing skin breakdown and ulceration.  Patient has a history of diabetes and has had a prior amputation on this foot.  I have concern about this toe also needing amputation if it is not treated appropriately.  Labs are generally reassuring there is no obvious signs of osteomyelitis on x-ray today but I believe the patient would benefit from inpatient hospitalization, podiatry consultation, and IV antibiotics.  Patient is agreeable and is stable at time of admission.      COMPLEXITY OF CARE  The patient requires admission.        DIAGNOSIS  Final diagnoses:   Toe infection         DISPOSITION  ED Disposition       ED Disposition   Decision to Admit    Condition   --    Comment   Level of Care: Med/Surg [1]   Diagnosis: Toe infection [[id number]]   Admitting Physician: ELIZABETH ARGUELLO [[id number]]   Attending Physician: ELIZABETH ARGUELLO [[id number]]                        Please note that portions of this document were completed with a voice recognition  program.    Note Disclaimer: At [facility], we believe that sharing information builds trust and better relationships. You are receiving this note because you recently visited [facility]. It is possible you will see health information before a provider has talked with you about it. This kind of information can be easy to misunderstand. To help you fully understand what it means for your health, we urge you to discuss this note with your provider.     Norma Olvera PA-C  05/01/24 0139

## 2024-05-01 NOTE — H&P
Patient Name:  Yenny Miner  YOB: 1965  MRN:  [id number]  Admit Date:  4/30/2024  Patient Care Team:  Kassy Antonio APRN as PCP - General (Family Medicine)      Subjective   History Present Illness     Chief Complaint   Patient presents with    Wound Check    Toe Pain     History of Present Illness  Ty Nolasco is a 58 y o female smoker with history of T2DM, HTN, HLD, COPD, type 2 diabetes mellitus and great toe amputation secondary to osteomyelitis who presents to [facility] today with complaints of a broke toe and wound on her right foot. 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 She reports the tape was in place since until she seen podiatrist again today who noted she had some swelling  and a wound lateral side of her second toe and started her on Keflex. She  had osteomyelitis of great toe right foot previously that required amputation, so she was concerned that it may develop again so she presented to the emergency room for further evaluation, She denies any other acute distress chest pain, shortness of breath, patient's, lightheadedness, dizziness, fever, chills, cough, abdominal pain nausea, vomiting, diarrhea, constipation or  symptoms.    Upon exam she is awake, alert and oriented in no acute distress, her lungs are diminished bilaterally, her HR and rhythm are regular, normal with no clicks, rubs, gallops, or murmurs noted. Her abdomen is soft, round non tender, non distended, with audible sounds in all 4 quadrants. She has noted great toe amputation of right foot, second toe has note wound on medial side, with purulent drainage, her right foot is edematous, with erythema from toes up to near her ankle, and is warm to touch.     Evaluation in the emergency room revealed she hemodynamically stable blood pressure 132/80, heart rate 84, respiratory rate 18, oxygen saturation 98%  on room air, she is afebrile Tmax 97.0.  Since completing CRP at 4.34 sed rate, normal WBC count.  X-ray right foot shows no aggressive osseous abnormalities, calcanela spurring, soft tissue swelling of forefoot, no subcutaneous gas or retained body is seen,   She was initiated on vancomycin and cefepime.     Ty FOSS will be admitted for further evaluation and treatment of  wound/ cellulitis of 2nd toe of her right foot and other acute and chronic conditions as needed.     Review of Systems   Constitutional: Negative.  Negative for chills, fatigue and fever.   HENT: Negative.  Negative for congestion and trouble swallowing.    Eyes: Negative.  Negative for visual disturbance.   Respiratory: Negative.  Negative for cough, chest tightness and shortness of breath.    Cardiovascular: Negative.  Negative for chest pain, palpitations and leg swelling.   Gastrointestinal: Negative.  Negative for abdominal distention, abdominal pain, constipation, diarrhea, nausea and vomiting.   Endocrine: Negative.    Genitourinary: Negative.  Negative for difficulty urinating, dysuria and frequency.   Musculoskeletal:  Positive for gait problem. Negative for arthralgias.        Right toe injury    Skin:  Positive for color change and wound. Negative for rash.   Neurological: Negative.  Negative for dizziness and headaches.   Hematological: Negative.    Psychiatric/Behavioral: Negative.  Negative for confusion.    All other systems reviewed and are negative.       Personal History     Past Medical History:   Diagnosis Date    Arthritis     Asthma     COPD (chronic obstructive pulmonary disease)     Degenerative disc disease, lumbar     CHRONIC    Diabetes mellitus     GERD (gastroesophageal reflux disease)     Hyperlipidemia     Hypertension     Neuropathy     CONOR FEET    Osteoarthritis     LEFT KNEE    Positive colorectal cancer screening using Cologuard test     PVD (peripheral vascular disease)     Seasonal allergies     Sleep apnea      NO CURRENT DEVICE     Past Surgical History:   Procedure Laterality Date    APPENDECTOMY      ECTOPIC PREGNANCY      JOINT REPLACEMENT Bilateral     KNEE ARTHROPLASTY    TOTAL KNEE ARTHROPLASTY REVISION Left 10/2/2023    Procedure: TOTAL KNEE ARTHROPLASTY REVISION WITH CORI ROBOT;  Surgeon: Luis Ansari II, MD;  Location: [facility];  Service: Robotics - Ortho;  Laterality: Left;    WISDOM TOOTH EXTRACTION       Family History   Problem Relation Age of Onset    Malig Hyperthermia Neg Hx      Social History     Tobacco Use    Smoking status: Every Day     Current packs/day: 0.50     Average packs/day: 0.5 packs/day for 45.0 years (22.5 ttl pk-yrs)     Types: Cigarettes    Smokeless tobacco: Never   Vaping Use    Vaping status: Former   Substance Use Topics    Alcohol use: Yes     Comment: SOCIALLY    Drug use: Never     No current facility-administered medications on file prior to encounter.     Current Outpatient Medications on File Prior to Encounter   Medication Sig Dispense Refill    acyclovir (ZOVIRAX) 400 MG tablet Take 1 tablet by mouth Daily As Needed.      albuterol sulfate  (90 Base) MCG/ACT inhaler Inhale 2 puffs Every 4 (Four) Hours As Needed.      budesonide-formoterol (SYMBICORT) 160-4.5 MCG/ACT inhaler Inhale 2 puffs 2 (Two) Times a Day. 10.2 g 12    CHLORHEXIDINE GLUCONATE CLOTH EX Apply  topically. USE AS DIRECTED      Cholecalciferol 50 MCG (2000 UT) tablet Take 1 tablet by mouth Daily. HOLDING FOR DOS      Cyanocobalamin 1000 MCG/ML kit Inject 1 mL into the appropriate muscle as directed by prescriber Every 30 (Thirty) Days.      docusate sodium 100 MG capsule Take 1 capsule by mouth 2 (Two) Times a Day.      gabapentin (NEURONTIN) 600 MG tablet Take 1 tablet by mouth 3 (Three) Times a Day.      Jardiance 10 MG tablet tablet Take 1 tablet by mouth Daily. Indications: Type 2 Diabetes      loratadine (CLARITIN) 10 MG tablet Take 1 tablet by mouth Daily.      metFORMIN  (GLUCOPHAGE) 850 MG tablet Take 1 tablet by mouth 2 (Two) Times a Day With Meals. NOT CURRENTLY TAKING  Indications: Type 2 Diabetes      metoprolol tartrate (LOPRESSOR) 25 MG tablet Take 1 tablet by mouth 2 (Two) Times a Day.      nitroglycerin (NITROSTAT) 0.4 MG SL tablet Place 1 tablet under the tongue Every 5 (Five) Minutes As Needed.      Omega-3 Fatty Acids (fish oil) 1200 MG capsule capsule Take 1 capsule by mouth Daily With Breakfast. HOLDING FOR DOS      ondansetron (Zofran) 4 MG tablet Take 1 tablet by mouth Every 6 (Six) Hours As Needed for Nausea or Vomiting. 30 tablet 0    oxyCODONE-acetaminophen (PERCOCET) 5-325 MG per tablet Take 1 tablet by mouth Every 4 (Four) Hours As Needed for Severe Pain. 50 tablet 0    simvastatin (ZOCOR) 10 MG tablet Take 1 tablet by mouth Every Night.      tiotropium bromide monohydrate (SPIRIVA RESPIMAT) 2.5 MCG/ACT aerosol solution inhaler Inhale 2 puffs Daily. 4 g 3    TiZANidine (ZANAFLEX) 2 MG capsule Take 1 capsule by mouth 3 (Three) Times a Day.       Allergies   Allergen Reactions    Latex Rash       Objective    Objective     Vital Signs  Temp:  [97 °F (36.1 °C)] 97 °F (36.1 °C)  Heart Rate:  [84] 84  Resp:  [18] 18  BP: (106-133)/(73-90) 133/75  SpO2:  [98 %] 98 %  on   ;   Device (Oxygen Therapy): room air  Body mass index is 30.41 kg/m².    Physical Exam  Vitals and nursing note reviewed.   Constitutional:       Appearance: She is obese.   HENT:      Head: Atraumatic.      Mouth/Throat:      Mouth: Mucous membranes are dry.   Eyes:      Conjunctiva/sclera: Conjunctivae normal.   Cardiovascular:      Rate and Rhythm: Normal rate and regular rhythm.      Pulses:           Radial pulses are 2+ on the right side and 2+ on the left side.        Dorsalis pedis pulses are 2+ on the right side and 2+ on the left side.        Posterior tibial pulses are 2+ on the right side and 2+ on the left side.      Heart sounds: Normal heart sounds.   Pulmonary:      Effort: Pulmonary  effort is normal.      Breath sounds: Examination of the right-lower field reveals decreased breath sounds. Examination of the left-lower field reveals decreased breath sounds. Decreased breath sounds present.   Abdominal:      General: Bowel sounds are normal.      Palpations: Abdomen is soft.   Musculoskeletal:         General: Swelling, deformity and signs of injury present.      Right lower leg: Edema present.      Right foot: Decreased range of motion. Swelling, deformity and tenderness present.      Comments: Right foot  great toe amputation   2nd toe right foot purulent drainage , edema and erythremia    Skin:     General: Skin is dry.      Capillary Refill: Capillary refill takes 2 to 3 seconds.      Findings: Erythema present.   Neurological:      Mental Status: She is alert.         Results Review:  I reviewed the patient's new clinical results.  I reviewed the patient's new imaging results and agree with the interpretation.  I reviewed the patient's other test results and agree with the interpretation  I personally viewed and interpreted the patient's EKG/Telemetry data  Discussed with ED provider.    Lab Results (last 24 hours)       Procedure Component Value Units Date/Time    CBC & Differential [[id number]]  (Abnormal) Collected: 04/30/24 2255    Specimen: Blood Updated: 04/30/24 2305    Narrative:      The following orders were created for panel order CBC & Differential.  Procedure                               Abnormality         Status                     ---------                               -----------         ------                     CBC Auto Differential[[id number]]        Abnormal            Final result                 Please view results for these tests on the individual orders.    Comprehensive Metabolic Panel [[id number]]  (Abnormal) Collected: 04/30/24 2255    Specimen: Blood Updated: 04/30/24 2319     Glucose 123 mg/dL      BUN 16 mg/dL      Creatinine 0.71 mg/dL      Sodium 140 mmol/L       Potassium 4.5 mmol/L      Chloride 103 mmol/L      CO2 32.2 mmol/L      Calcium 9.6 mg/dL      Total Protein 6.7 g/dL      Albumin 3.9 g/dL      ALT (SGPT) 10 U/L      AST (SGOT) 14 U/L      Alkaline Phosphatase 104 U/L      Total Bilirubin <0.2 mg/dL      Globulin 2.8 gm/dL      A/G Ratio 1.4 g/dL      BUN/Creatinine Ratio 22.5     Anion Gap 4.8 mmol/L      eGFR 98.7 mL/min/1.73     Narrative:      GFR Normal >60  Chronic Kidney Disease <60  Kidney Failure <15      Protime-INR [[id number]]  (Normal) Collected: 04/30/24 2255    Specimen: Blood Updated: 04/30/24 2315     Protime 13.7 Seconds      INR 1.03    aPTT [[id number]]  (Normal) Collected: 04/30/24 2255    Specimen: Blood Updated: 04/30/24 2315     PTT 24.2 seconds     Sedimentation Rate [[id number]]  (Normal) Collected: 04/30/24 2255    Specimen: Blood Updated: 04/30/24 2314     Sed Rate 25 mm/hr     C-reactive Protein [[id number]]  (Abnormal) Collected: 04/30/24 2255    Specimen: Blood Updated: 04/30/24 2319     C-Reactive Protein 4.34 mg/dL     Lactic Acid, Plasma [[id number]]  (Normal) Collected: 04/30/24 2255    Specimen: Blood Updated: 04/30/24 2316     Lactate 1.1 mmol/L     CBC Auto Differential [[id number]]  (Abnormal) Collected: 04/30/24 2255    Specimen: Blood Updated: 04/30/24 2305     WBC 8.39 10*3/mm3      RBC 4.24 10*6/mm3      Hemoglobin 12.1 g/dL      Hematocrit 36.9 %      MCV 87.0 fL      MCH 28.5 pg      MCHC 32.8 g/dL      RDW 13.3 %      RDW-SD 42.2 fl      MPV 9.5 fL      Platelets 235 10*3/mm3      Neutrophil % 46.6 %      Lymphocyte % 39.2 %      Monocyte % 10.0 %      Eosinophil % 3.1 %      Basophil % 0.6 %      Immature Grans % 0.5 %      Neutrophils, Absolute 3.91 10*3/mm3      Lymphocytes, Absolute 3.29 10*3/mm3      Monocytes, Absolute 0.84 10*3/mm3      Eosinophils, Absolute 0.26 10*3/mm3      Basophils, Absolute 0.05 10*3/mm3      Immature Grans, Absolute 0.04 10*3/mm3      nRBC 0.0 /100 WBC             Imaging Results (Last 24  Hours)       Procedure Component Value Units Date/Time    XR Foot 3+ View Right [[id number]] Collected: 04/30/24 2319     Updated: 04/30/24 2324    Narrative:      3 VIEWS RIGHT FOOT     HISTORY: Right foot pain     COMPARISON: None available.     FINDINGS:  The patient is status post amputation at the proximal phalanx of the  great toe. There is subluxation which is noted at the proximal  interphalangeal joint of the second toe. No aggressive osseous  abnormalities are seen. There is calcaneal spurring. Patient appears to  have soft tissue swelling about the forefoot. No subcutaneous gas or  retained foreign body is seen.       Impression:      Patient is status post amputation through the base of the proximal  phalanx of the great toe. No aggressive osseous abnormalities are seen,  although MRI would be more sensitive.     This report was finalized on 4/30/2024 11:21 PM by Dr. Sirisha Castelalnos M.D on Workstation: [id number]                   No orders to display        Assessment/Plan     Active Hospital Problems    Diagnosis  POA    **Cellulitis of toe of right foot [L03.031]  Yes    Type 2 diabetes mellitus with hyperglycemia, without long-term current use of insulin [E11.65]  Yes    Tobacco abuse [Z72.0]  Yes    Peripheral neuropathy [G62.9]  Yes    COPD (chronic obstructive pulmonary disease) [J44.9]  Yes    CAD (coronary artery disease) [I25.10]  Yes    HTN (hypertension) [I10]  Yes    Chronic back pain [M54.9, G89.29]  Yes      Resolved Hospital Problems   No resolved problems to display.     Cellulitis of toe of right foot  Acute   Following with outpatient-podiatry  Consult podiatry   Noted purulent drainage with erythema  Continue Vancomycin and cefepime  MRI right foot to rule out osteomyelitis-she has positive history of prior osteomyelitis of left great toe requiring amputation   One time dose versed before MRI for claustrophobia  Acetaminophen and oxycodone  as needed for fever, pain  management  Check CBC and BMP       Type 2 diabetes mellitus with hyperglycemia, without long-term current use of insulin  Chronic   Accu-Cheks ACHS  Hold home DM, metformin during hospitalization  SSI ordered for hyperglycemia  Hypoglycemia treatment per protocol        Peripheral neuropathy  Chronic   Continue home gabapentin     Chronic obstructive pulmonary disease  Chronic  Not in acute exacerbation  Not oxygen dependent at baseline  Symbicort, Spiriva, and albuterol      CAD (coronary artery disease)  Hyperlipidemia   HTN (hypertension)  Chronic/ Stable   She is free of chest pain at time of admission   Hold home ASA pending podiatry evaluation   Continue home statin , metoprolol    Tobacco abuse  Chronic   Cessation encouraged     Chronic back pain  Chronic   Continue home oxycodone     I discussed the patient's findings and my recommendations with patient and family.    VTE Prophylaxis - SCDs.  Code Status - Full code.       LAZARO Tripp  [facility]  05/01/24  01:25 EDT

## 2024-05-01 NOTE — ED TRIAGE NOTES
Pt states that she broke her right second toe approx 1.5wks ago. Pt unsure of injury. Pt states that she now has a sore to the area. Pt has great toe amputation to that foot.

## 2024-05-01 NOTE — PROGRESS NOTES
Discharge Planning Assessment  [facility]     Patient Name: Yenny Crawford  MRN: [id number]  Today's Date: 5/1/2024    Admit Date: 4/30/2024    Plan: Return home with family ( CCP will follow for needs, she is scheduled for  surgery on 5/2   Discharge Needs Assessment       Row Name 05/01/24 1054       Discharge Needs Assessment    Discharge Facility/Level of Care Needs home with home health    Provided Post Acute Provider List? Yes    Post Acute Provider List Home Health    Provided Post Acute Provider Quality & Resource List? Yes    Delivered To Patient    Method of Delivery In person    Patient's Choice of Community Agency(s) [facility]      Row Name 05/01/24 1038       Living Environment    People in Home child(leanne), adult    Name(s) of People in Home son Harshad Driver [phone]    Current Living Arrangements apartment    Primary Care Provided by self    Family Caregiver if Needed child(leanne), adult    Quality of Family Relationships unable to assess    Able to Return to Prior Arrangements yes       Transition Planning    Patient/Family Anticipates Transition to home with family    Patient/Family Anticipated Services at Transition home health care    Transportation Anticipated family or friend will provide       Discharge Needs Assessment    Equipment Currently Used at Home walker, rolling;crutches    Concerns to be Addressed discharge planning                   Discharge Plan       Row Name 05/01/24 1059       Plan    Plan Return home with family ( CCP will follow for needs, she is scheduled for  surgery on 5/2    Plan Comments Spoke with patient at bedside, face sheet verified. She lives with her son Harshad Driver [phone]. She stated she is independent with ADL's she has a walker,cane and crutches at home. If home health is needed she would use [facility] and [facility] if needed, no referral placed in [facility]. CCP will follow. Maye TORRES                  Continued Care and Services  - Admitted Since 4/30/2024    No active coordination exists for this encounter.          Demographic Summary       Row Name 05/01/24 1037       General Information    Admission Type observation    Referral Source admission list    Reason for Consult discharge planning    Preferred Language English                   Functional Status    No documentation.                  Psychosocial    No documentation.                  Abuse/Neglect    No documentation.                  Legal    No documentation.                  Substance Abuse    No documentation.                  Patient Forms    No documentation.                     Gia Oshea RN

## 2024-05-01 NOTE — PROGRESS NOTES
"[facility] Clinical Pharmacy Services: Vancomycin Pharmacokinetic Initial Consult Note    Yenny Crawford is a 58 y.o. female who is on day 1 of pharmacy to dose vancomycin.    Indication: Skin and Soft Tissue  Consulting Provider: Rita WILLIS  Planned Duration of Therapy: TBD by clinical course  Loading Dose Ordered or Given: 1750 mg on 5/1 at 0040  MRSA PCR performed: No  Culture/Source: 5/1 Wound cx pending  Target: -600 mg/L.hr   Pertinent Vanc Dosing History:   Other Antimicrobials: Cefepime    Vitals/Labs  Ht: 172.7 cm (68\"); Wt: 90.7 kg (200 lb)  Temp Readings from Last 1 Encounters:   04/30/24 97 °F (36.1 °C) (Tympanic)    Estimated Creatinine Clearance: 101.7 mL/min (by C-G formula based on SCr of 0.71 mg/dL).        Results from last 7 days   Lab Units 04/30/24  2255   CREATININE mg/dL 0.71   WBC 10*3/mm3 8.39     Assessment/Plan:    Vancomycin Dose:   1250 mg IV every  12  hours  Predictive AUC level for the dose ordered is 539 mg/L.hr, which is within the target of 400-600 mg/L.hr  Vanc Trough has been ordered for 5/2 at 1130     Pharmacy will follow patient's kidney function and will adjust doses and obtain levels as necessary. Thank you for involving pharmacy in this patient's care. Please contact pharmacy with any questions or concerns.                           M[facility] [place], [facility]  Clinical Pharmacist    "

## 2024-05-01 NOTE — ED PROVIDER NOTES
Pt presents to the ED c/o acute on chronic issues with her right foot.  Broke her toe beginning of April, has been following up with podiatry, they advised her to cam tape her toes.  Over the last 2 days has had significant skin discoloration or breakdown.  Was started on Keflex earlier today.  Does have diabetes and has had prior amputation of the right great toe.     On exam,   General: No acute distress, nontoxic  HEENT: EOMI  Pulm: Symmetric chest rise, nonlabored breathing  CV: Regular rate and rhythm  GI: Nondistended  MSK: Prior amputation of the right great toe, significant brownish-yellow discoloration to the second toe with edema and significant skin breakdown in the area between the second and third digits.  Some mild foot edema and erythema over the MTP area.  Skin: Warm, dry  Neuro: Awake, alert, oriented x 4, moving all extremities, no focal deficits  Psych: Calm, cooperative    Vital signs and nursing notes reviewed.           Plan: Elevated CRP, concerning appearing digits on the foot, plan for labs and x-ray and potential hospitalization.    ED Course as of 05/01/24 0030 Tue Apr 30, 2024   2251 I discussed the case with Dr. Hansen and they agree to evaluate the patient at the bedside.    [CC]   2323 C-Reactive Protein(!): 4.34 [CC]   2323 Lactate: 1.1 [CC]   2323 WBC: 8.39 [CC]   Wed May 01, 2024   0004 Spoke with DARELL Curry with A.  Reviewed history, exam, results, treatments.  She agrees admit the patient to Dr. Dodge.      [CC]      ED Course User Index  [CC] Norma Olvera PA-C       Plan for hospitalization for IV antibiotics and, may need MRI of the foot.  Certainly concern for active infection at this time.     MD Attestation Note    SHARED VISIT: This visit was performed by BOTH a physician and an APC. The substantive portion of the medical decision making was performed by this attesting physician who made or approved the management plan and takes responsibility for patient  management. All studies in the APC note (if performed) were independently interpreted by me.                   Moses Hansen MD  05/01/24 0032

## 2024-05-01 NOTE — PAYOR COMM NOTE
"Yenny Berg (58 y.o. Female)          INPATIENT REQUEST FOR [id number]   DX - L08.9 (ICD-10-CM) - Toe infection     [facility] - NPI -  [id number]  ATTENDING MD - KORY ZHANG-  [id number]     CONTACT FIGUEROA BARAHONA  P# [phone]  F# [phone]               Date of Birth   1965    Social Security Number       Address   [address]  [facility] [address] [facility] [address]    Home Phone   [phone]    MRN   [id number]       [facility]    Marital Status                               Admission Date   4/30/24    Admission Type   Emergency    Admitting Provider   Uriel Dodge MD    Attending Provider   Bello Valentine MD    Department, Room/Bed   [facility] [address] [place], 80/1       Discharge Date       Discharge Disposition       Discharge Destination                                 Attending Provider: Bello Valentine MD    Allergies: Latex    Isolation: None   Infection: None   Code Status: CPR    Ht: 172.7 cm (68\")   Wt: 101 kg (221 lb 9 oz)    Admission Cmt: None   Principal Problem: Cellulitis of toe of right foot [L03.031]                   Active Insurance as of 4/30/2024       Primary Coverage       Payor Plan Insurance Group Employer/Plan Group    [facility] BY ELISE [id number]       Payor Plan Address Payor Plan Phone Number Payor Plan Fax Number Effective Dates    [address] [address]   1/1/2021 - None Entered    [facility] [id number]         Subscriber Name Subscriber Birth Date Member ID       YENNY BERG 1965 [id number]                     Emergency Contacts        (Rel.) Home Phone Work Phone Mobile Phone    HUSSEIN HARPER (Son) -- -- [phone]                 History & Physical        Rita Watts APRN at 05/01/24 0123       Attestation signed by Bello Valetnine MD at 05/01/24 0920    I have reviewed this documentation and agree.      Addendum: I have reviewed the history and plan as obtained by  Rita " Mac  and agree with below documentation with exception of the following:    Physical Exam  Constitutional:       General: Patient is not in acute distress.     Appearance: Normal appearance. Not toxic-appearing.   HENT:      Head: Normocephalic and atraumatic.   Cardiovascular:      Rate and Rhythm: Normal rate and regular rhythm.   Pulmonary:      Effort: Pulmonary effort is normal. No respiratory distress. rhonchi.   Abdominal:  There is no distension.   Skin:     General: Right great toe amputated.  Second and third toe with swelling, erythema, drainage  Neurological:      General: No focal deficit present.      Mental Status: At baseline   .     I have personally reviewed:  [x]  Laboratory   []  Microbiology   [x]  Radiology   [x]  EKG/Telemetry   []  Cardiology/Vascular   []  Pathology   [x]  Some old records     Cellulitis of right second toe  History of right great toe amputation  -Has an outpatient podiatrist, consult podiatry  -Continue vancomycin and cefepime  -MRI pending  -Pain management    Diabetes type 2 with peripheral neuropathy  -Hold home metformin and Jardiance  -Low-dose sliding scale for now, will titrate as needed  -Continue home gabapentin    Coronary artery disease  Hyperlipidemia  Hypertension  -Hold aspirin until podiatry evaluation.  Continue statin metoprolol    COPD  Nicotine use disorder      The majority of medical decision making (>50%) for this encounter was completed by myself and discussed with this APRN    Bello Valentine MD  09:15 EDT                      Patient Name:  Yenny Crawford  YOB: 1965  MRN:  [id number]  Admit Date:  4/30/2024  Patient Care Team:  Kassy Antonio APRN as PCP - General (Family Medicine)      Subjective  History Present Illness     Chief Complaint   Patient presents with    Wound Check    Toe Pain     History of Present Illness  Ty Nolasco is a 58 y o female smoker with history of T2DM, HTN, HLD, COPD, type 2 diabetes mellitus  and great toe amputation secondary to osteomyelitis who presents to [facility] today with complaints of a broke toe and wound on her right foot. 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 She reports the tape was in place since until she seen podiatrist again today who noted she had some swelling  and a wound lateral side of her second toe and started her on Keflex. She  had osteomyelitis of great toe right foot previously that required amputation, so she was concerned that it may develop again so she presented to the emergency room for further evaluation, She denies any other acute distress chest pain, shortness of breath, patient's, lightheadedness, dizziness, fever, chills, cough, abdominal pain nausea, vomiting, diarrhea, constipation or  symptoms.    Upon exam she is awake, alert and oriented in no acute distress, her lungs are diminished bilaterally, her HR and rhythm are regular, normal with no clicks, rubs, gallops, or murmurs noted. Her abdomen is soft, round non tender, non distended, with audible sounds in all 4 quadrants. She has noted great toe amputation of right foot, second toe has note wound on medial side, with purulent drainage, her right foot is edematous, with erythema from toes up to near her ankle, and is warm to touch.     Evaluation in the emergency room revealed she hemodynamically stable blood pressure 132/80, heart rate 84, respiratory rate 18, oxygen saturation 98% on room air, she is afebrile Tmax 97.0.  Since completing CRP at 4.34 sed rate, normal WBC count.  X-ray right foot shows no aggressive osseous abnormalities, calcanela spurring, soft tissue swelling of forefoot, no subcutaneous gas or retained body is seen,   She was initiated on vancomycin and cefepime.     Ty FOSS will be admitted for further evaluation and treatment of  wound/ cellulitis of 2nd toe of her right foot and  other acute and chronic conditions as needed.     Review of Systems   Constitutional: Negative.  Negative for chills, fatigue and fever.   HENT: Negative.  Negative for congestion and trouble swallowing.    Eyes: Negative.  Negative for visual disturbance.   Respiratory: Negative.  Negative for cough, chest tightness and shortness of breath.    Cardiovascular: Negative.  Negative for chest pain, palpitations and leg swelling.   Gastrointestinal: Negative.  Negative for abdominal distention, abdominal pain, constipation, diarrhea, nausea and vomiting.   Endocrine: Negative.    Genitourinary: Negative.  Negative for difficulty urinating, dysuria and frequency.   Musculoskeletal:  Positive for gait problem. Negative for arthralgias.        Right toe injury    Skin:  Positive for color change and wound. Negative for rash.   Neurological: Negative.  Negative for dizziness and headaches.   Hematological: Negative.    Psychiatric/Behavioral: Negative.  Negative for confusion.    All other systems reviewed and are negative.       Personal History     Past Medical History:   Diagnosis Date    Arthritis     Asthma     COPD (chronic obstructive pulmonary disease)     Degenerative disc disease, lumbar     CHRONIC    Diabetes mellitus     GERD (gastroesophageal reflux disease)     Hyperlipidemia     Hypertension     Neuropathy     CONOR FEET    Osteoarthritis     LEFT KNEE    Positive colorectal cancer screening using Cologuard test     PVD (peripheral vascular disease)     Seasonal allergies     Sleep apnea     NO CURRENT DEVICE     Past Surgical History:   Procedure Laterality Date    APPENDECTOMY      ECTOPIC PREGNANCY      JOINT REPLACEMENT Bilateral     KNEE ARTHROPLASTY    TOTAL KNEE ARTHROPLASTY REVISION Left 10/2/2023    Procedure: TOTAL KNEE ARTHROPLASTY REVISION WITH CORI ROBOT;  Surgeon: Luis Ansari II, MD;  Location: [facility] OR [facility];  Service: Robotics - Ortho;  Laterality: Left;    WISDOM TOOTH EXTRACTION        Family History   Problem Relation Age of Onset    Malig Hyperthermia Neg Hx      Social History     Tobacco Use    Smoking status: Every Day     Current packs/day: 0.50     Average packs/day: 0.5 packs/day for 45.0 years (22.5 ttl pk-yrs)     Types: Cigarettes    Smokeless tobacco: Never   Vaping Use    Vaping status: Former   Substance Use Topics    Alcohol use: Yes     Comment: SOCIALLY    Drug use: Never     No current facility-administered medications on file prior to encounter.     Current Outpatient Medications on File Prior to Encounter   Medication Sig Dispense Refill    acyclovir (ZOVIRAX) 400 MG tablet Take 1 tablet by mouth Daily As Needed.      albuterol sulfate  (90 Base) MCG/ACT inhaler Inhale 2 puffs Every 4 (Four) Hours As Needed.      budesonide-formoterol (SYMBICORT) 160-4.5 MCG/ACT inhaler Inhale 2 puffs 2 (Two) Times a Day. 10.2 g 12    CHLORHEXIDINE GLUCONATE CLOTH EX Apply  topically. USE AS DIRECTED      Cholecalciferol 50 MCG (2000 UT) tablet Take 1 tablet by mouth Daily. HOLDING FOR DOS      Cyanocobalamin 1000 MCG/ML kit Inject 1 mL into the appropriate muscle as directed by prescriber Every 30 (Thirty) Days.      docusate sodium 100 MG capsule Take 1 capsule by mouth 2 (Two) Times a Day.      gabapentin (NEURONTIN) 600 MG tablet Take 1 tablet by mouth 3 (Three) Times a Day.      Jardiance 10 MG tablet tablet Take 1 tablet by mouth Daily. Indications: Type 2 Diabetes      loratadine (CLARITIN) 10 MG tablet Take 1 tablet by mouth Daily.      metFORMIN (GLUCOPHAGE) 850 MG tablet Take 1 tablet by mouth 2 (Two) Times a Day With Meals. NOT CURRENTLY TAKING  Indications: Type 2 Diabetes      metoprolol tartrate (LOPRESSOR) 25 MG tablet Take 1 tablet by mouth 2 (Two) Times a Day.      nitroglycerin (NITROSTAT) 0.4 MG SL tablet Place 1 tablet under the tongue Every 5 (Five) Minutes As Needed.      Omega-3 Fatty Acids (fish oil) 1200 MG capsule capsule Take 1 capsule by mouth Daily With  Breakfast. HOLDING FOR DOS      ondansetron (Zofran) 4 MG tablet Take 1 tablet by mouth Every 6 (Six) Hours As Needed for Nausea or Vomiting. 30 tablet 0    oxyCODONE-acetaminophen (PERCOCET) 5-325 MG per tablet Take 1 tablet by mouth Every 4 (Four) Hours As Needed for Severe Pain. 50 tablet 0    simvastatin (ZOCOR) 10 MG tablet Take 1 tablet by mouth Every Night.      tiotropium bromide monohydrate (SPIRIVA RESPIMAT) 2.5 MCG/ACT aerosol solution inhaler Inhale 2 puffs Daily. 4 g 3    TiZANidine (ZANAFLEX) 2 MG capsule Take 1 capsule by mouth 3 (Three) Times a Day.       Allergies   Allergen Reactions    Latex Rash       Objective   Objective     Vital Signs  Temp:  [97 °F (36.1 °C)] 97 °F (36.1 °C)  Heart Rate:  [84] 84  Resp:  [18] 18  BP: (106-133)/(73-90) 133/75  SpO2:  [98 %] 98 %  on   ;   Device (Oxygen Therapy): room air  Body mass index is 30.41 kg/m².    Physical Exam  Vitals and nursing note reviewed.   Constitutional:       Appearance: She is obese.   HENT:      Head: Atraumatic.      Mouth/Throat:      Mouth: Mucous membranes are dry.   Eyes:      Conjunctiva/sclera: Conjunctivae normal.   Cardiovascular:      Rate and Rhythm: Normal rate and regular rhythm.      Pulses:           Radial pulses are 2+ on the right side and 2+ on the left side.        Dorsalis pedis pulses are 2+ on the right side and 2+ on the left side.        Posterior tibial pulses are 2+ on the right side and 2+ on the left side.      Heart sounds: Normal heart sounds.   Pulmonary:      Effort: Pulmonary effort is normal.      Breath sounds: Examination of the right-lower field reveals decreased breath sounds. Examination of the left-lower field reveals decreased breath sounds. Decreased breath sounds present.   Abdominal:      General: Bowel sounds are normal.      Palpations: Abdomen is soft.   Musculoskeletal:         General: Swelling, deformity and signs of injury present.      Right lower leg: Edema present.      Right foot:  Decreased range of motion. Swelling, deformity and tenderness present.      Comments: Right foot  great toe amputation   2nd toe right foot purulent drainage , edema and erythremia    Skin:     General: Skin is dry.      Capillary Refill: Capillary refill takes 2 to 3 seconds.      Findings: Erythema present.   Neurological:      Mental Status: She is alert.         Results Review:  I reviewed the patient's new clinical results.  I reviewed the patient's new imaging results and agree with the interpretation.  I reviewed the patient's other test results and agree with the interpretation  I personally viewed and interpreted the patient's EKG/Telemetry data  Discussed with ED provider.    Lab Results (last 24 hours)       Procedure Component Value Units Date/Time    CBC & Differential [[id number]]  (Abnormal) Collected: 04/30/24 2255    Specimen: Blood Updated: 04/30/24 2305    Narrative:      The following orders were created for panel order CBC & Differential.  Procedure                               Abnormality         Status                     ---------                               -----------         ------                     CBC Auto Differential[[id number]]        Abnormal            Final result                 Please view results for these tests on the individual orders.    Comprehensive Metabolic Panel [[id number]]  (Abnormal) Collected: 04/30/24 2255    Specimen: Blood Updated: 04/30/24 2319     Glucose 123 mg/dL      BUN 16 mg/dL      Creatinine 0.71 mg/dL      Sodium 140 mmol/L      Potassium 4.5 mmol/L      Chloride 103 mmol/L      CO2 32.2 mmol/L      Calcium 9.6 mg/dL      Total Protein 6.7 g/dL      Albumin 3.9 g/dL      ALT (SGPT) 10 U/L      AST (SGOT) 14 U/L      Alkaline Phosphatase 104 U/L      Total Bilirubin <0.2 mg/dL      Globulin 2.8 gm/dL      A/G Ratio 1.4 g/dL      BUN/Creatinine Ratio 22.5     Anion Gap 4.8 mmol/L      eGFR 98.7 mL/min/1.73     Narrative:      GFR Normal >60  Chronic  Kidney Disease <60  Kidney Failure <15      Protime-INR [[id number]]  (Normal) Collected: 04/30/24 2255    Specimen: Blood Updated: 04/30/24 2315     Protime 13.7 Seconds      INR 1.03    aPTT [[id number]]  (Normal) Collected: 04/30/24 2255    Specimen: Blood Updated: 04/30/24 2315     PTT 24.2 seconds     Sedimentation Rate [[id number]]  (Normal) Collected: 04/30/24 2255    Specimen: Blood Updated: 04/30/24 2314     Sed Rate 25 mm/hr     C-reactive Protein [[id number]]  (Abnormal) Collected: 04/30/24 2255    Specimen: Blood Updated: 04/30/24 2319     C-Reactive Protein 4.34 mg/dL     Lactic Acid, Plasma [[id number]]  (Normal) Collected: 04/30/24 2255    Specimen: Blood Updated: 04/30/24 2316     Lactate 1.1 mmol/L     CBC Auto Differential [[id number]]  (Abnormal) Collected: 04/30/24 2255    Specimen: Blood Updated: 04/30/24 2305     WBC 8.39 10*3/mm3      RBC 4.24 10*6/mm3      Hemoglobin 12.1 g/dL      Hematocrit 36.9 %      MCV 87.0 fL      MCH 28.5 pg      MCHC 32.8 g/dL      RDW 13.3 %      RDW-SD 42.2 fl      MPV 9.5 fL      Platelets 235 10*3/mm3      Neutrophil % 46.6 %      Lymphocyte % 39.2 %      Monocyte % 10.0 %      Eosinophil % 3.1 %      Basophil % 0.6 %      Immature Grans % 0.5 %      Neutrophils, Absolute 3.91 10*3/mm3      Lymphocytes, Absolute 3.29 10*3/mm3      Monocytes, Absolute 0.84 10*3/mm3      Eosinophils, Absolute 0.26 10*3/mm3      Basophils, Absolute 0.05 10*3/mm3      Immature Grans, Absolute 0.04 10*3/mm3      nRBC 0.0 /100 WBC             Imaging Results (Last 24 Hours)       Procedure Component Value Units Date/Time    XR Foot 3+ View Right [[id number]] Collected: 04/30/24 2319     Updated: 04/30/24 2324    Narrative:      3 VIEWS RIGHT FOOT     HISTORY: Right foot pain     COMPARISON: None available.     FINDINGS:  The patient is status post amputation at the proximal phalanx of the  great toe. There is subluxation which is noted at the proximal  interphalangeal joint of the second toe.  No aggressive osseous  abnormalities are seen. There is calcaneal spurring. Patient appears to  have soft tissue swelling about the forefoot. No subcutaneous gas or  retained foreign body is seen.       Impression:      Patient is status post amputation through the base of the proximal  phalanx of the great toe. No aggressive osseous abnormalities are seen,  although MRI would be more sensitive.     This report was finalized on 4/30/2024 11:21 PM by Dr. Sirisha Castellanos M.D on Workstation: [id number]                   No orders to display        Assessment/Plan     Active Hospital Problems    Diagnosis  POA    **Cellulitis of toe of right foot [L03.031]  Yes    Type 2 diabetes mellitus with hyperglycemia, without long-term current use of insulin [E11.65]  Yes    Tobacco abuse [Z72.0]  Yes    Peripheral neuropathy [G62.9]  Yes    COPD (chronic obstructive pulmonary disease) [J44.9]  Yes    CAD (coronary artery disease) [I25.10]  Yes    HTN (hypertension) [I10]  Yes    Chronic back pain [M54.9, G89.29]  Yes      Resolved Hospital Problems   No resolved problems to display.     Cellulitis of toe of right foot  Acute   Following with outpatient-podiatry  Consult podiatry   Noted purulent drainage with erythema  Continue Vancomycin and cefepime  MRI right foot to rule out osteomyelitis-she has positive history of prior osteomyelitis of left great toe requiring amputation   One time dose versed before MRI for claustrophobia  Acetaminophen and oxycodone  as needed for fever, pain management  Check CBC and BMP       Type 2 diabetes mellitus with hyperglycemia, without long-term current use of insulin  Chronic   Accu-Cheks ACHS  Hold home DM, metformin during hospitalization  SSI ordered for hyperglycemia  Hypoglycemia treatment per protocol        Peripheral neuropathy  Chronic   Continue home gabapentin     Chronic obstructive pulmonary disease  Chronic  Not in acute exacerbation  Not oxygen dependent at  baseline  Symbicort, Spiriva, and albuterol      CAD (coronary artery disease)  Hyperlipidemia   HTN (hypertension)  Chronic/ Stable   She is free of chest pain at time of admission   Hold home ASA pending podiatry evaluation   Continue home statin , metoprolol    Tobacco abuse  Chronic   Cessation encouraged     Chronic back pain  Chronic   Continue home oxycodone     I discussed the patient's findings and my recommendations with patient and family.    VTE Prophylaxis - SCDs.  Code Status - Full code.       LAZARO Tripp  [facility]  05/01/24  01:25 EDT    Electronically signed by Bello Valentine MD at 05/01/24 0920          Emergency Department Notes        Susan Moura RN at 05/01/24 0140          MRI History form filled out and faxed, placed in chart for patient transport.     Electronically signed by Susan Moura RN at 05/01/24 0155       Susan Moura RN at 05/01/24 0121          Nursing report ED to floor  Yenny Crawford  58 y.o.  female    HPI :  HPI (Adult)  Stated Reason for Visit: toe pain    Chief Complaint  Chief Complaint   Patient presents with    Wound Check    Toe Pain     Yenny Crawford is a 58 y.o. female with a medical history of COPD, CAD, hypertension, type 2 diabetes, peripheral neuropathy who presents emergency department with an open wound to her right foot.  Patient says that she believes that she broke her toe at the beginning of April.  She says she does not remember any specific trauma or injury but says that she saw her podiatrist for routine follow-up on 416 they shot an x-ray and found her toe to be dislocated/fractured.  She says that she wrapped the foot and the patient went for follow-up today and was started on Keflex.  The patient says since the toe has been broken she has been cam taping it to the toe next to it and says the skin has now eroded and she has an open wound and is concerned there is exposed bone.  Patient denies fevers or chills.   She has already had a prior amputation to her right great toe.  She says her diabetes has been well-controlled and blood glucose has been in the 100s.  She denies any significant pain to the toe but does have peripheral neuropathy.     Admitting doctor:   Uriel Dodge MD    Admitting diagnosis:   The encounter diagnosis was Toe infection.    Code status:   Current Code Status       Date Active Code Status Order ID Comments User Context       5/1/2024 0034 CPR (Attempt to Resuscitate) [id number]  Rita Watts APRN ED        Question Answer    Code Status (Patient has no pulse and is not breathing) CPR (Attempt to Resuscitate)    Medical Interventions (Patient has pulse or is breathing) Full Support                    Allergies:   Latex    Isolation:   No active isolations    Intake and Output  No intake or output data in the 24 hours ending 05/01/24 0121    Weight:       04/30/24 2216   Weight: 90.7 kg (200 lb)       Most recent vitals:   Vitals:    04/30/24 2218 04/30/24 2301 04/30/24 2331 05/01/24 0039   BP: 126/90 106/73 121/86 133/75   BP Location: Right arm      Patient Position: Standing      Pulse:       Resp:       Temp:       TempSrc:       SpO2:       Weight:       Height:           Active LDAs/IV Access:   Lines, Drains & Airways       Active LDAs       Name Placement date Placement time Site Days    Peripheral IV 04/30/24 2231 Anterior;Right Forearm 04/30/24 2231  Forearm  less than 1                    Labs (abnormal labs have a star):   Labs Reviewed   COMPREHENSIVE METABOLIC PANEL - Abnormal; Notable for the following components:       Result Value    Glucose 123 (*)     CO2 32.2 (*)     Anion Gap 4.8 (*)     All other components within normal limits    Narrative:     GFR Normal >60  Chronic Kidney Disease <60  Kidney Failure <15     C-REACTIVE PROTEIN - Abnormal; Notable for the following components:    C-Reactive Protein 4.34 (*)     All other components within normal limits   CBC  WITH AUTO DIFFERENTIAL - Abnormal; Notable for the following components:    Lymphocytes, Absolute 3.29 (*)     All other components within normal limits   PROTIME-INR - Normal   APTT - Normal   SEDIMENTATION RATE - Normal   LACTIC ACID, PLASMA - Normal   BASIC METABOLIC PANEL   CBC (NO DIFF)   POCT GLUCOSE FINGERSTICK   POCT GLUCOSE FINGERSTICK   POCT GLUCOSE FINGERSTICK   POCT GLUCOSE FINGERSTICK   CBC AND DIFFERENTIAL    Narrative:     The following orders were created for panel order CBC & Differential.  Procedure                               Abnormality         Status                     ---------                               -----------         ------                     CBC Auto Differential[[id number]]        Abnormal            Final result                 Please view results for these tests on the individual orders.       EKG:   No orders to display       Meds given in ED:   Medications   vancomycin 1750 mg/500 mL 0.9% NS IVPB ([facility]) (1,750 mg Intravenous New Bag 5/1/24 0040)   sodium chloride 0.9 % flush 10 mL (10 mL Intravenous Given 5/1/24 0040)   sodium chloride 0.9 % flush 10 mL (has no administration in time range)   sodium chloride 0.9 % infusion 40 mL (has no administration in time range)   dextrose (GLUTOSE) oral gel 15 g (has no administration in time range)   dextrose (D50W) (25 g/50 mL) IV injection 25 g (has no administration in time range)   glucagon (GLUCAGEN) injection 1 mg (has no administration in time range)   insulin lispro (HUMALOG/ADMELOG) injection 2-9 Units (has no administration in time range)   nitroglycerin (NITROSTAT) SL tablet 0.4 mg (has no administration in time range)   sennosides-docusate (PERICOLACE) 8.6-50 MG per tablet 2 tablet (has no administration in time range)     And   polyethylene glycol (MIRALAX) packet 17 g (has no administration in time range)     And   bisacodyl (DULCOLAX) EC tablet 5 mg (has no administration in time range)     And   bisacodyl (DULCOLAX)  suppository 10 mg (has no administration in time range)   acetaminophen (TYLENOL) tablet 650 mg (has no administration in time range)     Or   acetaminophen (TYLENOL) 160 MG/5ML oral solution 650 mg (has no administration in time range)     Or   acetaminophen (TYLENOL) suppository 650 mg (has no administration in time range)   ondansetron ODT (ZOFRAN-ODT) disintegrating tablet 4 mg (has no administration in time range)     Or   ondansetron (ZOFRAN) injection 4 mg (has no administration in time range)   cefepime 2000 mg IVPB in 100 mL NS (MBP) (0 mg Intravenous Stopped 5/1/24 0039)       Imaging results:  XR Foot 3+ View Right    Result Date: 4/30/2024  Patient is status post amputation through the base of the proximal phalanx of the great toe. No aggressive osseous abnormalities are seen, although MRI would be more sensitive.  This report was finalized on 4/30/2024 11:21 PM by Dr. Sirisha Castellanso M.D on Workstation: [id number]       Ambulatory status:   - Standby     Social issues:   Social History     Socioeconomic History    Marital status:    Tobacco Use    Smoking status: Every Day     Current packs/day: 0.50     Average packs/day: 0.5 packs/day for 45.0 years (22.5 ttl pk-yrs)     Types: Cigarettes    Smokeless tobacco: Never   Vaping Use    Vaping status: Former   Substance and Sexual Activity    Alcohol use: Yes     Comment: SOCIALLY    Drug use: Never    Sexual activity: Defer       Peripheral Neurovascular  Peripheral Neurovascular (Adult)  Peripheral Neurovascular WDL: .WDL except, neurovascular assessment lower, pulse assessment  Pulse Assessment: dorsalis pedis, popliteal  LLE Neurovascular Assessment  Temperature LLE: warm  Color LLE: no discoloration  Sensation LLE: no numbness, no tenderness, no tingling  RLE Neurovascular Assessment  Temperature RLE: hot  Color RLE: red  Sensation RLE: tenderness present, no numbness, no tingling    Neuro Cognitive  Neuro Cognitive  (Adult)  Cognitive/Neuro/Behavioral WDL: WDL    Learning  Learning Assessment (Adult)  Learning Readiness and Ability: no barriers identified    Respiratory  Respiratory WDL  Respiratory WDL: WDL    Abdominal Pain       Pain Assessments  Pain (Adult)  (0-10) Pain Rating: Rest: 10    NIH Stroke Scale       Susan Moura RN  05/01/24 01:21 EDT     Call Susan #[phone] with any questions    Electronically signed by Susan Moura RN at 05/01/24 0122       Moses Hansen MD at 04/30/24 6262          Pt presents to the ED c/o acute on chronic issues with her right foot.  Broke her toe beginning of April, has been following up with podiatry, they advised her to cam tape her toes.  Over the last 2 days has had significant skin discoloration or breakdown.  Was started on Keflex earlier today.  Does have diabetes and has had prior amputation of the right great toe.     On exam,   General: No acute distress, nontoxic  HEENT: EOMI  Pulm: Symmetric chest rise, nonlabored breathing  CV: Regular rate and rhythm  GI: Nondistended  MSK: Prior amputation of the right great toe, significant brownish-yellow discoloration to the second toe with edema and significant skin breakdown in the area between the second and third digits.  Some mild foot edema and erythema over the MTP area.  Skin: Warm, dry  Neuro: Awake, alert, oriented x 4, moving all extremities, no focal deficits  Psych: Calm, cooperative    Vital signs and nursing notes reviewed.           Plan: Elevated CRP, concerning appearing digits on the foot, plan for labs and x-ray and potential hospitalization.    ED Course as of 05/01/24 0030 Tue Apr 30, 2024 2251 I discussed the case with Dr. Hansen and they agree to evaluate the patient at the bedside.    [CC]   2323 C-Reactive Protein(!): 4.34 [CC]   2323 Lactate: 1.1 [CC]   2323 WBC: 8.39 [CC]   Wed May 01, 2024   0004 Spoke with DARELL Curry with [facility].  Reviewed history, exam, results, treatments.  She agrees admit the patient  to Dr. Dodge.      [CC]      ED Course User Index  [CC] Norma Olvera PA-C       Plan for hospitalization for IV antibiotics and, may need MRI of the foot.  Certainly concern for active infection at this time.     MD Attestation Note    SHARED VISIT: This visit was performed by BOTH a physician and an APC. The substantive portion of the medical decision making was performed by this attesting physician who made or approved the management plan and takes responsibility for patient management. All studies in the APC note (if performed) were independently interpreted by me.                   Moses Hansen MD  05/01/24 0032      Electronically signed by Moses Hansen MD at 05/01/24 0032       Norma Olvera PA-C at 04/30/24 4810           EMERGENCY DEPARTMENT ENCOUNTER  Room Number:  08/08  PCP: Kassy Antonio APRN  Independent Historians: Patient      HPI:  Chief Complaint: had concerns including Wound Check and Toe Pain.     A complete HPI/ROS/PMH/PSH/SH/FH are unobtainable due to: None    Chronic or social conditions impacting patient care (Social Determinants of Health): None      Context: Yenny Crawford is a 58 y.o. female with a medical history of COPD, CAD, hypertension, type 2 diabetes, peripheral neuropathy who presents emergency department with an open wound to her right foot.  Patient says that she believes that she broke her toe at the beginning of April.  She says she does not remember any specific trauma or injury but says that she saw her podiatrist for routine follow-up on 416 they shot an x-ray and found her toe to be dislocated/fractured.  She says that she wrapped the foot and the patient went for follow-up today and was started on Keflex.  The patient says since the toe has been broken she has been cam taping it to the toe next to it and says the skin has now eroded and she has an open wound and is concerned there is exposed bone.  Patient denies fevers or chills.  She has  already had a prior amputation to her right great toe.  She says her diabetes has been well-controlled and blood glucose has been in the 100s.  She denies any significant pain to the toe but does have peripheral neuropathy.      Review of prior external notes (non-ED) -and- Review of prior external test results outside of this encounter:   A1c from 9/29/2023 was 5.8    I reviewed labs from 10/3/2023, hemoglobin 11.5, creatinine 0.85      PAST MEDICAL HISTORY  Active Ambulatory Problems     Diagnosis Date Noted    Status post knee replacement 10/02/2023    COPD (chronic obstructive pulmonary disease) 10/03/2023    CAD (coronary artery disease) 10/03/2023    Tobacco abuse 10/03/2023    HTN (hypertension) 10/03/2023    Type 2 diabetes mellitus with hyperglycemia, without long-term current use of insulin 10/03/2023    Peripheral neuropathy 10/03/2023    Chronic back pain 10/03/2023     Resolved Ambulatory Problems     Diagnosis Date Noted    No Resolved Ambulatory Problems     Past Medical History:   Diagnosis Date    Arthritis     Asthma     Degenerative disc disease, lumbar     Diabetes mellitus     GERD (gastroesophageal reflux disease)     Hyperlipidemia     Hypertension     Neuropathy     Osteoarthritis     Positive colorectal cancer screening using Cologuard test     PVD (peripheral vascular disease)     Seasonal allergies     Sleep apnea          PAST SURGICAL HISTORY  Past Surgical History:   Procedure Laterality Date    APPENDECTOMY      ECTOPIC PREGNANCY      JOINT REPLACEMENT Bilateral     KNEE ARTHROPLASTY    TOTAL KNEE ARTHROPLASTY REVISION Left 10/2/2023    Procedure: TOTAL KNEE ARTHROPLASTY REVISION WITH CORI ROBOT;  Surgeon: Luis Ansari II, MD;  Location: [facility] OR [facility] – [place];  Service: Robotics - Ortho;  Laterality: Left;    WISDOM TOOTH EXTRACTION           FAMILY HISTORY  Family History   Problem Relation Age of Onset    Malig Hyperthermia Neg Hx          SOCIAL HISTORY  Social History      Socioeconomic History    Marital status:    Tobacco Use    Smoking status: Every Day     Current packs/day: 0.50     Average packs/day: 0.5 packs/day for 45.0 years (22.5 ttl pk-yrs)     Types: Cigarettes    Smokeless tobacco: Never   Vaping Use    Vaping status: Former   Substance and Sexual Activity    Alcohol use: Yes     Comment: SOCIALLY    Drug use: Never    Sexual activity: Defer         ALLERGIES  Latex      REVIEW OF SYSTEMS  Included in HPI  All systems reviewed and negative except for those discussed in HPI.      PHYSICAL EXAM    I have reviewed the triage vital signs and nursing notes.    ED Triage Vitals   Temp Heart Rate Resp BP SpO2   04/30/24 2216 04/30/24 2216 04/30/24 2216 04/30/24 2218 04/30/24 2216   97 °F (36.1 °C) 84 18 126/90 98 %      Temp src Heart Rate Source Patient Position BP Location FiO2 (%)   04/30/24 2216 04/30/24 2216 04/30/24 2218 04/30/24 2218 --   Tympanic Monitor Standing Right arm        Physical Exam  Constitutional:       General: She is not in acute distress.     Appearance: Normal appearance. She is obese.   HENT:      Head: Normocephalic and atraumatic.      Nose: Nose normal.      Mouth/Throat:      Mouth: Mucous membranes are moist.   Eyes:      Extraocular Movements: Extraocular movements intact.      Pupils: Pupils are equal, round, and reactive to light.   Cardiovascular:      Rate and Rhythm: Normal rate and regular rhythm.      Pulses: Normal pulses.      Heart sounds: Normal heart sounds.   Pulmonary:      Effort: Pulmonary effort is normal. No respiratory distress.      Breath sounds: Normal breath sounds.   Abdominal:      General: Abdomen is flat. There is no distension.      Palpations: Abdomen is soft.      Tenderness: There is no abdominal tenderness.   Musculoskeletal:         General: Normal range of motion.      Cervical back: Normal range of motion and neck supple.   Skin:     General: Skin is warm and dry.      Capillary Refill: Capillary  refill takes less than 2 seconds.   Neurological:      General: No focal deficit present.      Mental Status: She is alert and oriented to person, place, and time.   Psychiatric:         Mood and Affect: Mood normal.         Behavior: Behavior normal.                 LAB RESULTS  Recent Results (from the past 24 hour(s))   Comprehensive Metabolic Panel    Collection Time: 04/30/24 10:55 PM    Specimen: Blood   Result Value Ref Range    Glucose 123 (H) 65 - 99 mg/dL    BUN 16 6 - 20 mg/dL    Creatinine 0.71 0.57 - 1.00 mg/dL    Sodium 140 136 - 145 mmol/L    Potassium 4.5 3.5 - 5.2 mmol/L    Chloride 103 98 - 107 mmol/L    CO2 32.2 (H) 22.0 - 29.0 mmol/L    Calcium 9.6 8.6 - 10.5 mg/dL    Total Protein 6.7 6.0 - 8.5 g/dL    Albumin 3.9 3.5 - 5.2 g/dL    ALT (SGPT) 10 1 - 33 U/L    AST (SGOT) 14 1 - 32 U/L    Alkaline Phosphatase 104 39 - 117 U/L    Total Bilirubin <0.2 0.0 - 1.2 mg/dL    Globulin 2.8 gm/dL    A/G Ratio 1.4 g/dL    BUN/Creatinine Ratio 22.5 7.0 - 25.0    Anion Gap 4.8 (L) 5.0 - 15.0 mmol/L    eGFR 98.7 >60.0 mL/min/1.73   Protime-INR    Collection Time: 04/30/24 10:55 PM    Specimen: Blood   Result Value Ref Range    Protime 13.7 11.7 - 14.2 Seconds    INR 1.03 0.90 - 1.10   aPTT    Collection Time: 04/30/24 10:55 PM    Specimen: Blood   Result Value Ref Range    PTT 24.2 22.7 - 35.4 seconds   Sedimentation Rate    Collection Time: 04/30/24 10:55 PM    Specimen: Blood   Result Value Ref Range    Sed Rate 25 0 - 30 mm/hr   C-reactive Protein    Collection Time: 04/30/24 10:55 PM    Specimen: Blood   Result Value Ref Range    C-Reactive Protein 4.34 (H) 0.00 - 0.50 mg/dL   Lactic Acid, Plasma    Collection Time: 04/30/24 10:55 PM    Specimen: Blood   Result Value Ref Range    Lactate 1.1 0.5 - 2.0 mmol/L   CBC Auto Differential    Collection Time: 04/30/24 10:55 PM    Specimen: Blood   Result Value Ref Range    WBC 8.39 3.40 - 10.80 10*3/mm3    RBC 4.24 3.77 - 5.28 10*6/mm3    Hemoglobin 12.1 12.0 -  15.9 g/dL    Hematocrit 36.9 34.0 - 46.6 %    MCV 87.0 79.0 - 97.0 fL    MCH 28.5 26.6 - 33.0 pg    MCHC 32.8 31.5 - 35.7 g/dL    RDW 13.3 12.3 - 15.4 %    RDW-SD 42.2 37.0 - 54.0 fl    MPV 9.5 6.0 - 12.0 fL    Platelets 235 140 - 450 10*3/mm3    Neutrophil % 46.6 42.7 - 76.0 %    Lymphocyte % 39.2 19.6 - 45.3 %    Monocyte % 10.0 5.0 - 12.0 %    Eosinophil % 3.1 0.3 - 6.2 %    Basophil % 0.6 0.0 - 1.5 %    Immature Grans % 0.5 0.0 - 0.5 %    Neutrophils, Absolute 3.91 1.70 - 7.00 10*3/mm3    Lymphocytes, Absolute 3.29 (H) 0.70 - 3.10 10*3/mm3    Monocytes, Absolute 0.84 0.10 - 0.90 10*3/mm3    Eosinophils, Absolute 0.26 0.00 - 0.40 10*3/mm3    Basophils, Absolute 0.05 0.00 - 0.20 10*3/mm3    Immature Grans, Absolute 0.04 0.00 - 0.05 10*3/mm3    nRBC 0.0 0.0 - 0.2 /100 WBC         RADIOLOGY  XR Foot 3+ View Right    Result Date: 4/30/2024  3 VIEWS RIGHT FOOT  HISTORY: Right foot pain  COMPARISON: None available.  FINDINGS: The patient is status post amputation at the proximal phalanx of the great toe. There is subluxation which is noted at the proximal interphalangeal joint of the second toe. No aggressive osseous abnormalities are seen. There is calcaneal spurring. Patient appears to have soft tissue swelling about the forefoot. No subcutaneous gas or retained foreign body is seen.      Patient is status post amputation through the base of the proximal phalanx of the great toe. No aggressive osseous abnormalities are seen, although MRI would be more sensitive.  This report was finalized on 4/30/2024 11:21 PM by Dr. Sirisha Castellanos M.D on Workstation: [id number]         MEDICATIONS GIVEN IN ER  Medications   vancomycin 1750 mg/500 mL 0.9% NS IVPB ([facility]) (1,750 mg Intravenous New Bag 5/1/24 0040)   sodium chloride 0.9 % flush 10 mL (10 mL Intravenous Given 5/1/24 0040)   sodium chloride 0.9 % flush 10 mL (has no administration in time range)   sodium chloride 0.9 % infusion 40 mL (has no administration in time  range)   dextrose (GLUTOSE) oral gel 15 g (has no administration in time range)   dextrose (D50W) (25 g/50 mL) IV injection 25 g (has no administration in time range)   glucagon (GLUCAGEN) injection 1 mg (has no administration in time range)   insulin lispro (HUMALOG/ADMELOG) injection 2-9 Units (has no administration in time range)   nitroglycerin (NITROSTAT) SL tablet 0.4 mg (has no administration in time range)   sennosides-docusate (PERICOLACE) 8.6-50 MG per tablet 2 tablet (has no administration in time range)     And   polyethylene glycol (MIRALAX) packet 17 g (has no administration in time range)     And   bisacodyl (DULCOLAX) EC tablet 5 mg (has no administration in time range)     And   bisacodyl (DULCOLAX) suppository 10 mg (has no administration in time range)   acetaminophen (TYLENOL) tablet 650 mg (has no administration in time range)     Or   acetaminophen (TYLENOL) 160 MG/5ML oral solution 650 mg (has no administration in time range)     Or   acetaminophen (TYLENOL) suppository 650 mg (has no administration in time range)   ondansetron ODT (ZOFRAN-ODT) disintegrating tablet 4 mg (has no administration in time range)     Or   ondansetron (ZOFRAN) injection 4 mg (has no administration in time range)   midazolam (VERSED) injection 1 mg (has no administration in time range)   cefepime 2000 mg IVPB in 100 mL NS (MBP) (has no administration in time range)   cefepime 2000 mg IVPB in 100 mL NS (MBP) (0 mg Intravenous Stopped 5/1/24 0039)           OUTPATIENT MEDICATION MANAGEMENT:  Current Facility-Administered Medications Ordered in Epic   Medication Dose Route Frequency Provider Last Rate Last Admin    acetaminophen (TYLENOL) tablet 650 mg  650 mg Oral Q4H PRN Rita Watts APRN        Or    acetaminophen (TYLENOL) 160 MG/5ML oral solution 650 mg  650 mg Oral Q4H PRN Rita Watts APRN        Or    acetaminophen (TYLENOL) suppository 650 mg  650 mg Rectal Q4H PRN Rita Watts  LAZARO        sennosides-docusate (PERICOLACE) 8.6-50 MG per tablet 2 tablet  2 tablet Oral BID PRN Rita Watts APRN        And    polyethylene glycol (MIRALAX) packet 17 g  17 g Oral Daily PRN Rita Watts APRN        And    bisacodyl (DULCOLAX) EC tablet 5 mg  5 mg Oral Daily PRN Rita Watts APRN        And    bisacodyl (DULCOLAX) suppository 10 mg  10 mg Rectal Daily PRN Rita Watts APRN        cefepime 2000 mg IVPB in 100 mL NS (MBP)  2,000 mg Intravenous Q24H Rita Watts APRN        dextrose (D50W) (25 g/50 mL) IV injection 25 g  25 g Intravenous Q15 Min PRN Rita Watts APRN        dextrose (GLUTOSE) oral gel 15 g  15 g Oral Q15 Min PRN Rita Watts APRN        glucagon (GLUCAGEN) injection 1 mg  1 mg Intramuscular Q15 Min PRN Rita Watts APRN        insulin lispro (HUMALOG/ADMELOG) injection 2-9 Units  2-9 Units Subcutaneous 4x Daily AC & at Bedtime Rita Watts APRN        midazolam (VERSED) injection 1 mg  1 mg Intravenous Once Rita Watts APRN        nitroglycerin (NITROSTAT) SL tablet 0.4 mg  0.4 mg Sublingual Q5 Min PRN Rita Watts APRN        ondansetron ODT (ZOFRAN-ODT) disintegrating tablet 4 mg  4 mg Oral Q6H PRN Rita Watts APRN        Or    ondansetron (ZOFRAN) injection 4 mg  4 mg Intravenous Q6H PRN Rita Watts APRN        sodium chloride 0.9 % flush 10 mL  10 mL Intravenous Q12H Rita Watts APRN   10 mL at 05/01/24 0040    sodium chloride 0.9 % flush 10 mL  10 mL Intravenous PRN Rita Watts APRN        sodium chloride 0.9 % infusion 40 mL  40 mL Intravenous PRN Rita Watts APRN        vancomycin 1750 mg/500 mL 0.9% NS IVPB ([facility])  20 mg/kg Intravenous Once Norma Olvera PA-C   1,750 mg at 05/01/24 0040     Current Outpatient Medications Ordered in Epic   Medication Sig Dispense Refill    acyclovir (ZOVIRAX)  400 MG tablet Take 1 tablet by mouth Daily As Needed.      albuterol sulfate  (90 Base) MCG/ACT inhaler Inhale 2 puffs Every 4 (Four) Hours As Needed.      budesonide-formoterol (SYMBICORT) 160-4.5 MCG/ACT inhaler Inhale 2 puffs 2 (Two) Times a Day. 10.2 g 12    CHLORHEXIDINE GLUCONATE CLOTH EX Apply  topically. USE AS DIRECTED      Cholecalciferol 50 MCG (2000 UT) tablet Take 1 tablet by mouth Daily. HOLDING FOR DOS      Cyanocobalamin 1000 MCG/ML kit Inject 1 mL into the appropriate muscle as directed by prescriber Every 30 (Thirty) Days.      docusate sodium 100 MG capsule Take 1 capsule by mouth 2 (Two) Times a Day.      gabapentin (NEURONTIN) 600 MG tablet Take 1 tablet by mouth 3 (Three) Times a Day.      Jardiance 10 MG tablet tablet Take 1 tablet by mouth Daily. Indications: Type 2 Diabetes      loratadine (CLARITIN) 10 MG tablet Take 1 tablet by mouth Daily.      metFORMIN (GLUCOPHAGE) 850 MG tablet Take 1 tablet by mouth 2 (Two) Times a Day With Meals. NOT CURRENTLY TAKING  Indications: Type 2 Diabetes      metoprolol tartrate (LOPRESSOR) 25 MG tablet Take 1 tablet by mouth 2 (Two) Times a Day.      nitroglycerin (NITROSTAT) 0.4 MG SL tablet Place 1 tablet under the tongue Every 5 (Five) Minutes As Needed.      Omega-3 Fatty Acids (fish oil) 1200 MG capsule capsule Take 1 capsule by mouth Daily With Breakfast. HOLDING FOR DOS      ondansetron (Zofran) 4 MG tablet Take 1 tablet by mouth Every 6 (Six) Hours As Needed for Nausea or Vomiting. 30 tablet 0    oxyCODONE-acetaminophen (PERCOCET) 5-325 MG per tablet Take 1 tablet by mouth Every 4 (Four) Hours As Needed for Severe Pain. 50 tablet 0    simvastatin (ZOCOR) 10 MG tablet Take 1 tablet by mouth Every Night.      tiotropium bromide monohydrate (SPIRIVA RESPIMAT) 2.5 MCG/ACT aerosol solution inhaler Inhale 2 puffs Daily. 4 g 3    TiZANidine (ZANAFLEX) 2 MG capsule Take 1 capsule by mouth 3 (Three) Times a Day.             PROGRESS, DATA ANALYSIS,  CONSULTS, AND MEDICAL DECISION MAKING  ORDERS PLACED DURING THIS VISIT:  Orders Placed This Encounter   Procedures    XR Foot 3+ View Right    MRI Foot Right Without Contrast    Comprehensive Metabolic Panel    Protime-INR    aPTT    Sedimentation Rate    C-reactive Protein    Lactic Acid, Plasma    CBC Auto Differential    Basic Metabolic Panel    CBC (No Diff)    Diet: Cardiac, Diabetic; Healthy Heart (2-3 Na+); Consistent Carbohydrate; Fluid Consistency: Thin (IDDSI 0)    Vital Signs    Intake & Output    Weigh Patient    Oral Care    Place Sequential Compression Device    Maintain Sequential Compression Device    Maintain IV Access    Telemetry - Place Orders & Notify Provider of Results When Patient Experiences Acute Chest Pain, Dysrhythmia or Respiratory Distress    May Be Off Telemetry for Tests    Continuous Pulse Oximetry    Up With Assistance    Daily Weights    Neurovascular Checks    Code Status and Medical Interventions:    [facility] (on-call MD unless specified) Details    Inpatient Podiatry Consult    PT Consult: Eval & Treat As Tolerated; Discharge Placement Assessment    Incentive Spirometry    Oxygen Therapy- Nasal Cannula; Titrate 1-6 LPM Per SpO2; 90 - 95%    POC Glucose 4x Daily Before Meals & at Bedtime    Wound Ostomy Eval & Treat    Insert Peripheral IV    Initiate Observation Status    CBC & Differential       All labs have been independently interpreted by me.  All radiology studies have been reviewed by me. All EKG's have been independently viewed and interpreted by me.  Discussion below represents my analysis of pertinent findings related to patient's condition, differential diagnosis, treatment plan and final disposition.    Differential diagnosis includes but is not limited to:   My diagnosis for lower extremity pain and injury includes but is not limited to hip fracture, femur fracture, hip dislocation, hip contusion, hip sprain, hip strain, pelvic fracture, ischio-tibial band pain,  ischio-tibial band bursitis, knee sprain, patella dislocation, knee dislocation, internal derangement of knee, fractures of the femur, tibia, fibula, ankle, foot and digits, ankle sprain, ankle dislocation, Lisfranc fracture, fracture dislocations of the digits, pulmonary embolism, claudication, peripheral vascular disease, gout, osteoarthritis, rheumatoid arthritis, bursitis, septic joint, poly-rheumatica, polyarthralgia and other inflammatory or infectious disease processes       ED Course:  ED Course as of 05/01/24 0124   Tue Apr 30, 2024   2531 I discussed the case with Dr. Hansen and they agree to evaluate the patient at the bedside.    [CC]   2323 C-Reactive Protein(!): 4.34 [CC]   2323 Lactate: 1.1 [CC]   2323 WBC: 8.39 [CC]   Wed May 01, 2024   0004 Spoke with Antoinette, DARELL with [facility].  Reviewed history, exam, results, treatments.  She agrees admit the patient to Dr. Dodge.      [CC]      ED Course User Index  [CC] Norma Olvera PA-C           AS OF 01:24 EDT VITALS:    BP - 133/75  HR - 84  TEMP - 97 °F (36.1 °C) (Tympanic)  O2 SATS - 98%      MDM:  Patient is a 58-year-old female with a history of diabetes and peripheral neuropathy presents emergency department with a wound between the second and third toes on the right foot.  On arrival here in the emergency department vitals are reassuring, she is afebrile.  On my exam the patient has macerated and ulcerated skin between the second and third toes on the right foot where the patient has had the toes cam taped together over the last few weeks.  Patient has an obvious subluxation of the second toe on the right foot which has been ongoing since the beginning of April.  Given the severe angulation of the toe I suspect that it is causing pressure against the other toe causing skin breakdown and ulceration.  Patient has a history of diabetes and has had a prior amputation on this foot.  I have concern about this toe also needing amputation if it is not treated  appropriately.  Labs are generally reassuring there is no obvious signs of osteomyelitis on x-ray today but I believe the patient would benefit from inpatient hospitalization, podiatry consultation, and IV antibiotics.  Patient is agreeable and is stable at time of admission.      COMPLEXITY OF CARE  The patient requires admission.        DIAGNOSIS  Final diagnoses:   Toe infection         DISPOSITION  ED Disposition       ED Disposition   Decision to Admit    Condition   --    Comment   Level of Care: Med/Surg [1]   Diagnosis: Toe infection [[id number]]   Admitting Physician: ELIZABETH ARGUELLO [[id number]]   Attending Physician: ELIZABETH ARGUELLO [[id number]]                        Please note that portions of this document were completed with a voice recognition program.    Note Disclaimer: At [facility], we believe that sharing information builds trust and better relationships. You are receiving this note because you recently visited [facility]. It is possible you will see health information before a provider has talked with you about it. This kind of information can be easy to misunderstand. To help you fully understand what it means for your health, we urge you to discuss this note with your provider.     Norma Olvera PA-C  05/01/24 0139      Electronically signed by Norma Olvera PA-C at 05/01/24 0139       Huyen Rowley, RN at 04/30/24 2214          Pt states that she broke her right second toe approx 1.5wks ago. Pt unsure of injury. Pt states that she now has a sore to the area. Pt has great toe amputation to that foot.    Electronically signed by Huyen Rowley, RN at 04/30/24 2215       Physician Progress Notes (last 48 hours)  Notes from 04/29/24 1523 through 05/01/24 1523   No notes of this type exist for this encounter.          Consult Notes (last 48 hours)        Kerri Clarke at 05/01/24 1439          Attempted visit with patient for AD consult. Patient was busy and will try  again tomorrow.     Electronically signed by Kerri Clarke at 05/01/24 1437       Turner Portillo DPM at 05/01/24 1058        Consult Orders    1. Inpatient Podiatry Consult [[id number]] ordered by Rita Watts APRN at 05/01/24 0123                 Podiatry Consult Note      Patient: Yenny Crawford Admit Date: 04/30/2024    Age: 58 y.o.   PCP: Kassy Antonio APRN    MRN: [id number]  Room: [facility]        Subjective     Chief Complaint     Chief Complaint   Patient presents with    Wound Check    Toe Pain        HPI     This is a 59yo F who presents with right 2nd digit toe pain and swelling. She saw her outside podiatrist yesterday who took Xrays and prescribed oral antibiotics. Patient states the swelling and pain worsened and she came to  ED. She has been admitted. Xray and Mri performed which show septic arthritis 2nd MTPJ and osteomyelitis. She has been receiving empiric antibiotics.She admits to currently smoking and having an extensive smoking history.     Past Medical History     Past Medical History:   Diagnosis Date    Arthritis     Asthma     COPD (chronic obstructive pulmonary disease)     Degenerative disc disease, lumbar     CHRONIC    Diabetes mellitus     GERD (gastroesophageal reflux disease)     Hyperlipidemia     Hypertension     Neuropathy     CONOR FEET    Osteoarthritis     LEFT KNEE    Positive colorectal cancer screening using Cologuard test     PVD (peripheral vascular disease)     Seasonal allergies     Sleep apnea     NO CURRENT DEVICE        Past Surgical History:   Procedure Laterality Date    APPENDECTOMY      ARTHROPLASTY Right     KNEE    ECTOPIC PREGNANCY      JOINT REPLACEMENT Bilateral     KNEE ARTHROPLASTY    TOTAL KNEE ARTHROPLASTY REVISION Left 10/02/2023    Procedure: TOTAL KNEE ARTHROPLASTY REVISION WITH CORI ROBOT;  Surgeon: Luis Ansari II, MD;  Location: [facility] OR [facility] [place];  Service: Robotics - Ortho;  Laterality: Left;    WISDOM TOOTH EXTRACTION           Allergies   Allergen Reactions    Latex Rash        Social History     Tobacco Use   Smoking Status Every Day    Current packs/day: 0.50    Average packs/day: 0.5 packs/day for 45.0 years (22.5 ttl pk-yrs)    Types: Cigarettes   Smokeless Tobacco Never        Objective   Physical Exam    Vitals:    05/01/24 0945   BP: 134/78   Pulse: 65   Resp: 18   Temp: 98.4 °F (36.9 °C)   SpO2: 97%        Dermatology: diffuse edema and erythema to the right forefoot. There is an ulceration lateral 2nd digit that is freely mobile and has exposed bone, minimal purulence but there is joint fluid. Third digit has superficial ulceration but no signs of deep extension    Vascular: DP and PT pulses palpable    Neurological: protective sensation and light touch diminished to the forefoot    Musckuloskeletal: 5/5 muscle strength to all compartments    Labs     Lab Results   Component Value Date    HGBA1C 5.80 (H) 09/29/2023    POCGLU 142 (H) 05/01/2024    SEDRATE 25 04/30/2024        CBC:      Lab 05/01/24  0459 04/30/24  2255   WBC 8.62 8.39   HEMOGLOBIN 13.0 12.1   HEMATOCRIT 39.0 36.9   PLATELETS 224 235   NEUTROS ABS  --  3.91   IMMATURE GRANS (ABS)  --  0.04   LYMPHS ABS  --  3.29*   MONOS ABS  --  0.84   EOS ABS  --  0.26   MCV 88.8 87.0          No results found for this or any previous visit.     MRI Foot Right Without Contrast  Narrative: MRI RIGHT FOREFOOT WITHOUT CONTRAST     HISTORY: Cellulitis. Evaluate for osteomyelitis.     TECHNIQUE: MRI includes axial T1, STIR as well as coronal T1, T2  fat-sat, and sagittal PD  fat-saturated sequences through the forefoot.     COMPARISON: Right foot x-rays 04/30/2024.     FINDINGS: There is soft tissue wound lateral to the 2nd PIP joint. There  is chronic arthritis at the 2nd PIP joint with remodeling and flattening  of the articular surfaces with pseudoarthrosis associated with medial  subluxation. As a result of this medial subluxation, the lateral aspect  of the head of the  2nd proximal phalanx is directed laterally and  approaches the skin surface with a pointed edge. There is marrow edema  with abnormal T1 hypointense and T2 hyperintense signal within the 2nd  proximal and middle phalanges suspected to represent osteomyelitis.     There is a cutaneous blister along the lateral margin of the 3rd toe  measuring 1.9 cm AP x 1.2 cm transverse x 0.6 cm in thickness. There is  also bone marrow T1 hypointense and T2 hyperintense signal within the  3rd middle and distal phalanges that is suspicious for osteomyelitis.     There has been great toe amputation at the level of the proximal  metaphysis 1st proximal phalanx. There is STIR hyperintense signal  within the base of the 1st proximal phalanx suspicious for osteomyelitis  though this could represent reactive edema. No clear cortical loss is  evident.     Generalized edema is present within subcutaneous fat about the forefoot  greatest involving the 2nd and 3rd toes. There is also muscular edema  and atrophy likely related to chronic neuropathy. Mild chronic arthritic  changes are present at the 1st MTP joint and at the tarsometatarsal  joints.      Impression: 1. 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 Chronic 2nd  PIP joint remodeling with medial subluxation and lateral soft tissue  wound.  2. Cutaneous blister lateral third toe with underlying bone marrow edema  within the 3rd middle and distal phalanges suspicious for osteomyelitis.  3. Previous great toe amputation at the base of the 1st proximal phalanx  which exhibits mild bone marrow edema due to reactive edema or mild  residual or recurrent osteomyelitis without cortical loss.     This report was finalized on 5/1/2024 9:10 AM by Dr. Gil Villafana M.D on Workstation: [id number]          Assessment/Plan     57yo F with 2nd MTPJ septic arthritis, osteomyelitis, right foot    -pt examined and  evaluated by myself  -labs reviewed, CRP elevated, other labs stable  -Xray and MRI reviewed; based off clinical findings, I agree with septic 2nd MTPJ and 2nd OM.   -I discussed surgical intervention which would be 2nd digit amputation of the right foot. Pt is agreeable and consentable  -Placed on OR schedule for tomorrow morning 0800  -NPO midnight, will place consent order      Turner Portillo DPM  Office: [phone]     Electronically signed by Turner Portillo DPM at 05/01/24 5849

## 2024-05-01 NOTE — PLAN OF CARE
Goal Outcome Evaluation:  Plan of Care Reviewed With: patient        Progress: no change  Outcome Evaluation: Pt admitted through ER with dx of cellulitis/infectionof right 2nd and 3rd digits. Hx of great toe amputation. Afebrile. VS stable. To have MRI. Podiatrist to see in consult. Picture was taken and wet-dry dsg placed

## 2024-05-01 NOTE — CONSULTS
Podiatry Consult Note      Patient: Yenny Crawford Admit Date: 04/30/2024    Age: 58 y.o.   PCP: Kassy Antonio APRN    MRN: [id number]  Room: [facility]        Subjective     Chief Complaint     Chief Complaint   Patient presents with    Wound Check    Toe Pain        HPI     This is a 57yo F who presents with right 2nd digit toe pain and swelling. She saw her outside podiatrist yesterday who took Xrays and prescribed oral antibiotics. Patient states the swelling and pain worsened and she came to  ED. She has been admitted. Xray and Mri performed which show septic arthritis 2nd MTPJ and osteomyelitis. She has been receiving empiric antibiotics.She admits to currently smoking and having an extensive smoking history.     Past Medical History     Past Medical History:   Diagnosis Date    Arthritis     Asthma     COPD (chronic obstructive pulmonary disease)     Degenerative disc disease, lumbar     CHRONIC    Diabetes mellitus     GERD (gastroesophageal reflux disease)     Hyperlipidemia     Hypertension     Neuropathy     CONOR FEET    Osteoarthritis     LEFT KNEE    Positive colorectal cancer screening using Cologuard test     PVD (peripheral vascular disease)     Seasonal allergies     Sleep apnea     NO CURRENT DEVICE        Past Surgical History:   Procedure Laterality Date    APPENDECTOMY      ARTHROPLASTY Right     KNEE    ECTOPIC PREGNANCY      JOINT REPLACEMENT Bilateral     KNEE ARTHROPLASTY    TOTAL KNEE ARTHROPLASTY REVISION Left 10/02/2023    Procedure: TOTAL KNEE ARTHROPLASTY REVISION WITH CORI ROBOT;  Surgeon: Luis Ansari II, MD;  Location: [facility] OR [facility] [place];  Service: Robotics - Ortho;  Laterality: Left;    WISDOM TOOTH EXTRACTION          Allergies   Allergen Reactions    Latex Rash        Social History     Tobacco Use   Smoking Status Every Day    Current packs/day: 0.50    Average packs/day: 0.5 packs/day for 45.0 years (22.5 ttl pk-yrs)    Types: Cigarettes   Smokeless Tobacco Never         Objective   Physical Exam    Vitals:    05/01/24 0945   BP: 134/78   Pulse: 65   Resp: 18   Temp: 98.4 °F (36.9 °C)   SpO2: 97%        Dermatology: diffuse edema and erythema to the right forefoot. There is an ulceration lateral 2nd digit that is freely mobile and has exposed bone, minimal purulence but there is joint fluid. Third digit has superficial ulceration but no signs of deep extension    Vascular: DP and PT pulses palpable    Neurological: protective sensation and light touch diminished to the forefoot    Musckuloskeletal: 5/5 muscle strength to all compartments    Labs     Lab Results   Component Value Date    HGBA1C 5.80 (H) 09/29/2023    POCGLU 142 (H) 05/01/2024    SEDRATE 25 04/30/2024        CBC:      Lab 05/01/24  0459 04/30/24  2255   WBC 8.62 8.39   HEMOGLOBIN 13.0 12.1   HEMATOCRIT 39.0 36.9   PLATELETS 224 235   NEUTROS ABS  --  3.91   IMMATURE GRANS (ABS)  --  0.04   LYMPHS ABS  --  3.29*   MONOS ABS  --  0.84   EOS ABS  --  0.26   MCV 88.8 87.0          No results found for this or any previous visit.     MRI Foot Right Without Contrast  Narrative: MRI RIGHT FOREFOOT WITHOUT CONTRAST     HISTORY: Cellulitis. Evaluate for osteomyelitis.     TECHNIQUE: MRI includes axial T1, STIR as well as coronal T1, T2  fat-sat, and sagittal PD  fat-saturated sequences through the forefoot.     COMPARISON: Right foot x-rays 04/30/2024.     FINDINGS: There is soft tissue wound lateral to the 2nd PIP joint. There  is chronic arthritis at the 2nd PIP joint with remodeling and flattening  of the articular surfaces with pseudoarthrosis associated with medial  subluxation. As a result of this medial subluxation, the lateral aspect  of the head of the 2nd proximal phalanx is directed laterally and  approaches the skin surface with a pointed edge. There is marrow edema  with abnormal T1 hypointense and T2 hyperintense signal within the 2nd  proximal and middle phalanges suspected to represent osteomyelitis.      There is a cutaneous blister along the lateral margin of the 3rd toe  measuring 1.9 cm AP x 1.2 cm transverse x 0.6 cm in thickness. There is  also bone marrow T1 hypointense and T2 hyperintense signal within the  3rd middle and distal phalanges that is suspicious for osteomyelitis.     There has been great toe amputation at the level of the proximal  metaphysis 1st proximal phalanx. There is STIR hyperintense signal  within the base of the 1st proximal phalanx suspicious for osteomyelitis  though this could represent reactive edema. No clear cortical loss is  evident.     Generalized edema is present within subcutaneous fat about the forefoot  greatest involving the 2nd and 3rd toes. There is also muscular edema  and atrophy likely related to chronic neuropathy. Mild chronic arthritic  changes are present at the 1st MTP joint and at the tarsometatarsal  joints.      Impression: 1. 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 Chronic 2nd  PIP joint remodeling with medial subluxation and lateral soft tissue  wound.  2. Cutaneous blister lateral third toe with underlying bone marrow edema  within the 3rd middle and distal phalanges suspicious for osteomyelitis.  3. Previous great toe amputation at the base of the 1st proximal phalanx  which exhibits mild bone marrow edema due to reactive edema or mild  residual or recurrent osteomyelitis without cortical loss.     This report was finalized on 5/1/2024 9:10 AM by Dr. Gil Villafana M.D on Workstation: [id number]          Assessment/Plan     59yo F with 2nd PIPJ septic arthritis, osteomyelitis, right foot    -pt examined and evaluated by myself  -labs reviewed, CRP elevated, other labs stable  -Xray and MRI reviewed; based off clinical findings, I agree with septic 2nd PIPJ and 2nd OM.   -I discussed surgical intervention which would be 2nd digit amputation of the right foot. Pt is  agreeable and consentable  -Placed on OR schedule for tomorrow morning 0800  -NPO midnight, will place consent order      Turner Portillo DPM  Office: [phone]

## 2024-05-01 NOTE — NURSING NOTE
CWON note: consult received for right toe wounds, photos reviewed. Podiatry has already been consulted, will defer care and management to podiatry.

## 2024-05-02 ENCOUNTER — ANESTHESIA EVENT (OUTPATIENT)
Dept: PERIOP | Facility: HOSPITAL | Age: 59
End: 2024-05-02
Payer: COMMERCIAL

## 2024-05-02 ENCOUNTER — ANESTHESIA (OUTPATIENT)
Dept: PERIOP | Facility: HOSPITAL | Age: 59
End: 2024-05-02
Payer: COMMERCIAL

## 2024-05-02 ENCOUNTER — APPOINTMENT (OUTPATIENT)
Dept: GENERAL RADIOLOGY | Facility: HOSPITAL | Age: 59
DRG: 617 | End: 2024-05-02
Payer: COMMERCIAL

## 2024-05-02 LAB
ANION GAP SERPL CALCULATED.3IONS-SCNC: 5 MMOL/L (ref 5–15)
BUN SERPL-MCNC: 15 MG/DL (ref 6–20)
BUN/CREAT SERPL: 25 (ref 7–25)
CALCIUM SPEC-SCNC: 9 MG/DL (ref 8.6–10.5)
CHLORIDE SERPL-SCNC: 104 MMOL/L (ref 98–107)
CO2 SERPL-SCNC: 30 MMOL/L (ref 22–29)
CREAT SERPL-MCNC: 0.6 MG/DL (ref 0.57–1)
DEPRECATED RDW RBC AUTO: 44.1 FL (ref 37–54)
EGFRCR SERPLBLD CKD-EPI 2021: 104.2 ML/MIN/1.73
ERYTHROCYTE [DISTWIDTH] IN BLOOD BY AUTOMATED COUNT: 13.7 % (ref 12.3–15.4)
GLUCOSE BLDC GLUCOMTR-MCNC: 116 MG/DL (ref 70–130)
GLUCOSE BLDC GLUCOMTR-MCNC: 118 MG/DL (ref 70–130)
GLUCOSE BLDC GLUCOMTR-MCNC: 134 MG/DL (ref 70–130)
GLUCOSE BLDC GLUCOMTR-MCNC: 167 MG/DL (ref 70–130)
GLUCOSE BLDC GLUCOMTR-MCNC: 182 MG/DL (ref 70–130)
GLUCOSE SERPL-MCNC: 113 MG/DL (ref 65–99)
HCT VFR BLD AUTO: 37.3 % (ref 34–46.6)
HGB BLD-MCNC: 12.2 G/DL (ref 12–15.9)
MCH RBC QN AUTO: 29 PG (ref 26.6–33)
MCHC RBC AUTO-ENTMCNC: 32.7 G/DL (ref 31.5–35.7)
MCV RBC AUTO: 88.8 FL (ref 79–97)
PLATELET # BLD AUTO: 226 10*3/MM3 (ref 140–450)
PMV BLD AUTO: 9 FL (ref 6–12)
POTASSIUM SERPL-SCNC: 5.2 MMOL/L (ref 3.5–5.2)
RBC # BLD AUTO: 4.2 10*6/MM3 (ref 3.77–5.28)
SODIUM SERPL-SCNC: 139 MMOL/L (ref 136–145)
VANCOMYCIN TROUGH SERPL-MCNC: 13.7 MCG/ML (ref 5–20)
WBC NRBC COR # BLD AUTO: 6.63 10*3/MM3 (ref 3.4–10.8)

## 2024-05-02 PROCEDURE — 73630 X-RAY EXAM OF FOOT: CPT

## 2024-05-02 PROCEDURE — 36415 COLL VENOUS BLD VENIPUNCTURE: CPT | Performed by: STUDENT IN AN ORGANIZED HEALTH CARE EDUCATION/TRAINING PROGRAM

## 2024-05-02 PROCEDURE — 25810000003 LACTATED RINGERS PER 1000 ML: Performed by: NURSE ANESTHETIST, CERTIFIED REGISTERED

## 2024-05-02 PROCEDURE — 85027 COMPLETE CBC AUTOMATED: CPT | Performed by: STUDENT IN AN ORGANIZED HEALTH CARE EDUCATION/TRAINING PROGRAM

## 2024-05-02 PROCEDURE — 88305 TISSUE EXAM BY PATHOLOGIST: CPT | Performed by: PODIATRIST

## 2024-05-02 PROCEDURE — 25010000002 BUPIVACAINE (PF) 0.5 % SOLUTION: Performed by: PODIATRIST

## 2024-05-02 PROCEDURE — 80202 ASSAY OF VANCOMYCIN: CPT | Performed by: PODIATRIST

## 2024-05-02 PROCEDURE — 25010000002 PROPOFOL 200 MG/20ML EMULSION: Performed by: NURSE ANESTHETIST, CERTIFIED REGISTERED

## 2024-05-02 PROCEDURE — 87075 CULTR BACTERIA EXCEPT BLOOD: CPT | Performed by: PODIATRIST

## 2024-05-02 PROCEDURE — 25810000003 SODIUM CHLORIDE 0.9 % SOLUTION 250 ML FLEX CONT: Performed by: PODIATRIST

## 2024-05-02 PROCEDURE — 0JXQ0ZB TRANSFER RIGHT FOOT SUBCUTANEOUS TISSUE AND FASCIA WITH SKIN AND SUBCUTANEOUS TISSUE, OPEN APPROACH: ICD-10-PCS | Performed by: PODIATRIST

## 2024-05-02 PROCEDURE — 99223 1ST HOSP IP/OBS HIGH 75: CPT | Performed by: INTERNAL MEDICINE

## 2024-05-02 PROCEDURE — 87205 SMEAR GRAM STAIN: CPT | Performed by: PODIATRIST

## 2024-05-02 PROCEDURE — 87176 TISSUE HOMOGENIZATION CULTR: CPT | Performed by: PODIATRIST

## 2024-05-02 PROCEDURE — 88311 DECALCIFY TISSUE: CPT | Performed by: PODIATRIST

## 2024-05-02 PROCEDURE — 87206 SMEAR FLUORESCENT/ACID STAI: CPT | Performed by: PODIATRIST

## 2024-05-02 PROCEDURE — 0Y6R0Z0 DETACHMENT AT RIGHT 2ND TOE, COMPLETE, OPEN APPROACH: ICD-10-PCS | Performed by: PODIATRIST

## 2024-05-02 PROCEDURE — 25010000002 LIDOCAINE 1 % SOLUTION: Performed by: PODIATRIST

## 2024-05-02 PROCEDURE — 25810000003 SODIUM CHLORIDE 0.9 % SOLUTION 250 ML FLEX CONT: Performed by: NURSE PRACTITIONER

## 2024-05-02 PROCEDURE — 25010000002 MIDAZOLAM PER 1 MG: Performed by: ANESTHESIOLOGY

## 2024-05-02 PROCEDURE — 63710000001 INSULIN LISPRO (HUMAN) PER 5 UNITS: Performed by: PODIATRIST

## 2024-05-02 PROCEDURE — 25010000002 CEFEPIME PER 500 MG: Performed by: STUDENT IN AN ORGANIZED HEALTH CARE EDUCATION/TRAINING PROGRAM

## 2024-05-02 PROCEDURE — 25010000002 VANCOMYCIN HCL 1.25 G RECONSTITUTED SOLUTION 1 EACH VIAL: Performed by: PODIATRIST

## 2024-05-02 PROCEDURE — 87102 FUNGUS ISOLATION CULTURE: CPT | Performed by: PODIATRIST

## 2024-05-02 PROCEDURE — 80048 BASIC METABOLIC PNL TOTAL CA: CPT | Performed by: STUDENT IN AN ORGANIZED HEALTH CARE EDUCATION/TRAINING PROGRAM

## 2024-05-02 PROCEDURE — 25010000002 CEFEPIME PER 500 MG: Performed by: PODIATRIST

## 2024-05-02 PROCEDURE — 87116 MYCOBACTERIA CULTURE: CPT | Performed by: PODIATRIST

## 2024-05-02 PROCEDURE — 87070 CULTURE OTHR SPECIMN AEROBIC: CPT | Performed by: PODIATRIST

## 2024-05-02 PROCEDURE — 82948 REAGENT STRIP/BLOOD GLUCOSE: CPT

## 2024-05-02 PROCEDURE — 25010000002 VANCOMYCIN HCL 1.25 G RECONSTITUTED SOLUTION 1 EACH VIAL: Performed by: NURSE PRACTITIONER

## 2024-05-02 RX ORDER — DROPERIDOL 2.5 MG/ML
0.62 INJECTION, SOLUTION INTRAMUSCULAR; INTRAVENOUS
Status: DISCONTINUED | OUTPATIENT
Start: 2024-05-02 | End: 2024-05-02 | Stop reason: HOSPADM

## 2024-05-02 RX ORDER — EPHEDRINE SULFATE 50 MG/ML
5 INJECTION, SOLUTION INTRAVENOUS ONCE AS NEEDED
Status: DISCONTINUED | OUTPATIENT
Start: 2024-05-02 | End: 2024-05-02 | Stop reason: HOSPADM

## 2024-05-02 RX ORDER — FLUMAZENIL 0.1 MG/ML
0.2 INJECTION INTRAVENOUS AS NEEDED
Status: DISCONTINUED | OUTPATIENT
Start: 2024-05-02 | End: 2024-05-02 | Stop reason: HOSPADM

## 2024-05-02 RX ORDER — FENTANYL CITRATE 50 UG/ML
25 INJECTION, SOLUTION INTRAMUSCULAR; INTRAVENOUS
Status: DISCONTINUED | OUTPATIENT
Start: 2024-05-02 | End: 2024-05-02 | Stop reason: HOSPADM

## 2024-05-02 RX ORDER — FAMOTIDINE 10 MG/ML
20 INJECTION, SOLUTION INTRAVENOUS ONCE
Status: COMPLETED | OUTPATIENT
Start: 2024-05-02 | End: 2024-05-02

## 2024-05-02 RX ORDER — SODIUM CHLORIDE, SODIUM LACTATE, POTASSIUM CHLORIDE, CALCIUM CHLORIDE 600; 310; 30; 20 MG/100ML; MG/100ML; MG/100ML; MG/100ML
INJECTION, SOLUTION INTRAVENOUS CONTINUOUS PRN
Status: DISCONTINUED | OUTPATIENT
Start: 2024-05-02 | End: 2024-05-02 | Stop reason: SURG

## 2024-05-02 RX ORDER — PROMETHAZINE HYDROCHLORIDE 25 MG/1
25 SUPPOSITORY RECTAL ONCE AS NEEDED
Status: DISCONTINUED | OUTPATIENT
Start: 2024-05-02 | End: 2024-05-02 | Stop reason: HOSPADM

## 2024-05-02 RX ORDER — DIPHENHYDRAMINE HYDROCHLORIDE 50 MG/ML
12.5 INJECTION INTRAMUSCULAR; INTRAVENOUS
Status: DISCONTINUED | OUTPATIENT
Start: 2024-05-02 | End: 2024-05-02 | Stop reason: HOSPADM

## 2024-05-02 RX ORDER — IPRATROPIUM BROMIDE AND ALBUTEROL SULFATE 2.5; .5 MG/3ML; MG/3ML
3 SOLUTION RESPIRATORY (INHALATION) ONCE AS NEEDED
Status: DISCONTINUED | OUTPATIENT
Start: 2024-05-02 | End: 2024-05-02 | Stop reason: HOSPADM

## 2024-05-02 RX ORDER — ONDANSETRON 2 MG/ML
4 INJECTION INTRAMUSCULAR; INTRAVENOUS ONCE AS NEEDED
Status: DISCONTINUED | OUTPATIENT
Start: 2024-05-02 | End: 2024-05-02 | Stop reason: HOSPADM

## 2024-05-02 RX ORDER — HYDROCODONE BITARTRATE AND ACETAMINOPHEN 5; 325 MG/1; MG/1
1 TABLET ORAL ONCE AS NEEDED
Status: DISCONTINUED | OUTPATIENT
Start: 2024-05-02 | End: 2024-05-02 | Stop reason: HOSPADM

## 2024-05-02 RX ORDER — HYDROCODONE BITARTRATE AND ACETAMINOPHEN 7.5; 325 MG/1; MG/1
1 TABLET ORAL EVERY 4 HOURS PRN
Status: DISCONTINUED | OUTPATIENT
Start: 2024-05-02 | End: 2024-05-02 | Stop reason: HOSPADM

## 2024-05-02 RX ORDER — LABETALOL HYDROCHLORIDE 5 MG/ML
5 INJECTION, SOLUTION INTRAVENOUS
Status: DISCONTINUED | OUTPATIENT
Start: 2024-05-02 | End: 2024-05-02 | Stop reason: HOSPADM

## 2024-05-02 RX ORDER — HYDROMORPHONE HYDROCHLORIDE 1 MG/ML
0.25 INJECTION, SOLUTION INTRAMUSCULAR; INTRAVENOUS; SUBCUTANEOUS
Status: DISCONTINUED | OUTPATIENT
Start: 2024-05-02 | End: 2024-05-02 | Stop reason: HOSPADM

## 2024-05-02 RX ORDER — PROPOFOL 10 MG/ML
INJECTION, EMULSION INTRAVENOUS AS NEEDED
Status: DISCONTINUED | OUTPATIENT
Start: 2024-05-02 | End: 2024-05-02 | Stop reason: SURG

## 2024-05-02 RX ORDER — PROMETHAZINE HYDROCHLORIDE 25 MG/1
25 TABLET ORAL ONCE AS NEEDED
Status: DISCONTINUED | OUTPATIENT
Start: 2024-05-02 | End: 2024-05-02 | Stop reason: HOSPADM

## 2024-05-02 RX ORDER — SODIUM CHLORIDE 0.9 % (FLUSH) 0.9 %
3 SYRINGE (ML) INJECTION EVERY 12 HOURS SCHEDULED
Status: DISCONTINUED | OUTPATIENT
Start: 2024-05-02 | End: 2024-05-02 | Stop reason: HOSPADM

## 2024-05-02 RX ORDER — LIDOCAINE HYDROCHLORIDE 10 MG/ML
0.5 INJECTION, SOLUTION INFILTRATION; PERINEURAL ONCE AS NEEDED
Status: DISCONTINUED | OUTPATIENT
Start: 2024-05-02 | End: 2024-05-02 | Stop reason: HOSPADM

## 2024-05-02 RX ORDER — FENTANYL CITRATE 50 UG/ML
50 INJECTION, SOLUTION INTRAMUSCULAR; INTRAVENOUS ONCE AS NEEDED
Status: DISCONTINUED | OUTPATIENT
Start: 2024-05-02 | End: 2024-05-02 | Stop reason: HOSPADM

## 2024-05-02 RX ORDER — LIDOCAINE HYDROCHLORIDE 20 MG/ML
INJECTION, SOLUTION INFILTRATION; PERINEURAL AS NEEDED
Status: DISCONTINUED | OUTPATIENT
Start: 2024-05-02 | End: 2024-05-02 | Stop reason: SURG

## 2024-05-02 RX ORDER — NALOXONE HCL 0.4 MG/ML
0.2 VIAL (ML) INJECTION AS NEEDED
Status: DISCONTINUED | OUTPATIENT
Start: 2024-05-02 | End: 2024-05-02 | Stop reason: HOSPADM

## 2024-05-02 RX ORDER — HYDRALAZINE HYDROCHLORIDE 20 MG/ML
5 INJECTION INTRAMUSCULAR; INTRAVENOUS
Status: DISCONTINUED | OUTPATIENT
Start: 2024-05-02 | End: 2024-05-02 | Stop reason: HOSPADM

## 2024-05-02 RX ORDER — SODIUM CHLORIDE 0.9 % (FLUSH) 0.9 %
3-10 SYRINGE (ML) INJECTION AS NEEDED
Status: DISCONTINUED | OUTPATIENT
Start: 2024-05-02 | End: 2024-05-02 | Stop reason: HOSPADM

## 2024-05-02 RX ORDER — LIDOCAINE HYDROCHLORIDE 10 MG/ML
INJECTION, SOLUTION INFILTRATION; PERINEURAL AS NEEDED
Status: DISCONTINUED | OUTPATIENT
Start: 2024-05-02 | End: 2024-05-02 | Stop reason: HOSPADM

## 2024-05-02 RX ORDER — BUPIVACAINE HYDROCHLORIDE 5 MG/ML
INJECTION, SOLUTION EPIDURAL; INTRACAUDAL AS NEEDED
Status: DISCONTINUED | OUTPATIENT
Start: 2024-05-02 | End: 2024-05-02 | Stop reason: HOSPADM

## 2024-05-02 RX ORDER — MIDAZOLAM HYDROCHLORIDE 1 MG/ML
1 INJECTION INTRAMUSCULAR; INTRAVENOUS
Status: DISCONTINUED | OUTPATIENT
Start: 2024-05-02 | End: 2024-05-02 | Stop reason: HOSPADM

## 2024-05-02 RX ORDER — SODIUM CHLORIDE, SODIUM LACTATE, POTASSIUM CHLORIDE, CALCIUM CHLORIDE 600; 310; 30; 20 MG/100ML; MG/100ML; MG/100ML; MG/100ML
9 INJECTION, SOLUTION INTRAVENOUS CONTINUOUS
Status: DISCONTINUED | OUTPATIENT
Start: 2024-05-02 | End: 2024-05-03 | Stop reason: HOSPADM

## 2024-05-02 RX ADMIN — MIDAZOLAM 1 MG: 1 INJECTION INTRAMUSCULAR; INTRAVENOUS at 07:40

## 2024-05-02 RX ADMIN — PROPOFOL 30 MG: 10 INJECTION, EMULSION INTRAVENOUS at 08:20

## 2024-05-02 RX ADMIN — METOPROLOL TARTRATE 25 MG: 25 TABLET, FILM COATED ORAL at 05:23

## 2024-05-02 RX ADMIN — GABAPENTIN 600 MG: 300 CAPSULE ORAL at 20:39

## 2024-05-02 RX ADMIN — SODIUM CHLORIDE, POTASSIUM CHLORIDE, SODIUM LACTATE AND CALCIUM CHLORIDE: 600; 310; 30; 20 INJECTION, SOLUTION INTRAVENOUS at 07:54

## 2024-05-02 RX ADMIN — PROPOFOL 80 MG: 10 INJECTION, EMULSION INTRAVENOUS at 08:01

## 2024-05-02 RX ADMIN — PROPOFOL 20 MG: 10 INJECTION, EMULSION INTRAVENOUS at 08:13

## 2024-05-02 RX ADMIN — OXYCODONE AND ACETAMINOPHEN 1 TABLET: 5; 325 TABLET ORAL at 05:26

## 2024-05-02 RX ADMIN — PROPOFOL 20 MG: 10 INJECTION, EMULSION INTRAVENOUS at 08:06

## 2024-05-02 RX ADMIN — CEFEPIME 2000 MG: 2 INJECTION, POWDER, FOR SOLUTION INTRAVENOUS at 10:24

## 2024-05-02 RX ADMIN — GABAPENTIN 600 MG: 300 CAPSULE ORAL at 10:24

## 2024-05-02 RX ADMIN — VANCOMYCIN HYDROCHLORIDE 1250 MG: 1.25 INJECTION, POWDER, LYOPHILIZED, FOR SOLUTION INTRAVENOUS at 05:36

## 2024-05-02 RX ADMIN — CEFEPIME 2000 MG: 2 INJECTION, POWDER, FOR SOLUTION INTRAVENOUS at 02:18

## 2024-05-02 RX ADMIN — OXYCODONE AND ACETAMINOPHEN 1 TABLET: 5; 325 TABLET ORAL at 20:37

## 2024-05-02 RX ADMIN — OXYCODONE AND ACETAMINOPHEN 1 TABLET: 5; 325 TABLET ORAL at 14:55

## 2024-05-02 RX ADMIN — GABAPENTIN 600 MG: 300 CAPSULE ORAL at 14:55

## 2024-05-02 RX ADMIN — VANCOMYCIN HYDROCHLORIDE 1250 MG: 1.25 INJECTION, POWDER, LYOPHILIZED, FOR SOLUTION INTRAVENOUS at 18:57

## 2024-05-02 RX ADMIN — OXYCODONE AND ACETAMINOPHEN 1 TABLET: 5; 325 TABLET ORAL at 10:25

## 2024-05-02 RX ADMIN — LIDOCAINE HYDROCHLORIDE 60 MG: 20 INJECTION, SOLUTION INFILTRATION; PERINEURAL at 08:01

## 2024-05-02 RX ADMIN — ATORVASTATIN CALCIUM 10 MG: 20 TABLET, FILM COATED ORAL at 10:24

## 2024-05-02 RX ADMIN — METOPROLOL TARTRATE 25 MG: 25 TABLET, FILM COATED ORAL at 20:37

## 2024-05-02 RX ADMIN — ACYCLOVIR 400 MG: 400 TABLET ORAL at 10:26

## 2024-05-02 RX ADMIN — INSULIN LISPRO 2 UNITS: 100 INJECTION, SOLUTION INTRAVENOUS; SUBCUTANEOUS at 17:17

## 2024-05-02 RX ADMIN — Medication 10 ML: at 20:31

## 2024-05-02 RX ADMIN — CEFEPIME 2000 MG: 2 INJECTION, POWDER, FOR SOLUTION INTRAVENOUS at 20:28

## 2024-05-02 RX ADMIN — FAMOTIDINE 20 MG: 10 INJECTION INTRAVENOUS at 07:40

## 2024-05-02 NOTE — PROGRESS NOTES
"[facility] Clinical Pharmacy Services: Vancomycin Monitoring Note    Yenny Crawford is a 58 y.o. female who is on day 3/5 of pharmacy to dose vancomycin for Skin and Soft Tissue.  Duration: will likely need a 4 weeks course of antibiotics at the time of discharge. IV versus p.o. to be determined.   Previous Vancomycin Dose:   1250 mg IV every  12  hours  Updated Cultures and Sensitivities: 5/1 WD cx in progress GPC       Vitals/Labs  Ht: 172.7 cm (68\"); Wt: 45.2 kg (99 lb 11.2 oz)   Temp Readings from Last 1 Encounters:   05/01/24 98.4 °F (36.9 °C) (Oral)     Estimated Creatinine Clearance: 72.9 mL/min (by C-G formula based on SCr of 0.6 mg/dL).      Lab Results   Component Value Date    [facility] 13.70 05/02/2024       Results from last 7 days   Lab Units 05/02/24  1118 05/01/24  0459 04/30/24  2255   CREATININE mg/dL 0.60 0.59 0.71   WBC 10*3/mm3 6.63 8.62 8.39     Assessment/Plan    Current Vancomycin Dose: 1250 mg IV every  12  hours; provides a predicted  mg/L.hr         trough = 13.7 mcg/ml  Next Level Date and Time: tbd  depending on duration and patient d/c  We will continue to monitor patient changes and renal function     Thank you for involving pharmacy in this patient's care. Please contact pharmacy with any questions or concerns.       Chelle Adams, [facility]  Clinical Pharmacist            "

## 2024-05-02 NOTE — OP NOTE
Operative Report    Patient: Yenny Crawford     Date: 05/02/24     MRN: [id number]       SURGEON: Turner Portillo III, DPM     ASSISTANT: None    PROCEDURE: Second digit amputation, right foot  Rearrangement flap, 4.0 cm x 2.0 cm, right foot    PRE-OPERATIVE DIAGNOSIS: Septic arthritis PIPJ, right second digit  Osteomyelitis, right second toe    POST-OPERATIVE DIAGNOSIS: Same    ANAESTHESIA: MAC    HEMOSTASIS: None    ESTIMATED BLOOD LOSS: 5 cc    SPECIMEN: PIPJ bone and soft tissue sent for microbiology  Second toe sent for pathology    IMPLANTS: None    COMPLICATIONS: None    INDICATION FOR PROCEDURE: Ms. Crawford is a 58-year-old female with diabetes and previous smoking history who presents with ulceration to the lateral aspect of the right second toe.  X-ray and MRI performed which showed septic arthritis and osteomyelitis.  The bone is sticking out through the lateral wound.  Patient is requiring surgical intervention for the problem at this time.    Consent was obtained pre-operatively. All questions were answered, risks versus benefits were discussed at length. No guarantees or assurances were given or implied.    PROCEDURE: Patient was brought to the operating room and placed on the operative table in supine position. A surgical timeout was performed and then patients name, date of birth, procedure and surgical site were all verified. A local block of 10 cc 1% lidocaine plain was injected to the surgical site.  No tourniquet was used for this procedure.  The right lower extremity was then scrubbed, prepped and draped in the usual sterile manner.     Attention was directed to the right lower extremity where there is noted to be open PIPJ on the lateral aspect of the wound.  A rongeur was taken and removed several pieces of loose bony fragments and soft tissue and sent for microbiology.  Next, second digit was visualized and noted to have medial and lateral maceration and necrosis.  This would not allow for  normal incision for primary closure.  At that time, it was decided for rearrangement flap full-thickness to be performed for primary closure.  The base of the flap started at the MTPJ and extended distally to the PIPJ of the second digit.  Incision was deepened straight to subcutaneous tissue with care being taken to retract all vital structures soft tissue was lightly handled the flap and the second digit was then disarticulated at the second MTPJ.  There is not noted to be any purulence.  Surgical site was then flushed with copious amounts of sterile saline.  As noted be adequate perfusion of the soft tissues.  The soft tissue rearrangement flap was then remodeled and noticed to have good skin apposition.  Deep tissue was reapproximated with 3-0 Vicryl and skin was reapproximated with simple sutures nylon.  The flap measured to be 4.0 cm x 2.0 cm full-thickness.  After the procedure, 5 cc of 0.5% Marcaine plain was injected to the surgical site.  A dry sterile dressing of Xeroform 4 x 4's Jatin cast padding and an was applied to the right lower extremity.  Patient tolerated the procedure and anesthesia well.  Patient was transferred from the operating room to the recovery room with vital signs stable and neurovascular status intact.    Turner Portillo III, DPM

## 2024-05-02 NOTE — PLAN OF CARE
Goal Outcome Evaluation:  Plan of Care Reviewed With: patient        Progress: no change  Outcome Evaluation: NPO after MN for surgery today for amputation of 2nd digit of right foot. Dx with osteomyelitis with MRI. She is on cefepime and vancomycin. States she has numbness and tingling in lower extremities but also has pain--she states the percocet doesn't help Dsg had come off and new NS wet-dry dsg placed to right foot. She has cast boot that she is using. Continue to monitor blood sugars

## 2024-05-02 NOTE — CONSULTS
"Referring Provider: Dr JOSE Valentine    Reason for Consultation: Right second toe osteomyelitis. S/p amputation this morning     History of present illness:  Yenny Crawford is a 58 y.o. with peripheral neuropathy who I am asked to evaluate and give opinion for \"Right second toe osteomyelitis. S/p amputation this morning.\" History is obtained from the patient and review of the old medical records which I summarize/synthesize as follows: She presented to the emergency room on the evening of 4/30 due to right second toe pain.  She says she recently fractured the toe and then developed a sore on the second and third toes.  She does already have a history of right great toe amputation back in March 2024.  She says that she was told all of the infection was removed with amputation so did not require antibiotics following the surgery.    This episode, no associated fever or chills.  She had a follow-up with her podiatrist yesterday and there were concerns for infection and she was prescribed cephalexin.  She presented to the ER due to worsening pain.    In the emergency room and since admission she has been afebrile.  Initial labs showed WBC 8, lactate 1.1, and CRP 4.3.  MRI of the right foot showed osteomyelitis of the second and third toes.  She was started on vancomycin and cefepime.  Podiatry was consulted.  They took her to the operating room today for second digit amputation on the right foot and rearrangement flap.  Operative cultures are pending.  She has a preoperative wound culture that shows GPC's on the Gram stain with culture \"in progress.\"        Past Medical History:   Diagnosis Date    Arthritis     Asthma     COPD (chronic obstructive pulmonary disease)     Degenerative disc disease, lumbar     CHRONIC    Diabetes mellitus     GERD (gastroesophageal reflux disease)     Hyperlipidemia     Hypertension     Neuropathy     CONOR FEET    Osteoarthritis     LEFT KNEE    Positive colorectal cancer screening using " Cologuard test     PVD (peripheral vascular disease)     Seasonal allergies     Sleep apnea     NO CURRENT DEVICE       Past Surgical History:   Procedure Laterality Date    APPENDECTOMY      ARTHROPLASTY Right     KNEE    ECTOPIC PREGNANCY      JOINT REPLACEMENT Bilateral     KNEE ARTHROPLASTY    TOTAL KNEE ARTHROPLASTY REVISION Left 10/02/2023    Procedure: TOTAL KNEE ARTHROPLASTY REVISION WITH CORI ROBOT;  Surgeon: Luis Ansari II, MD;  [place] [facility] [place] [facility] [place]  Service: Robotics - Ortho;  Laterality: Left;    WISDOM TOOTH EXTRACTION       Antibiotic allergies and intolerances:  None    Medications:    Current Facility-Administered Medications:     acetaminophen (TYLENOL) tablet 650 mg, 650 mg, Oral, Q4H PRN **OR** acetaminophen (TYLENOL) 160 MG/5ML oral solution 650 mg, 650 mg, Oral, Q4H PRN **OR** acetaminophen (TYLENOL) suppository 650 mg, 650 mg, Rectal, Q4H PRN, Bandar, Turner, DPM    acetaminophen (TYLENOL) tablet 650 mg, 650 mg, Oral, Q6H PRN, Bandar, Turner, DPM    acyclovir (ZOVIRAX) tablet 400 mg, 400 mg, Oral, Daily, Bandar, Turner, DPM, 400 mg at 05/02/24 1026    atorvastatin (LIPITOR) tablet 10 mg, 10 mg, Oral, Daily, Bandar, Turner, DPM, 10 mg at 05/02/24 1024    sennosides-docusate (PERICOLACE) 8.6-50 MG per tablet 2 tablet, 2 tablet, Oral, BID PRN **AND** polyethylene glycol (MIRALAX) packet 17 g, 17 g, Oral, Daily PRN **AND** bisacodyl (DULCOLAX) EC tablet 5 mg, 5 mg, Oral, Daily PRN **AND** bisacodyl (DULCOLAX) suppository 10 mg, 10 mg, Rectal, Daily PRN, Turner Portillo, DPM    cefepime 2000 mg IVPB in 100 mL NS (MBP), 2,000 mg, Intravenous, Q8H, Turner Portillo, DPM, 2,000 mg at 05/02/24 1024    dextrose (D50W) (25 g/50 mL) IV injection 25 g, 25 g, Intravenous, Q15 Min PRN, Turner Portillo, DPM    dextrose (GLUTOSE) oral gel 15 g, 15 g, Oral, Q15 Min PRN, Turner Portillo, FRAN    gabapentin (NEURONTIN) capsule 600 mg, 600 mg, Oral, TID, Turner Portillo DPM, 600 mg at  05/02/24 1024    glucagon (GLUCAGEN) injection 1 mg, 1 mg, Intramuscular, Q15 Min PRN, Turner Portillo, DPM    insulin lispro (HUMALOG/ADMELOG) injection 2-9 Units, 2-9 Units, Subcutaneous, 4x Daily AC & at Bedtime, Turner Portillo DPM, 2 Units at 05/01/24 1759    ipratropium-albuterol (DUO-NEB) nebulizer solution 3 mL, 3 mL, Nebulization, Q4H PRN, Turner Portillo, DPM    lactated ringers infusion, 9 mL/hr, Intravenous, Continuous, BandarTurner simon, DPM    metoprolol tartrate (LOPRESSOR) tablet 25 mg, 25 mg, Oral, BID, BandarTurner simon, DPM, 25 mg at 05/02/24 0523    nitroglycerin (NITROSTAT) SL tablet 0.4 mg, 0.4 mg, Sublingual, Q5 Min PRN, Turner Portillo, DPM    ondansetron ODT (ZOFRAN-ODT) disintegrating tablet 4 mg, 4 mg, Oral, Q6H PRN **OR** ondansetron (ZOFRAN) injection 4 mg, 4 mg, Intravenous, Q6H PRN, Turner Portillo, DPM    oxyCODONE-acetaminophen (PERCOCET) 5-325 MG per tablet 1 tablet, 1 tablet, Oral, Q4H PRN, Turner Portillo DPM, 1 tablet at 05/02/24 1025    Pharmacy to dose vancomycin, , Does not apply, Continuous PRN, Turner Portillo, DPM    sodium chloride 0.9 % flush 10 mL, 10 mL, Intravenous, Q12H, BandarTurner, DPM, 10 mL at 05/01/24 0907    sodium chloride 0.9 % flush 10 mL, 10 mL, Intravenous, PRN, Turner Portillo, DPM    sodium chloride 0.9 % infusion 40 mL, 40 mL, Intravenous, PRN, BandarTurner simon, DPM    Vancomycin HCl 1,250 mg in sodium chloride 0.9 % 250 mL VTB, 1,250 mg, Intravenous, Q12H, Turner Portillo, DPM, Last Rate: 200 mL/hr at 05/02/24 0536, 1,250 mg at 05/02/24 0536      Objective   Vital Signs   Temp:  [97 °F (36.1 °C)-98.8 °F (37.1 °C)] 97 °F (36.1 °C)  Heart Rate:  [55-97] 58  Resp:  [12-18] 16  BP: (101-129)/(55-83) 103/72    Physical Exam:   General: awake, alert, NAD, sitting up in chair eating lunch  Eyes: no scleral icterus  ENT: no thrush  Cardiovascular: Bradycardic  Respiratory: normal work of breathing on RA  GI: Abdomen is soft, not tender, + bowel  "sounds in all four quadrants  :  no Sharif catheter  MSK: Right foot in boot  Skin: No rashes  Neurological: Alert and oriented x 3  Psychiatric: Normal mood and affect   Vasc: PIV w/o erythema    Labs:     Lab Results   Component Value Date    WBC 8.62 05/01/2024    HGB 13.0 05/01/2024    HCT 39.0 05/01/2024    MCV 88.8 05/01/2024     05/01/2024       Lab Results   Component Value Date    GLUCOSE 106 (H) 05/01/2024    BUN 16 05/01/2024    CREATININE 0.59 05/01/2024    BCR 27.1 (H) 05/01/2024    CO2 24.9 05/01/2024    CALCIUM 9.2 05/01/2024    ALBUMIN 3.9 04/30/2024    LABIL2 1.2 09/22/2020    AST 14 04/30/2024    ALT 10 04/30/2024     Lab Results   Component Value Date    HGBA1C 5.80 (H) 09/29/2023     Lab Results   Component Value Date    CRP 4.34 (H) 04/30/2024     No results found for: \"VANCOPEAK\", \"VANCOTROUGH\", \"VANCORANDOM\"    Microbiology:  5/1 right toe wound culture: In progress  5/2 operative right toe culture: Pending    Radiology:  MRI right foot with chronic septic arthritis second PIP joint with surrounding osteomyelitis.  Cutaneous blister third toe and changes concerning for osteomyelitis.  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  Operative cultures are pending.  I will follow-up the results.  She has a preoperative wound culture that is \"in progress\", and I will follow-up that result as well.    I think it is reasonable to continue empiric vancomycin and cefepime while awaiting the culture results.  I think she will likely need a 4 weeks course of " antibiotics at the time of discharge.  IV versus p.o. to be determined.    While the patient is on vancomycin, vancomycin levels will be ordered and reviewed with dose adjustments made as needed. The patient will have a daily creatinine level checked while on vancomycin as part of monitoring this medication.     Check CRP in the a.m.    ID will follow.

## 2024-05-02 NOTE — ANESTHESIA POSTPROCEDURE EVALUATION
Patient: Yenny Crawford    Procedure Summary       Date: 05/02/24 Room / Location: [facility] OR [address] [place] [address] MAIN OR    Anesthesia Start: 0754 Anesthesia Stop: 0836    Procedure: SECOND AMPUTATION DIGIT, RIGHT FOOT (Right) Diagnosis:       Toe infection      (Toe infection [L08.9])    Surgeons: Turner Portillo DPM Provider: Madisyn Chen MD    Anesthesia Type: MAC ASA Status: 3            Anesthesia Type: MAC    Vitals  Vitals Value Taken Time   /71 05/02/24 0845   Temp 36.6 °C (97.8 °F) 05/02/24 0835   Pulse 54 05/02/24 0855   Resp 14 05/02/24 0845   SpO2 99 % 05/02/24 0855   Vitals shown include unfiled device data.        Post Anesthesia Care and Evaluation    Patient location during evaluation: bedside  Patient participation: complete - patient participated  Level of consciousness: awake  Pain management: adequate    Airway patency: patent  Anesthetic complications: No anesthetic complications  PONV Status: none  Cardiovascular status: acceptable  Respiratory status: acceptable  Hydration status: acceptable  Post Neuraxial Block status: Motor and sensory function returned to baseline

## 2024-05-02 NOTE — PLAN OF CARE
"Goal Outcome Evaluation:  Arrived from PACU at 1005, kerlix & coban dressing dry & intact, pt up & weight bearing with boot 5 minutes after arriving to unit, up frequently in room, reminded to keep foot elevated, medicated for pain x 2 with relief, up ad tavares, admitted she left the floor earlier to \"get some fresh air & only took 2-3 puffs of a cigarette\" reminded not to leave the unit and certainly not to smoke after just having surgery today, verbal understanding & says she won't do it again, talking on the phone frequently, voiding w/o difficulty  Plan of Care Reviewed With: patient                                           "

## 2024-05-02 NOTE — PROGRESS NOTES
Name: Yenny Crawford ADMIT: 2024   : 1965  PCP: Kassy Antonio APRN    MRN: [id number] LOS: 1 days   AGE/SEX: 58 y.o. female  ROOM: [facility] OR/MAIN OR     Subjective   Subjective   Patient seen in recovery.  Drowsy from anesthesia.  Right foot is wrapped.    Review of Systems  As above     Objective   Objective   Vital Signs  Temp:  [97 °F (36.1 °C)-98.8 °F (37.1 °C)] 97 °F (36.1 °C)  Heart Rate:  [55-97] 57  Resp:  [12-18] 14  BP: (101-129)/(55-83) 103/65  SpO2:  [92 %-100 %] 94 %  on  Flow (L/min):  [2] 2;   Device (Oxygen Therapy): room air  Body mass index is 15.16 kg/m².  Physical Exam  Constitutional:       General: She is not in acute distress.     Appearance: She is not ill-appearing.   Cardiovascular:      Rate and Rhythm: Normal rate and regular rhythm.   Pulmonary:      Effort: Pulmonary effort is normal. No respiratory distress.   Abdominal:      General: Abdomen is flat. There is no distension.      Tenderness: There is no abdominal tenderness.   Musculoskeletal:         General: No swelling or deformity. Normal range of motion.      Comments: Right foot wrapped   Skin:     General: Skin is warm and dry.   Neurological:      Mental Status: She is alert.      Comments: Drowsy         Results Review     I reviewed the patient's new clinical results.  Results from last 7 days   Lab Units 24  0459 24  2255   WBC 10*3/mm3 8.62 8.39   HEMOGLOBIN g/dL 13.0 12.1   PLATELETS 10*3/mm3 224 235     Results from last 7 days   Lab Units 24  0459 24  2255   SODIUM mmol/L 140 140   POTASSIUM mmol/L 4.3 4.5   CHLORIDE mmol/L 103 103   CO2 mmol/L 24.9 32.2*   BUN mg/dL 16 16   CREATININE mg/dL 0.59 0.71   GLUCOSE mg/dL 106* 123*   Estimated Creatinine Clearance: 74.2 mL/min (by C-G formula based on SCr of 0.59 mg/dL).  Results from last 7 days   Lab Units 24  2255   ALBUMIN g/dL 3.9   BILIRUBIN mg/dL <0.2   ALK PHOS U/L 104   AST (SGOT) U/L 14   ALT (SGPT) U/L 10  "    Results from last 7 days   Lab Units 05/01/24  0459 04/30/24  2255   CALCIUM mg/dL 9.2 9.6   ALBUMIN g/dL  --  3.9     Results from last 7 days   Lab Units 04/30/24  2255   LACTATE mmol/L 1.1   No results found for: \"COVID19\"  Glucose   Date/Time Value Ref Range Status   05/02/2024 0841 116 70 - 130 mg/dL Final   05/02/2024 0547 167 (H) 70 - 130 mg/dL Final   05/01/2024 2124 135 (H) 70 - 130 mg/dL Final   05/01/2024 1648 165 (H) 70 - 130 mg/dL Final   05/01/2024 1134 137 (H) 70 - 130 mg/dL Final   05/01/2024 0654 142 (H) 70 - 130 mg/dL Final       Doppler Ankle Brachial Index Single Level CAR    Right Conclusion: The right NEELIMA is normal. Normal digital pressures.    Left Conclusion: The left NEELIMA is normal. Normal digital pressures.  MRI Foot Right Without Contrast  Narrative: MRI RIGHT FOREFOOT WITHOUT CONTRAST     HISTORY: Cellulitis. Evaluate for osteomyelitis.     TECHNIQUE: MRI includes axial T1, STIR as well as coronal T1, T2  fat-sat, and sagittal PD  fat-saturated sequences through the forefoot.     COMPARISON: Right foot x-rays 04/30/2024.     FINDINGS: There is soft tissue wound lateral to the 2nd PIP joint. There  is chronic arthritis at the 2nd PIP joint with remodeling and flattening  of the articular surfaces with pseudoarthrosis associated with medial  subluxation. As a result of this medial subluxation, the lateral aspect  of the head of the 2nd proximal phalanx is directed laterally and  approaches the skin surface with a pointed edge. There is marrow edema  with abnormal T1 hypointense and T2 hyperintense signal within the 2nd  proximal and middle phalanges suspected to represent osteomyelitis.     There is a cutaneous blister along the lateral margin of the 3rd toe  measuring 1.9 cm AP x 1.2 cm transverse x 0.6 cm in thickness. There is  also bone marrow T1 hypointense and T2 hyperintense signal within the  3rd middle and distal phalanges that is suspicious for osteomyelitis.     There has been " great toe amputation at the level of the proximal  metaphysis 1st proximal phalanx. There is STIR hyperintense signal  within the base of the 1st proximal phalanx suspicious for osteomyelitis  though this could represent reactive edema. No clear cortical loss is  evident.     Generalized edema is present within subcutaneous fat about the forefoot  greatest involving the 2nd and 3rd toes. There is also muscular edema  and atrophy likely related to chronic neuropathy. Mild chronic arthritic  changes are present at the 1st MTP joint and at the tarsometatarsal  joints.      Impression: 1. 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 Chronic 2nd  PIP joint remodeling with medial subluxation and lateral soft tissue  wound.  2. Cutaneous blister lateral third toe with underlying bone marrow edema  within the 3rd middle and distal phalanges suspicious for osteomyelitis.  3. Previous great toe amputation at the base of the 1st proximal phalanx  which exhibits mild bone marrow edema due to reactive edema or mild  residual or recurrent osteomyelitis without cortical loss.     This report was finalized on 5/1/2024 9:10 AM by Dr. Gil Villafana M.D on Workstation: [id number]       I reviewed the patient's daily medications.  Scheduled Medications  acyclovir, 400 mg, Oral, Daily  atorvastatin, 10 mg, Oral, Daily  cefepime, 2,000 mg, Intravenous, Q8H  gabapentin, 600 mg, Oral, TID  [Transfer Hold] insulin lispro, 2-9 Units, Subcutaneous, 4x Daily AC & at Bedtime  metoprolol tartrate, 25 mg, Oral, BID  [Transfer Hold] sodium chloride, 10 mL, Intravenous, Q12H  vancomycin, 1,250 mg, Intravenous, Q12H    Infusions  lactated ringers, 9 mL/hr  Pharmacy to dose vancomycin,     Diet  Diet: Diabetic; Consistent Carbohydrate; Fluid Consistency: Thin (IDDSI 0)         I have personally reviewed:  [x]  Laboratory   []  Microbiology   [x]  Radiology   []   EKG/Telemetry   []  Cardiology/Vascular   []  Pathology   [x]  Records     Assessment/Plan     Active Hospital Problems    Diagnosis  POA    **Cellulitis of toe of right foot [L03.031]  Yes    Osteomyelitis of toe [M86.9]  Yes    Type 2 diabetes mellitus with hyperglycemia, without long-term current use of insulin [E11.65]  Yes    Tobacco abuse [Z72.0]  Yes    Peripheral neuropathy [G62.9]  Yes    COPD (chronic obstructive pulmonary disease) [J44.9]  Yes    CAD (coronary artery disease) [I25.10]  Yes    HTN (hypertension) [I10]  Yes    Chronic back pain [M54.9, G89.29]  Yes      Resolved Hospital Problems   No resolved problems to display.       58 y.o. female admitted with Cellulitis of toe of right foot.    Osteomyelitis of right second toe  History of right great toe amputation  -Podiatry consulted status post right second toe amputation on 5/2  -Continue vancomycin and cefepime, ID consulted, patient recommendations  -Pain management     Diabetes type 2 with peripheral neuropathy  -Hold home metformin and Jardiance  -Low-dose sliding scale for now, will titrate as needed  -Continue home gabapentin     Coronary artery disease  Hyperlipidemia  Hypertension  -Hold aspirin.  Continue statin, metoprolol     COPD  Nicotine use disorder    SCDs for DVT prophylaxis.  Full code.  Discussed with patient and nursing staff.  Anticipate discharge home with HH vs SNU facility in 1-2 days.    Expected Discharge Date: 5/3/2024; Expected Discharge Time:       Bello Valentine MD  [facility]  05/02/24  09:47 EDT

## 2024-05-02 NOTE — ANESTHESIA PREPROCEDURE EVALUATION
Anesthesia Evaluation                  Airway   Mallampati: II  TM distance: >3 FB  Neck ROM: full  No difficulty expected  Dental      Comment: Some missing upper teeth    Pulmonary    (+) a smoker Current, COPD, asthma,sleep apnea  Cardiovascular     Patient on routine beta blocker and Beta blocker given within 24 hours of surgery    (+) hypertension, CAD, PVD, hyperlipidemia      Neuro/Psych  (+) numbness, psychiatric history Bipolar  GI/Hepatic/Renal/Endo    (+) GERD, diabetes mellitus type 2    Musculoskeletal     (+) back pain  Abdominal    Substance History      OB/GYN          Other   arthritis,                 Anesthesia Plan    ASA 3     MAC       Anesthetic plan, risks, benefits, and alternatives have been provided, discussed and informed consent has been obtained with: patient.    CODE STATUS:    Code Status (Patient has no pulse and is not breathing): CPR (Attempt to Resuscitate)  Medical Interventions (Patient has pulse or is breathing): Full Support

## 2024-05-02 NOTE — ADDENDUM NOTE
Addendum  created 05/02/24 1252 by Madisyn Chen MD    Attestation recorded in Intraprocedure, Intraprocedure Attestations filed

## 2024-05-03 ENCOUNTER — READMISSION MANAGEMENT (OUTPATIENT)
Dept: CALL CENTER | Facility: HOSPITAL | Age: 59
End: 2024-05-03
Payer: COMMERCIAL

## 2024-05-03 VITALS
OXYGEN SATURATION: 96 % | SYSTOLIC BLOOD PRESSURE: 123 MMHG | HEART RATE: 66 BPM | RESPIRATION RATE: 18 BRPM | HEIGHT: 68 IN | WEIGHT: 220.24 LBS | DIASTOLIC BLOOD PRESSURE: 77 MMHG | BODY MASS INDEX: 33.38 KG/M2 | TEMPERATURE: 97.7 F

## 2024-05-03 LAB
ANION GAP SERPL CALCULATED.3IONS-SCNC: 11.5 MMOL/L (ref 5–15)
BACTERIA SPEC AEROBE CULT: ABNORMAL
BACTERIA SPEC AEROBE CULT: ABNORMAL
BUN SERPL-MCNC: 15 MG/DL (ref 6–20)
BUN/CREAT SERPL: 27.3 (ref 7–25)
CALCIUM SPEC-SCNC: 9 MG/DL (ref 8.6–10.5)
CHLORIDE SERPL-SCNC: 104 MMOL/L (ref 98–107)
CO2 SERPL-SCNC: 25.5 MMOL/L (ref 22–29)
CREAT SERPL-MCNC: 0.55 MG/DL (ref 0.57–1)
CRP SERPL-MCNC: 2.51 MG/DL (ref 0–0.5)
DEPRECATED RDW RBC AUTO: 44.3 FL (ref 37–54)
EGFRCR SERPLBLD CKD-EPI 2021: 106.4 ML/MIN/1.73
ERYTHROCYTE [DISTWIDTH] IN BLOOD BY AUTOMATED COUNT: 13.2 % (ref 12.3–15.4)
GLUCOSE BLDC GLUCOMTR-MCNC: 128 MG/DL (ref 70–130)
GLUCOSE BLDC GLUCOMTR-MCNC: 160 MG/DL (ref 70–130)
GLUCOSE SERPL-MCNC: 120 MG/DL (ref 65–99)
GRAM STN SPEC: ABNORMAL
GRAM STN SPEC: ABNORMAL
HCT VFR BLD AUTO: 38.5 % (ref 34–46.6)
HGB BLD-MCNC: 12.7 G/DL (ref 12–15.9)
MCH RBC QN AUTO: 29.5 PG (ref 26.6–33)
MCHC RBC AUTO-ENTMCNC: 33 G/DL (ref 31.5–35.7)
MCV RBC AUTO: 89.3 FL (ref 79–97)
NIGHT BLUE STAIN TISS: NORMAL
PLATELET # BLD AUTO: 244 10*3/MM3 (ref 140–450)
PMV BLD AUTO: 9.2 FL (ref 6–12)
POTASSIUM SERPL-SCNC: 4.7 MMOL/L (ref 3.5–5.2)
RBC # BLD AUTO: 4.31 10*6/MM3 (ref 3.77–5.28)
SODIUM SERPL-SCNC: 141 MMOL/L (ref 136–145)
WBC NRBC COR # BLD AUTO: 7.54 10*3/MM3 (ref 3.4–10.8)

## 2024-05-03 PROCEDURE — 25810000003 SODIUM CHLORIDE 0.9 % SOLUTION 250 ML FLEX CONT: Performed by: PODIATRIST

## 2024-05-03 PROCEDURE — 82948 REAGENT STRIP/BLOOD GLUCOSE: CPT

## 2024-05-03 PROCEDURE — 63710000001 ONDANSETRON ODT 4 MG TABLET DISPERSIBLE: Performed by: PODIATRIST

## 2024-05-03 PROCEDURE — 86140 C-REACTIVE PROTEIN: CPT | Performed by: INTERNAL MEDICINE

## 2024-05-03 PROCEDURE — 25010000002 CEFEPIME PER 500 MG: Performed by: PODIATRIST

## 2024-05-03 PROCEDURE — 99232 SBSQ HOSP IP/OBS MODERATE 35: CPT | Performed by: INTERNAL MEDICINE

## 2024-05-03 PROCEDURE — 85027 COMPLETE CBC AUTOMATED: CPT | Performed by: STUDENT IN AN ORGANIZED HEALTH CARE EDUCATION/TRAINING PROGRAM

## 2024-05-03 PROCEDURE — 80048 BASIC METABOLIC PNL TOTAL CA: CPT | Performed by: STUDENT IN AN ORGANIZED HEALTH CARE EDUCATION/TRAINING PROGRAM

## 2024-05-03 PROCEDURE — 25010000002 VANCOMYCIN HCL 1.25 G RECONSTITUTED SOLUTION 1 EACH VIAL: Performed by: PODIATRIST

## 2024-05-03 RX ORDER — VANCOMYCIN 2 GRAM/500 ML IN 0.9 % SODIUM CHLORIDE INTRAVENOUS
2000 EVERY 12 HOURS
Status: DISCONTINUED | OUTPATIENT
Start: 2024-05-03 | End: 2024-05-03

## 2024-05-03 RX ORDER — CEPHALEXIN 500 MG/1
1000 CAPSULE ORAL 3 TIMES DAILY
Qty: 162 CAPSULE | Refills: 0 | Status: SHIPPED | OUTPATIENT
Start: 2024-05-03 | End: 2024-05-30

## 2024-05-03 RX ORDER — CEPHALEXIN 500 MG/1
1000 CAPSULE ORAL 3 TIMES DAILY
Qty: 162 CAPSULE | Refills: 0 | Status: SHIPPED | OUTPATIENT
Start: 2024-05-03 | End: 2024-05-03

## 2024-05-03 RX ORDER — NICOTINE 21 MG/24HR
1 PATCH, TRANSDERMAL 24 HOURS TRANSDERMAL
Status: DISCONTINUED | OUTPATIENT
Start: 2024-05-03 | End: 2024-05-03 | Stop reason: HOSPADM

## 2024-05-03 RX ADMIN — ACYCLOVIR 400 MG: 400 TABLET ORAL at 08:40

## 2024-05-03 RX ADMIN — ATORVASTATIN CALCIUM 10 MG: 20 TABLET, FILM COATED ORAL at 08:39

## 2024-05-03 RX ADMIN — NICOTINE 1 PATCH: 21 PATCH, EXTENDED RELEASE TRANSDERMAL at 10:09

## 2024-05-03 RX ADMIN — VANCOMYCIN HYDROCHLORIDE 1250 MG: 1.25 INJECTION, POWDER, LYOPHILIZED, FOR SOLUTION INTRAVENOUS at 08:45

## 2024-05-03 RX ADMIN — GABAPENTIN 600 MG: 300 CAPSULE ORAL at 08:39

## 2024-05-03 RX ADMIN — OXYCODONE AND ACETAMINOPHEN 1 TABLET: 5; 325 TABLET ORAL at 04:52

## 2024-05-03 RX ADMIN — METOPROLOL TARTRATE 25 MG: 25 TABLET, FILM COATED ORAL at 08:40

## 2024-05-03 RX ADMIN — CEFEPIME 2000 MG: 2 INJECTION, POWDER, FOR SOLUTION INTRAVENOUS at 03:25

## 2024-05-03 RX ADMIN — ONDANSETRON 4 MG: 4 TABLET, ORALLY DISINTEGRATING ORAL at 08:45

## 2024-05-03 NOTE — CONSULTS
Visit with patient X2 regarding AD. She has the paperwork and will have a conversation with her children about HCS.

## 2024-05-03 NOTE — SIGNIFICANT NOTE
05/03/24 0744   OTHER   Discipline physical therapist   Therapy Assessment/Plan (PT)   Criteria for Skilled Interventions Met (PT) no problems identified which require skilled intervention  (Nsg is doc pt is up adlib along with an ampac of 24.  No indication for acute PT needs at this time.  Will s/o.)

## 2024-05-03 NOTE — PROGRESS NOTES
Infectious diseases progress note    Chief complaint: Follow-up right second toe osteomyelitis. S/p amputation     Subjective: No acute events.  No fever.  No foot pain.  She has neuropathy.  Cultures growing oxacillin susceptible staph lugdunensis.  She is tolerating vancomycin and cefepime without rash or diarrhea.      Medications:    Current Facility-Administered Medications:     acetaminophen (TYLENOL) tablet 650 mg, 650 mg, Oral, Q4H PRN **OR** acetaminophen (TYLENOL) 160 MG/5ML oral solution 650 mg, 650 mg, Oral, Q4H PRN **OR** acetaminophen (TYLENOL) suppository 650 mg, 650 mg, Rectal, Q4H PRN, Turner Portillo, DPM    acetaminophen (TYLENOL) tablet 650 mg, 650 mg, Oral, Q6H PRN, Turner Portillo, DPM    acyclovir (ZOVIRAX) tablet 400 mg, 400 mg, Oral, Daily, BandarTurner simon, DPM, 400 mg at 05/03/24 0840    atorvastatin (LIPITOR) tablet 10 mg, 10 mg, Oral, Daily, Turner Portillo, DPM, 10 mg at 05/03/24 0839    sennosides-docusate (PERICOLACE) 8.6-50 MG per tablet 2 tablet, 2 tablet, Oral, BID PRN **AND** polyethylene glycol (MIRALAX) packet 17 g, 17 g, Oral, Daily PRN **AND** bisacodyl (DULCOLAX) EC tablet 5 mg, 5 mg, Oral, Daily PRN **AND** bisacodyl (DULCOLAX) suppository 10 mg, 10 mg, Rectal, Daily PRN, Turner Portillo, DPM    cefepime 2000 mg IVPB in 100 mL NS (MBP), 2,000 mg, Intravenous, Q8H, Turner Portillo DPM, 2,000 mg at 05/03/24 0325    dextrose (D50W) (25 g/50 mL) IV injection 25 g, 25 g, Intravenous, Q15 Min PRN, Turner Portillo, DPM    dextrose (GLUTOSE) oral gel 15 g, 15 g, Oral, Q15 Min PRN, Turner Portillo, DPM    gabapentin (NEURONTIN) capsule 600 mg, 600 mg, Oral, TID, Bandar, Turner, DPM, 600 mg at 05/03/24 0839    glucagon (GLUCAGEN) injection 1 mg, 1 mg, Intramuscular, Q15 Min PRN, Turner Portillo DPM    insulin lispro (HUMALOG/ADMELOG) injection 2-9 Units, 2-9 Units, Subcutaneous, 4x Daily AC & at Bedtime, Turner Portillo DPM, 2 Units at 05/02/24 4814     ipratropium-albuterol (DUO-NEB) nebulizer solution 3 mL, 3 mL, Nebulization, Q4H PRN, Bandar, Turner, DPM    lactated ringers infusion, 9 mL/hr, Intravenous, Continuous, Bandar, Turner, DPM    metoprolol tartrate (LOPRESSOR) tablet 25 mg, 25 mg, Oral, BID, Bandar, Turner, DPM, 25 mg at 05/03/24 0840    nicotine (NICODERM CQ) 21 MG/24HR patch 1 patch, 1 patch, Transdermal, Q24H, Bello Valentine MD    nitroglycerin (NITROSTAT) SL tablet 0.4 mg, 0.4 mg, Sublingual, Q5 Min PRN, BandarTurner simon, DPM    ondansetron ODT (ZOFRAN-ODT) disintegrating tablet 4 mg, 4 mg, Oral, Q6H PRN, 4 mg at 05/03/24 0845 **OR** ondansetron (ZOFRAN) injection 4 mg, 4 mg, Intravenous, Q6H PRN, Bandar, Turner, DPM    oxyCODONE-acetaminophen (PERCOCET) 5-325 MG per tablet 1 tablet, 1 tablet, Oral, Q4H PRN, Turner Portillo, DPM, 1 tablet at 05/03/24 0452    Pharmacy to dose vancomycin, , Does not apply, Continuous PRN, Bandar, Turner, DPM    sodium chloride 0.9 % flush 10 mL, 10 mL, Intravenous, Q12H, Bandar, Turner, DPM, 10 mL at 05/02/24 2031    sodium chloride 0.9 % flush 10 mL, 10 mL, Intravenous, PRN, Bandar, Turner, DPM    sodium chloride 0.9 % infusion 40 mL, 40 mL, Intravenous, PRN, Bandar, Turner, DPM    vancomycin IVPB 2000 mg in 0.9% Sodium Chloride 500 mL, 2,000 mg, Intravenous, Q12H, Rita Watts, LAZARO      Objective   Vital Signs   Temp:  [97 °F (36.1 °C)-99.3 °F (37.4 °C)] 97.7 °F (36.5 °C)  Heart Rate:  [65-80] 66  Resp:  [16-18] 18  BP: (110-152)/(67-82) 123/77    Physical Exam:   General: awake, alert, NAD, sitting up in bed  Eyes: no scleral icterus  Cardiovascular: Normal rate  Respiratory: normal work of breathing on ambient air  GI: Abdomen is soft, not tender or distended  :  no Sharif catheter  MSK: Right foot bandaged  Skin: No rashes  Neurological: Alert and oriented x 3  Psychiatric: Normal mood and affect   Vasc: PIV w/o erythema    Labs:   CBC, BMP, CRP, vancomycin trough, and wound  cultures reviewed today  Lab Results   Component Value Date    WBC 7.54 05/03/2024    HGB 12.7 05/03/2024    HCT 38.5 05/03/2024    MCV 89.3 05/03/2024     05/03/2024   .[facility]    Lab Results   Component Value Date    HGBA1C 5.80 (H) 09/29/2023     Lab Results   Component Value Date    CRP 2.51 (H) 05/03/2024     Lab Results   Component Value Date    VANCOTROUGH 13.70 05/02/2024       Microbiology:  5/1 right toe wound culture: Oxacillin susceptible staph lugdunensis (also susceptible to Bactrim; resistant to doxycycline and clindamycin)  5/2 operative right toe culture: Negative to date    Prior radiology:  MRI right foot with chronic septic arthritis second PIP joint with surrounding osteomyelitis.  Cutaneous blister third toe and changes concerning for osteomyelitis.  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  Operative cultures are negative today.  I will follow-up the results.  She has a preoperative wound culture that grew oxacillin susceptible Staph lugdunensis.    MRI also showed possible osteomyelitis of the third toe but this seems less likely clinically.    While in the hospital, I will treat her with cefazolin 2 g IV every 8 hours.  When ready for discharge, she can be changed to cephalexin 1 g p.o. every 8 hours for 4 weeks course with stop date of 5/30/2024.    She will have follow-up with podiatry.    D/w Dr Portillo and appreciate his interventions.     Thank you for allowing me to be involved in the care of this patient. Infectious diseases will sign off at  this time with antibiotics plan in place, but please call me at [phone] if any further ID questions or new ID concerns.

## 2024-05-03 NOTE — PLAN OF CARE
Goal Outcome Evaluation:  Plan of Care Reviewed With: patient        Progress: no change  Outcome Evaluation: Kerlix and Coban to surgical site on Right foot w/ shadowing, pt up ad tavares, PO pain meds given w/ relief, no c/o of nausea, voiding freely, room air, daily weight, neurochecks, morning labs ordered

## 2024-05-03 NOTE — PAYOR COMM NOTE
"Yenny Berg (58 y.o. Female)          DC SUMMARY FOR [id number]          Date of Birth   1965    Social Security Number       Address   [address] [facility] [address]    Home Phone   [phone]    MRN   [id number]       [facility]    Marital Status                               Admission Date   4/30/24    Admission Type   Emergency    Admitting Provider   Uriel Dodge MD    Attending Provider       Department, Room/Bed   [facility] [address] [place], P680/1       Discharge Date   5/3/2024    Discharge Disposition   Home or Self Care    Discharge Destination                                 Attending Provider: (none)   Allergies: Latex    Isolation: None   Infection: None   Code Status: CPR    Ht: 172.7 cm (68\")   Wt: 99.9 kg (220 lb 3.8 oz)    Admission Cmt: None   Principal Problem: Cellulitis of toe of right foot [L03.031]                   Active Insurance as of 4/30/2024       Primary Coverage       Payor Plan Insurance Group Employer/Plan Group    [facility] BY GOSIA [facility] BY GOSIA [id number]       Payor Plan Address Payor Plan Phone Number Payor Plan Fax Number Effective Dates    [address] [address]   1/1/2021 - None Entered    [facility] [id number]         Subscriber Name Subscriber Birth Date Member ID       YENNY BERG 1965 [id number]                     Emergency Contacts        (Rel.) Home Phone Work Phone Mobile Phone    HUSSEIN HARPER (Son) -- -- [phone]                 Operative/Procedure Notes (last 4 days)        Turner Portillo DPM at 05/02/24 0806          Operative Report    Patient: Yenny Berg     Date: 05/02/24     MRN: [id number]       SURGEON: Turner Portillo III, DPM     ASSISTANT: None    PROCEDURE: Second digit amputation, right foot  Rearrangement flap, 4.0 cm x 2.0 cm, right foot    PRE-OPERATIVE DIAGNOSIS: Septic arthritis PIPJ, right second digit  Osteomyelitis, right second " toe    POST-OPERATIVE DIAGNOSIS: Same    ANAESTHESIA: MAC    HEMOSTASIS: None    ESTIMATED BLOOD LOSS: 5 cc    SPECIMEN: PIPJ bone and soft tissue sent for microbiology  Second toe sent for pathology    IMPLANTS: None    COMPLICATIONS: None    INDICATION FOR PROCEDURE: Ms. Crawford is a 58-year-old female with diabetes and previous smoking history who presents with ulceration to the lateral aspect of the right second toe.  X-ray and MRI performed which showed septic arthritis and osteomyelitis.  The bone is sticking out through the lateral wound.  Patient is requiring surgical intervention for the problem at this time.    Consent was obtained pre-operatively. All questions were answered, risks versus benefits were discussed at length. No guarantees or assurances were given or implied.    PROCEDURE: Patient was brought to the operating room and placed on the operative table in supine position. A surgical timeout was performed and then patients name, date of birth, procedure and surgical site were all verified. A local block of 10 cc 1% lidocaine plain was injected to the surgical site.  No tourniquet was used for this procedure.  The right lower extremity was then scrubbed, prepped and draped in the usual sterile manner.     Attention was directed to the right lower extremity where there is noted to be open PIPJ on the lateral aspect of the wound.  A rongeur was taken and removed several pieces of loose bony fragments and soft tissue and sent for microbiology.  Next, second digit was visualized and noted to have medial and lateral maceration and necrosis.  This would not allow for normal incision for primary closure.  At that time, it was decided for rearrangement flap full-thickness to be performed for primary closure.  The base of the flap started at the MTPJ and extended distally to the PIPJ of the second digit.  Incision was deepened straight to subcutaneous tissue with care being taken to retract all vital  structures soft tissue was lightly handled the flap and the second digit was then disarticulated at the second MTPJ.  There is not noted to be any purulence.  Surgical site was then flushed with copious amounts of sterile saline.  As noted be adequate perfusion of the soft tissues.  The soft tissue rearrangement flap was then remodeled and noticed to have good skin apposition.  Deep tissue was reapproximated with 3-0 Vicryl and skin was reapproximated with simple sutures nylon.  The flap measured to be 4.0 cm x 2.0 cm full-thickness.  After the procedure, 5 cc of 0.5% Marcaine plain was injected to the surgical site.  A dry sterile dressing of Xeroform 4 x 4's Jatin cast padding and an was applied to the right lower extremity.  Patient tolerated the procedure and anesthesia well.  Patient was transferred from the operating room to the recovery room with vital signs stable and neurovascular status intact.    Turner Portillo III, DPM     Electronically signed by Turner Portillo DPM at 24 0834          Discharge Summary        Bello Valentine MD at 24 1112              Patient Name: Yenny Crawford  : 1965  MRN: [id number]    Date of Admission: 2024  Date of Discharge:  5/3/2024  Primary Care Physician: Kassy Antonio, LAZARO      Chief Complaint:   Wound Check and Toe Pain      Discharge Diagnoses     Active Hospital Problems    Diagnosis  POA    **Cellulitis of toe of right foot [L03.031]  Yes    Osteomyelitis of toe [M86.9]  Yes    Type 2 diabetes mellitus with hyperglycemia, without long-term current use of insulin [E11.65]  Yes    Tobacco abuse [Z72.0]  Yes    Peripheral neuropathy [G62.9]  Yes    COPD (chronic obstructive pulmonary disease) [J44.9]  Yes    CAD (coronary artery disease) [I25.10]  Yes    HTN (hypertension) [I10]  Yes    Chronic back pain [M54.9, G89.29]  Yes      Resolved Hospital Problems   No resolved problems to display.        Hospital Course     Ms. Crawford is  a 58 y.o. female with a history of diabetes type 2 with peripheral neuropathy, coronary artery disease, hyperlipidemia, hypertension, COPD, nicotine use disorder presenting with right foot pain, swelling, drainage found to have right second toe osteomyelitis and cellulitis.  Podiatry was consulted and performed a right second toe amputation.  Patient tolerated procedure well and has been able to ambulate with the boot.  Patient to follow-up with Dr. Portillo next week.  ID was consulted and plan for cephalexin 1 g every 8 hours until 5/30/2024.  Discussed with patient's importance of nicotine cessation.    At the time of discharge patient was told to take all medications as prescribed, keep all follow-up appointments, and call their doctor or return to the hospital with any worsening or concerning symptoms.    Day of Discharge     Subjective:  Patient resting comfortably in bed.  Up walking around the room.  Pain is well-controlled on her home Percocets.  No fevers or chills.  Tolerated all of her breakfast without any nausea or vomiting.    Review of Systems   Constitutional:  Negative for chills and fever.   Respiratory:  Negative for cough and shortness of breath.    Cardiovascular:  Negative for chest pain and leg swelling.   Gastrointestinal:  Negative for abdominal pain, diarrhea and nausea.       Physical Exam:  Temp:  [97.6 °F (36.4 °C)-99.3 °F (37.4 °C)] 97.7 °F (36.5 °C)  Heart Rate:  [65-76] 66  Resp:  [16-18] 18  BP: (110-152)/(67-82) 123/77  Body mass index is 33.49 kg/m².  Physical Exam  Constitutional:       General: She is not in acute distress.     Appearance: She is not ill-appearing.   Cardiovascular:      Rate and Rhythm: Normal rate and regular rhythm.   Pulmonary:      Effort: Pulmonary effort is normal. No respiratory distress.   Abdominal:      General: Abdomen is flat. There is no distension.      Tenderness: There is no abdominal tenderness.   Musculoskeletal:         General: No swelling or  deformity. Normal range of motion.      Comments: Right foot wrapped and in boot.   Skin:     General: Skin is warm and dry.   Neurological:      General: No focal deficit present.      Mental Status: She is alert. Mental status is at baseline.         Consultants     Consult Orders (all) (From admission, onward)       Start     Ordered    05/02/24 0850  Inpatient Infectious Diseases Consult  Once        Specialty:  Infectious Diseases  Provider:  Lm Ferreira MD    05/02/24 0850    05/01/24 0228  Inpatient Consult to Advance Care Planning  Once        Provider:  (Not yet assigned)    05/01/24 0227    05/01/24 0123  Inpatient Podiatry Consult  Once        Specialty:  Podiatry  Provider:  Turner Portillo DPM    05/01/24 0123    04/30/24 2345  [facility] (on-call MD unless specified) Details  Once        Specialty:  Hospitalist  Provider:  (Not yet assigned)    04/30/24 2344                  Procedures     Imaging Results (All)       Procedure Component Value Units Date/Time    XR Foot 3+ View Right [[id number]] Collected: 05/02/24 1001     Updated: 05/02/24 1005    Narrative:      3 VIEW RIGHT FOOT     HISTORY: Recent surgical amputation.     FINDINGS: The patient has had previous amputation of the first toe at  the level of the base of the first proximal phalanx. There has been  recent amputation of the second toe when compared to the study of  4/30/2024. No complicating features are seen.     This report was finalized on 5/2/2024 10:02 AM by Dr. Yang Hdz M.D  on Workstation: [id number]       MRI Foot Right Without Contrast [[id number]] Collected: 05/01/24 0849     Updated: 05/01/24 0913    Narrative:      MRI RIGHT FOREFOOT WITHOUT CONTRAST     HISTORY: Cellulitis. Evaluate for osteomyelitis.     TECHNIQUE: MRI includes axial T1, STIR as well as coronal T1, T2  fat-sat, and sagittal PD  fat-saturated sequences through the forefoot.     COMPARISON: Right foot x-rays 04/30/2024.     FINDINGS: There is  soft tissue wound lateral to the 2nd PIP joint. There  is chronic arthritis at the 2nd PIP joint with remodeling and flattening  of the articular surfaces with pseudoarthrosis associated with medial  subluxation. As a result of this medial subluxation, the lateral aspect  of the head of the 2nd proximal phalanx is directed laterally and  approaches the skin surface with a pointed edge. There is marrow edema  with abnormal T1 hypointense and T2 hyperintense signal within the 2nd  proximal and middle phalanges suspected to represent osteomyelitis.     There is a cutaneous blister along the lateral margin of the 3rd toe  measuring 1.9 cm AP x 1.2 cm transverse x 0.6 cm in thickness. There is  also bone marrow T1 hypointense and T2 hyperintense signal within the  3rd middle and distal phalanges that is suspicious for osteomyelitis.     There has been great toe amputation at the level of the proximal  metaphysis 1st proximal phalanx. There is STIR hyperintense signal  within the base of the 1st proximal phalanx suspicious for osteomyelitis  though this could represent reactive edema. No clear cortical loss is  evident.     Generalized edema is present within subcutaneous fat about the forefoot  greatest involving the 2nd and 3rd toes. There is also muscular edema  and atrophy likely related to chronic neuropathy. Mild chronic arthritic  changes are present at the 1st MTP joint and at the tarsometatarsal  joints.        Impression:      1. 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 Chronic 2nd  PIP joint remodeling with medial subluxation and lateral soft tissue  wound.  2. Cutaneous blister lateral third toe with underlying bone marrow edema  within the 3rd middle and distal phalanges suspicious for osteomyelitis.  3. Previous great toe amputation at the base of the 1st proximal phalanx  which exhibits mild bone marrow edema due to reactive  edema or mild  residual or recurrent osteomyelitis without cortical loss.     This report was finalized on 5/1/2024 9:10 AM by Dr. Gil Villafana M.D on Workstation: [id number]       XR Foot 3+ View Right [[id number]] Collected: 04/30/24 2319     Updated: 04/30/24 2324    Narrative:      3 VIEWS RIGHT FOOT     HISTORY: Right foot pain     COMPARISON: None available.     FINDINGS:  The patient is status post amputation at the proximal phalanx of the  great toe. There is subluxation which is noted at the proximal  interphalangeal joint of the second toe. No aggressive osseous  abnormalities are seen. There is calcaneal spurring. Patient appears to  have soft tissue swelling about the forefoot. No subcutaneous gas or  retained foreign body is seen.       Impression:      Patient is status post amputation through the base of the proximal  phalanx of the great toe. No aggressive osseous abnormalities are seen,  although MRI would be more sensitive.     This report was finalized on 4/30/2024 11:21 PM by Dr. Sirisha Castellanos M.D on Workstation: [id number]               Pertinent Labs     Results from last 7 days   Lab Units 05/03/24 0446 05/02/24  1118 05/01/24 0459 04/30/24  2255   WBC 10*3/mm3 7.54 6.63 8.62 8.39   HEMOGLOBIN g/dL 12.7 12.2 13.0 12.1   PLATELETS 10*3/mm3 244 226 224 235     Results from last 7 days   Lab Units 05/03/24 0446 05/02/24  1118 05/01/24 0459 04/30/24  2255   SODIUM mmol/L 141 139 140 140   POTASSIUM mmol/L 4.7 5.2 4.3 4.5   CHLORIDE mmol/L 104 104 103 103   CO2 mmol/L 25.5 30.0* 24.9 32.2*   BUN mg/dL 15 15 16 16   CREATININE mg/dL 0.55* 0.60 0.59 0.71   GLUCOSE mg/dL 120* 113* 106* 123*   Estimated Creatinine Clearance: 137.8 mL/min (A) (by C-G formula based on SCr of 0.55 mg/dL (L)).  Results from last 7 days   Lab Units 04/30/24  2255   ALBUMIN g/dL 3.9   BILIRUBIN mg/dL <0.2   ALK PHOS U/L 104   AST (SGOT) U/L 14   ALT (SGPT) U/L 10     Results from last 7 days   Lab Units  "05/03/24  0446 05/02/24  1118 05/01/24  0459 04/30/24  2255   CALCIUM mg/dL 9.0 9.0 9.2 9.6   ALBUMIN g/dL  --   --   --  3.9               Invalid input(s): \"LDLCALC\"  Results from last 7 days   Lab Units 05/01/24  0139   WOUNDCX  Scant growth (1+) Staphylococcus lugdunensis*  Rare Normal Skin Dunia       Test Results Pending at Discharge     Pending Labs       Order Current Status    AFB Culture - Surgical Site, Toe, Right In process    Anaerobic Culture - Surgical Site, Toe, Right In process    Fungus Culture - Surgical Site, Toe, Right In process    Tissue Pathology Exam In process    Tissue / Bone Culture - Surgical Site, Toe, Right Preliminary result            Discharge Details        Discharge Medications        New Medications        Instructions Start Date   cephalexin 500 MG capsule  Commonly known as: Keflex   1,000 mg, Oral, 3 times daily             Continue These Medications        Instructions Start Date   acyclovir 400 MG tablet  Commonly known as: ZOVIRAX   400 mg, Oral, Daily PRN      albuterol sulfate  (90 Base) MCG/ACT inhaler  Commonly known as: PROVENTIL HFA;VENTOLIN HFA;PROAIR HFA   2 puffs, Inhalation, Every 4 Hours PRN      CHLORHEXIDINE GLUCONATE CLOTH EX   Apply externally, USE AS DIRECTED      Cholecalciferol 50 MCG (2000 UT) tablet   2,000 Units, Oral, Daily, HOLDING FOR DOS      Cyanocobalamin 1000 MCG/ML kit   1,000 mcg, Intramuscular, Every 30 Days, STATES SHE IS DUE AFTER THE 1ST      fish oil 1200 MG capsule capsule   1,200 mg, Oral, Daily With Breakfast, HOLDING FOR DOS      gabapentin 600 MG tablet  Commonly known as: NEURONTIN   600 mg, Oral, 3 Times Daily      Jardiance 10 MG tablet tablet  Generic drug: empagliflozin   1 tablet, Oral, Daily      loratadine 10 MG tablet  Commonly known as: CLARITIN   10 mg, Oral, Daily      metFORMIN 850 MG tablet  Commonly known as: GLUCOPHAGE   1 tablet, Oral, 2 Times Daily With Meals, NOT CURRENTLY TAKING      metoprolol tartrate 25 " MG tablet  Commonly known as: LOPRESSOR   25 mg, Oral, 2 Times Daily      nitroglycerin 0.4 MG SL tablet  Commonly known as: NITROSTAT   0.4 mg, Sublingual, Every 5 Minutes PRN      ondansetron 4 MG tablet  Commonly known as: Zofran   4 mg, Oral, Every 6 Hours PRN      oxyCODONE-acetaminophen 5-325 MG per tablet  Commonly known as: PERCOCET   1 tablet, Oral, Every 4 Hours PRN      simvastatin 10 MG tablet  Commonly known as: ZOCOR   10 mg, Oral, Nightly      TiZANidine 2 MG capsule  Commonly known as: ZANAFLEX   2 mg, Oral, 3 Times Daily               Allergies   Allergen Reactions    Latex Rash         Discharge Disposition:  Home or Self Care    Discharge Diet:  Diet Order   Procedures    Diet: Diabetic; Consistent Carbohydrate; Fluid Consistency: Thin (IDDSI 0)       Discharge Activity:   As tolerated    CODE STATUS:    Code Status and Medical Interventions:   Ordered at: 05/01/24 0034     Code Status (Patient has no pulse and is not breathing):    CPR (Attempt to Resuscitate)     Medical Interventions (Patient has pulse or is breathing):    Full Support       No future appointments.   Follow-up Information       Kassy Antonio, APRN .    Specialty: Family Medicine  Contact information:  [address]  [place] [address]  [phone]                             Time Spent on Discharge:  Greater than 30 minutes      Bello Valentine MD  [facility]  05/03/24  11:12 EDT                Electronically signed by Bello Valentine MD at 05/03/24 7059

## 2024-05-03 NOTE — DISCHARGE SUMMARY
Patient Name: Yenny Crawford  : 1965  MRN: [id number]    Date of Admission: 2024  Date of Discharge:  5/3/2024  Primary Care Physician: Kassy Antonio APRN      Chief Complaint:   Wound Check and Toe Pain      Discharge Diagnoses     Active Hospital Problems    Diagnosis  POA    **Cellulitis of toe of right foot [L03.031]  Yes    Osteomyelitis of toe [M86.9]  Yes    Type 2 diabetes mellitus with hyperglycemia, without long-term current use of insulin [E11.65]  Yes    Tobacco abuse [Z72.0]  Yes    Peripheral neuropathy [G62.9]  Yes    COPD (chronic obstructive pulmonary disease) [J44.9]  Yes    CAD (coronary artery disease) [I25.10]  Yes    HTN (hypertension) [I10]  Yes    Chronic back pain [M54.9, G89.29]  Yes      Resolved Hospital Problems   No resolved problems to display.        Hospital Course     Ms. Crawford is a 58 y.o. female with a history of diabetes type 2 with peripheral neuropathy, coronary artery disease, hyperlipidemia, hypertension, COPD, nicotine use disorder presenting with right foot pain, swelling, drainage found to have right second toe osteomyelitis and cellulitis.  Podiatry was consulted and performed a right second toe amputation.  Patient tolerated procedure well and has been able to ambulate with the boot.  Patient to follow-up with Dr. Portillo next week.  ID was consulted and plan for cephalexin 1 g every 8 hours until 2024.  Discussed with patient's importance of nicotine cessation.    At the time of discharge patient was told to take all medications as prescribed, keep all follow-up appointments, and call their doctor or return to the hospital with any worsening or concerning symptoms.    Day of Discharge     Subjective:  Patient resting comfortably in bed.  Up walking around the room.  Pain is well-controlled on her home Percocets.  No fevers or chills.  Tolerated all of her breakfast without any nausea or vomiting.    Review of Systems   Constitutional:   Negative for chills and fever.   Respiratory:  Negative for cough and shortness of breath.    Cardiovascular:  Negative for chest pain and leg swelling.   Gastrointestinal:  Negative for abdominal pain, diarrhea and nausea.       Physical Exam:  Temp:  [97.6 °F (36.4 °C)-99.3 °F (37.4 °C)] 97.7 °F (36.5 °C)  Heart Rate:  [65-76] 66  Resp:  [16-18] 18  BP: (110-152)/(67-82) 123/77  Body mass index is 33.49 kg/m².  Physical Exam  Constitutional:       General: She is not in acute distress.     Appearance: She is not ill-appearing.   Cardiovascular:      Rate and Rhythm: Normal rate and regular rhythm.   Pulmonary:      Effort: Pulmonary effort is normal. No respiratory distress.   Abdominal:      General: Abdomen is flat. There is no distension.      Tenderness: There is no abdominal tenderness.   Musculoskeletal:         General: No swelling or deformity. Normal range of motion.      Comments: Right foot wrapped and in boot.   Skin:     General: Skin is warm and dry.   Neurological:      General: No focal deficit present.      Mental Status: She is alert. Mental status is at baseline.         Consultants     Consult Orders (all) (From admission, onward)       Start     Ordered    05/02/24 0850  Inpatient Infectious Diseases Consult  Once        Specialty:  Infectious Diseases  Provider:  Lm Ferreira MD    05/02/24 0850    05/01/24 0228  Inpatient Consult to Advance Care Planning  Once        Provider:  (Not yet assigned)    05/01/24 0227    05/01/24 0123  Inpatient Podiatry Consult  Once        Specialty:  Podiatry  Provider:  Turner Portillo DPM    05/01/24 0123    04/30/24 2345  A (on-call MD unless specified) Details  Once        Specialty:  Hospitalist  Provider:  (Not yet assigned)    04/30/24 2344                  Procedures     Imaging Results (All)       Procedure Component Value Units Date/Time    XR Foot 3+ View Right [[id number]] Collected: 05/02/24 1001     Updated: 05/02/24 1005     Narrative:      3 VIEW RIGHT FOOT     HISTORY: Recent surgical amputation.     FINDINGS: The patient has had previous amputation of the first toe at  the level of the base of the first proximal phalanx. There has been  recent amputation of the second toe when compared to the study of  4/30/2024. No complicating features are seen.     This report was finalized on 5/2/2024 10:02 AM by Dr. Yang Hdz M.D  on Workstation: [id number]       MRI Foot Right Without Contrast [[id number]] Collected: 05/01/24 0849     Updated: 05/01/24 0913    Narrative:      MRI RIGHT FOREFOOT WITHOUT CONTRAST     HISTORY: Cellulitis. Evaluate for osteomyelitis.     TECHNIQUE: MRI includes axial T1, STIR as well as coronal T1, T2  fat-sat, and sagittal PD  fat-saturated sequences through the forefoot.     COMPARISON: Right foot x-rays 04/30/2024.     FINDINGS: There is soft tissue wound lateral to the 2nd PIP joint. There  is chronic arthritis at the 2nd PIP joint with remodeling and flattening  of the articular surfaces with pseudoarthrosis associated with medial  subluxation. As a result of this medial subluxation, the lateral aspect  of the head of the 2nd proximal phalanx is directed laterally and  approaches the skin surface with a pointed edge. There is marrow edema  with abnormal T1 hypointense and T2 hyperintense signal within the 2nd  proximal and middle phalanges suspected to represent osteomyelitis.     There is a cutaneous blister along the lateral margin of the 3rd toe  measuring 1.9 cm AP x 1.2 cm transverse x 0.6 cm in thickness. There is  also bone marrow T1 hypointense and T2 hyperintense signal within the  3rd middle and distal phalanges that is suspicious for osteomyelitis.     There has been great toe amputation at the level of the proximal  metaphysis 1st proximal phalanx. There is STIR hyperintense signal  within the base of the 1st proximal phalanx suspicious for osteomyelitis  though this could represent reactive  edema. No clear cortical loss is  evident.     Generalized edema is present within subcutaneous fat about the forefoot  greatest involving the 2nd and 3rd toes. There is also muscular edema  and atrophy likely related to chronic neuropathy. Mild chronic arthritic  changes are present at the 1st MTP joint and at the tarsometatarsal  joints.        Impression:      1. 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 Chronic 2nd  PIP joint remodeling with medial subluxation and lateral soft tissue  wound.  2. Cutaneous blister lateral third toe with underlying bone marrow edema  within the 3rd middle and distal phalanges suspicious for osteomyelitis.  3. Previous great toe amputation at the base of the 1st proximal phalanx  which exhibits mild bone marrow edema due to reactive edema or mild  residual or recurrent osteomyelitis without cortical loss.     This report was finalized on 5/1/2024 9:10 AM by Dr. Gil Villafana M.D on Workstation: [id number]       XR Foot 3+ View Right [[id number]] Collected: 04/30/24 2319     Updated: 04/30/24 2324    Narrative:      3 VIEWS RIGHT FOOT     HISTORY: Right foot pain     COMPARISON: None available.     FINDINGS:  The patient is status post amputation at the proximal phalanx of the  great toe. There is subluxation which is noted at the proximal  interphalangeal joint of the second toe. No aggressive osseous  abnormalities are seen. There is calcaneal spurring. Patient appears to  have soft tissue swelling about the forefoot. No subcutaneous gas or  retained foreign body is seen.       Impression:      Patient is status post amputation through the base of the proximal  phalanx of the great toe. No aggressive osseous abnormalities are seen,  although MRI would be more sensitive.     This report was finalized on 4/30/2024 11:21 PM by Dr. Sirisha Castellanos M.D on Workstation: [id number]          "      Pertinent Labs     Results from last 7 days   Lab Units 05/03/24 0446 05/02/24 1118 05/01/24 0459 04/30/24  2255   WBC 10*3/mm3 7.54 6.63 8.62 8.39   HEMOGLOBIN g/dL 12.7 12.2 13.0 12.1   PLATELETS 10*3/mm3 244 226 224 235     Results from last 7 days   Lab Units 05/03/24 0446 05/02/24 1118 05/01/24 0459 04/30/24  2255   SODIUM mmol/L 141 139 140 140   POTASSIUM mmol/L 4.7 5.2 4.3 4.5   CHLORIDE mmol/L 104 104 103 103   CO2 mmol/L 25.5 30.0* 24.9 32.2*   BUN mg/dL 15 15 16 16   CREATININE mg/dL 0.55* 0.60 0.59 0.71   GLUCOSE mg/dL 120* 113* 106* 123*   Estimated Creatinine Clearance: 137.8 mL/min (A) (by C-G formula based on SCr of 0.55 mg/dL (L)).  Results from last 7 days   Lab Units 04/30/24  2255   ALBUMIN g/dL 3.9   BILIRUBIN mg/dL <0.2   ALK PHOS U/L 104   AST (SGOT) U/L 14   ALT (SGPT) U/L 10     Results from last 7 days   Lab Units 05/03/24 0446 05/02/24 1118 05/01/24 0459 04/30/24  2255   CALCIUM mg/dL 9.0 9.0 9.2 9.6   ALBUMIN g/dL  --   --   --  3.9               Invalid input(s): \"LDLCALC\"  Results from last 7 days   Lab Units 05/01/24  0139   WOUNDCX  Scant growth (1+) Staphylococcus lugdunensis*  Rare Normal Skin Dunia       Test Results Pending at Discharge     Pending Labs       Order Current Status    AFB Culture - Surgical Site, Toe, Right In process    Anaerobic Culture - Surgical Site, Toe, Right In process    Fungus Culture - Surgical Site, Toe, Right In process    Tissue Pathology Exam In process    Tissue / Bone Culture - Surgical Site, Toe, Right Preliminary result            Discharge Details        Discharge Medications        New Medications        Instructions Start Date   cephalexin 500 MG capsule  Commonly known as: Keflex   1,000 mg, Oral, 3 times daily             Continue These Medications        Instructions Start Date   acyclovir 400 MG tablet  Commonly known as: ZOVIRAX   400 mg, Oral, Daily PRN      albuterol sulfate  (90 Base) MCG/ACT inhaler  Commonly " known as: PROVENTIL HFA;VENTOLIN HFA;PROAIR HFA   2 puffs, Inhalation, Every 4 Hours PRN      CHLORHEXIDINE GLUCONATE CLOTH EX   Apply externally, USE AS DIRECTED      Cholecalciferol 50 MCG (2000 UT) tablet   2,000 Units, Oral, Daily, HOLDING FOR DOS      Cyanocobalamin 1000 MCG/ML kit   1,000 mcg, Intramuscular, Every 30 Days, STATES SHE IS DUE AFTER THE 1ST      fish oil 1200 MG capsule capsule   1,200 mg, Oral, Daily With Breakfast, HOLDING FOR DOS      gabapentin 600 MG tablet  Commonly known as: NEURONTIN   600 mg, Oral, 3 Times Daily      Jardiance 10 MG tablet tablet  Generic drug: empagliflozin   1 tablet, Oral, Daily      loratadine 10 MG tablet  Commonly known as: CLARITIN   10 mg, Oral, Daily      metFORMIN 850 MG tablet  Commonly known as: GLUCOPHAGE   1 tablet, Oral, 2 Times Daily With Meals, NOT CURRENTLY TAKING      metoprolol tartrate 25 MG tablet  Commonly known as: LOPRESSOR   25 mg, Oral, 2 Times Daily      nitroglycerin 0.4 MG SL tablet  Commonly known as: NITROSTAT   0.4 mg, Sublingual, Every 5 Minutes PRN      ondansetron 4 MG tablet  Commonly known as: Zofran   4 mg, Oral, Every 6 Hours PRN      oxyCODONE-acetaminophen 5-325 MG per tablet  Commonly known as: PERCOCET   1 tablet, Oral, Every 4 Hours PRN      simvastatin 10 MG tablet  Commonly known as: ZOCOR   10 mg, Oral, Nightly      TiZANidine 2 MG capsule  Commonly known as: ZANAFLEX   2 mg, Oral, 3 Times Daily               Allergies   Allergen Reactions    Latex Rash         Discharge Disposition:  Home or Self Care    Discharge Diet:  Diet Order   Procedures    Diet: Diabetic; Consistent Carbohydrate; Fluid Consistency: Thin (IDDSI 0)       Discharge Activity:   As tolerated    CODE STATUS:    Code Status and Medical Interventions:   Ordered at: 05/01/24 0034     Code Status (Patient has no pulse and is not breathing):    CPR (Attempt to Resuscitate)     Medical Interventions (Patient has pulse or is breathing):    Full Support       No  future appointments.   Follow-up Information       Kassy Antonio, APRN .    Specialty: Family Medicine  Contact information:  [address]  [place] [address]  [phone]                             Time Spent on Discharge:  Greater than 30 minutes      Bello Valentine MD  [facility]  05/03/24  11:12 EDT

## 2024-05-03 NOTE — PROGRESS NOTES
"[facility] Clinical Pharmacy Services: Vancomycin Monitoring Note    Yenny Crawford is a 58 y.o. female who is on day 4/5 of pharmacy to dose vancomycin for Bone and/or Joint Infection and Skin and Soft Tissue. Will likely be getting 4 weeks of antibiotics at discharge    Previous Vancomycin Dose:   1250 mg IV every  12  hours  Updated Cultures and Sensitivities: staph lugdunesis in wound cx from 5/1  Results from last 7 days   Lab Units 05/02/24  1118   VANCOMYCIN TR mcg/mL 13.70     Vitals/Labs  Ht: 172.7 cm (68\"); Wt: 99.9 kg (220 lb 3.8 oz)   Temp Readings from Last 1 Encounters:   05/03/24 97.6 °F (36.4 °C) (Oral)     Estimated Creatinine Clearance: 137.8 mL/min (A) (by C-G formula based on SCr of 0.55 mg/dL (L)).     Results from last 7 days   Lab Units 05/03/24  0446 05/02/24  1118 05/01/24  0459   CREATININE mg/dL 0.55* 0.60 0.59   WBC 10*3/mm3 7.54 6.63 8.62     Assessment/Plan    Current Vancomycin Dose: 2000 mg IV every  12  hours; provides a predicted  mg/L.hr   Next Level Date and Time: Vanc Trough on 5/4 at 1730  We will continue to monitor patient changes and renal function     Thank you for involving pharmacy in this patient's care. Please contact pharmacy with any questions or concerns.       Bethany Babinski, PharmD  Clinical Pharmacist          "

## 2024-05-04 LAB
LAB AP CASE REPORT: NORMAL
PATH REPORT.FINAL DX SPEC: NORMAL
PATH REPORT.GROSS SPEC: NORMAL

## 2024-05-04 NOTE — OUTREACH NOTE
Prep Survey      Flowsheet Row Responses   [facility]? [facility]   Is LACE score < 7 ? No   Eligibility Readm Mgmt   Discharge diagnosis *Cellulitis of toe of right foot   Does the patient have one of the following disease processes/diagnoses(primary or secondary)? General Surgery   Does the patient have Home health ordered? No   Is there a DME ordered? No   Prep survey completed? Yes            REN MALDONADO - Registered Nurse

## 2024-05-05 LAB
BACTERIA SPEC AEROBE CULT: NORMAL
BACTERIA SPEC ANAEROBE CULT: NORMAL
GRAM STN SPEC: NORMAL
GRAM STN SPEC: NORMAL

## 2024-05-05 NOTE — CASE MANAGEMENT/SOCIAL WORK
Case Management Discharge Note      Final Note: home    Provided Post Acute Provider List?: Yes  Post Acute Provider List: Home Health  Provided Post Acute Provider Quality & Resource List?: Yes  Delivered To: Patient  Method of Delivery: In person    Selected Continued Care - Discharged on 5/3/2024 Admission date: 4/30/2024 - Discharge disposition: Home or Self Care      Destination    No services have been selected for the patient.                Durable Medical Equipment    No services have been selected for the patient.                Dialysis/Infusion    No services have been selected for the patient.                Home Medical Care    No services have been selected for the patient.                Therapy    No services have been selected for the patient.                Community Resources    No services have been selected for the patient.                Community & DME    No services have been selected for the patient.                    Transportation Services  Private: Car    Final Discharge Disposition Code: 01 - home or self-care

## 2024-05-07 LAB — BACTERIA SPEC ANAEROBE CULT: NORMAL

## 2024-05-09 ENCOUNTER — READMISSION MANAGEMENT (OUTPATIENT)
Dept: CALL CENTER | Facility: HOSPITAL | Age: 59
End: 2024-05-09
Payer: COMMERCIAL

## 2024-05-09 LAB
FUNGUS WND CULT: NORMAL
MYCOBACTERIUM SPEC CULT: NORMAL
NIGHT BLUE STAIN TISS: NORMAL

## 2024-05-16 LAB
FUNGUS WND CULT: NORMAL
MYCOBACTERIUM SPEC CULT: NORMAL
NIGHT BLUE STAIN TISS: NORMAL

## 2024-05-23 LAB
FUNGUS WND CULT: NORMAL
MYCOBACTERIUM SPEC CULT: NORMAL
NIGHT BLUE STAIN TISS: NORMAL

## 2024-05-30 LAB
FUNGUS WND CULT: NORMAL
MYCOBACTERIUM SPEC CULT: NORMAL
NIGHT BLUE STAIN TISS: NORMAL

## 2024-06-06 LAB
MYCOBACTERIUM SPEC CULT: NORMAL
NIGHT BLUE STAIN TISS: NORMAL

## 2024-06-13 LAB
MYCOBACTERIUM SPEC CULT: NORMAL
NIGHT BLUE STAIN TISS: NORMAL

## 2024-08-30 ENCOUNTER — HOSPITAL ENCOUNTER (EMERGENCY)
Facility: HOSPITAL | Age: 59
Discharge: HOME | End: 2024-08-31
Attending: STUDENT IN AN ORGANIZED HEALTH CARE EDUCATION/TRAINING PROGRAM
Payer: COMMERCIAL

## 2024-08-30 ENCOUNTER — APPOINTMENT (OUTPATIENT)
Dept: GENERAL RADIOLOGY | Facility: HOSPITAL | Age: 59
End: 2024-08-30
Payer: COMMERCIAL

## 2024-08-30 DIAGNOSIS — K20.90 ESOPHAGITIS: Primary | ICD-10-CM

## 2024-08-30 PROCEDURE — 71045 X-RAY EXAM CHEST 1 VIEW: CPT

## 2024-08-30 PROCEDURE — 93010 ELECTROCARDIOGRAM REPORT: CPT | Performed by: INTERNAL MEDICINE

## 2024-08-30 PROCEDURE — 93005 ELECTROCARDIOGRAM TRACING: CPT | Performed by: STUDENT IN AN ORGANIZED HEALTH CARE EDUCATION/TRAINING PROGRAM

## 2024-08-30 PROCEDURE — 99285 EMERGENCY DEPT VISIT HI MDM: CPT

## 2024-08-31 ENCOUNTER — APPOINTMENT (OUTPATIENT)
Dept: CT IMAGING | Facility: HOSPITAL | Age: 59
End: 2024-08-31
Payer: COMMERCIAL

## 2024-08-31 VITALS
TEMPERATURE: 97.9 F | RESPIRATION RATE: 18 BRPM | HEIGHT: 69 IN | HEART RATE: 52 BPM | BODY MASS INDEX: 32.14 KG/M2 | SYSTOLIC BLOOD PRESSURE: 102 MMHG | DIASTOLIC BLOOD PRESSURE: 69 MMHG | WEIGHT: 217 LBS | OXYGEN SATURATION: 95 %

## 2024-08-31 LAB
ALBUMIN SERPL-MCNC: 3.9 G/DL (ref 3.5–5.2)
ALBUMIN/GLOB SERPL: 1.4 G/DL
ALP SERPL-CCNC: 90 U/L (ref 39–117)
ALT SERPL W P-5'-P-CCNC: 10 U/L (ref 1–33)
ANION GAP SERPL CALCULATED.3IONS-SCNC: 10.1 MMOL/L (ref 5–15)
AST SERPL-CCNC: 13 U/L (ref 1–32)
BACTERIA UR QL AUTO: NORMAL /HPF
BASOPHILS # BLD AUTO: 0.04 10*3/MM3 (ref 0–0.2)
BASOPHILS NFR BLD AUTO: 0.5 % (ref 0–1.5)
BILIRUB SERPL-MCNC: 0.2 MG/DL (ref 0–1.2)
BILIRUB UR QL STRIP: NEGATIVE
BUN SERPL-MCNC: 12 MG/DL (ref 6–20)
BUN/CREAT SERPL: 15.6 (ref 7–25)
CALCIUM SPEC-SCNC: 9.5 MG/DL (ref 8.6–10.5)
CHLORIDE SERPL-SCNC: 101 MMOL/L (ref 98–107)
CLARITY UR: CLEAR
CO2 SERPL-SCNC: 25.9 MMOL/L (ref 22–29)
COLOR UR: YELLOW
CREAT SERPL-MCNC: 0.77 MG/DL (ref 0.57–1)
DEPRECATED RDW RBC AUTO: 42.7 FL (ref 37–54)
EGFRCR SERPLBLD CKD-EPI 2021: 89 ML/MIN/1.73
EOSINOPHIL # BLD AUTO: 0.26 10*3/MM3 (ref 0–0.4)
EOSINOPHIL NFR BLD AUTO: 3.2 % (ref 0.3–6.2)
ERYTHROCYTE [DISTWIDTH] IN BLOOD BY AUTOMATED COUNT: 13.1 % (ref 12.3–15.4)
GLOBULIN UR ELPH-MCNC: 2.8 GM/DL
GLUCOSE SERPL-MCNC: 141 MG/DL (ref 65–99)
GLUCOSE UR STRIP-MCNC: NEGATIVE MG/DL
HCT VFR BLD AUTO: 39.3 % (ref 34–46.6)
HGB BLD-MCNC: 13.1 G/DL (ref 12–15.9)
HGB UR QL STRIP.AUTO: NEGATIVE
HYALINE CASTS UR QL AUTO: NORMAL /LPF
IMM GRANULOCYTES # BLD AUTO: 0.04 10*3/MM3 (ref 0–0.05)
IMM GRANULOCYTES NFR BLD AUTO: 0.5 % (ref 0–0.5)
KETONES UR QL STRIP: NEGATIVE
LEUKOCYTE ESTERASE UR QL STRIP.AUTO: ABNORMAL
LIPASE SERPL-CCNC: 22 U/L (ref 13–60)
LYMPHOCYTES # BLD AUTO: 2.97 10*3/MM3 (ref 0.7–3.1)
LYMPHOCYTES NFR BLD AUTO: 36 % (ref 19.6–45.3)
MCH RBC QN AUTO: 29.6 PG (ref 26.6–33)
MCHC RBC AUTO-ENTMCNC: 33.3 G/DL (ref 31.5–35.7)
MCV RBC AUTO: 88.7 FL (ref 79–97)
MONOCYTES # BLD AUTO: 0.58 10*3/MM3 (ref 0.1–0.9)
MONOCYTES NFR BLD AUTO: 7 % (ref 5–12)
NEUTROPHILS NFR BLD AUTO: 4.36 10*3/MM3 (ref 1.7–7)
NEUTROPHILS NFR BLD AUTO: 52.8 % (ref 42.7–76)
NITRITE UR QL STRIP: NEGATIVE
NRBC BLD AUTO-RTO: 0 /100 WBC (ref 0–0.2)
NT-PROBNP SERPL-MCNC: 110 PG/ML (ref 0–900)
PH UR STRIP.AUTO: <=5 [PH] (ref 5–8)
PLATELET # BLD AUTO: 200 10*3/MM3 (ref 140–450)
PMV BLD AUTO: 9.2 FL (ref 6–12)
POTASSIUM SERPL-SCNC: 4.1 MMOL/L (ref 3.5–5.2)
PROT SERPL-MCNC: 6.7 G/DL (ref 6–8.5)
PROT UR QL STRIP: NEGATIVE
QT INTERVAL: 427 MS
QTC INTERVAL: 401 MS
RBC # BLD AUTO: 4.43 10*6/MM3 (ref 3.77–5.28)
RBC # UR STRIP: NORMAL /HPF
REF LAB TEST METHOD: NORMAL
SODIUM SERPL-SCNC: 137 MMOL/L (ref 136–145)
SP GR UR STRIP: 1.02 (ref 1–1.03)
SQUAMOUS #/AREA URNS HPF: NORMAL /HPF
TROPONIN T SERPL HS-MCNC: <6 NG/L
UROBILINOGEN UR QL STRIP: ABNORMAL
WBC # UR STRIP: NORMAL /HPF
WBC NRBC COR # BLD AUTO: 8.25 10*3/MM3 (ref 3.4–10.8)

## 2024-08-31 PROCEDURE — 80053 COMPREHEN METABOLIC PANEL: CPT | Performed by: STUDENT IN AN ORGANIZED HEALTH CARE EDUCATION/TRAINING PROGRAM

## 2024-08-31 PROCEDURE — 96374 THER/PROPH/DIAG INJ IV PUSH: CPT

## 2024-08-31 PROCEDURE — 85025 COMPLETE CBC W/AUTO DIFF WBC: CPT | Performed by: STUDENT IN AN ORGANIZED HEALTH CARE EDUCATION/TRAINING PROGRAM

## 2024-08-31 PROCEDURE — 25510000001 IOPAMIDOL 61 % SOLUTION: Performed by: STUDENT IN AN ORGANIZED HEALTH CARE EDUCATION/TRAINING PROGRAM

## 2024-08-31 PROCEDURE — 83880 ASSAY OF NATRIURETIC PEPTIDE: CPT | Performed by: STUDENT IN AN ORGANIZED HEALTH CARE EDUCATION/TRAINING PROGRAM

## 2024-08-31 PROCEDURE — 83690 ASSAY OF LIPASE: CPT | Performed by: STUDENT IN AN ORGANIZED HEALTH CARE EDUCATION/TRAINING PROGRAM

## 2024-08-31 PROCEDURE — 81001 URINALYSIS AUTO W/SCOPE: CPT | Performed by: STUDENT IN AN ORGANIZED HEALTH CARE EDUCATION/TRAINING PROGRAM

## 2024-08-31 PROCEDURE — 25010000002 METOCLOPRAMIDE PER 10 MG: Performed by: STUDENT IN AN ORGANIZED HEALTH CARE EDUCATION/TRAINING PROGRAM

## 2024-08-31 PROCEDURE — 74177 CT ABD & PELVIS W/CONTRAST: CPT

## 2024-08-31 PROCEDURE — 84484 ASSAY OF TROPONIN QUANT: CPT | Performed by: STUDENT IN AN ORGANIZED HEALTH CARE EDUCATION/TRAINING PROGRAM

## 2024-08-31 PROCEDURE — 96375 TX/PRO/DX INJ NEW DRUG ADDON: CPT

## 2024-08-31 RX ORDER — ONDANSETRON 4 MG/1
4 TABLET, ORALLY DISINTEGRATING ORAL EVERY 8 HOURS PRN
Qty: 15 TABLET | Refills: 0 | Status: SHIPPED | OUTPATIENT
Start: 2024-08-31

## 2024-08-31 RX ORDER — IOPAMIDOL 612 MG/ML
100 INJECTION, SOLUTION INTRAVASCULAR
Status: COMPLETED | OUTPATIENT
Start: 2024-08-31 | End: 2024-08-31

## 2024-08-31 RX ORDER — PANTOPRAZOLE SODIUM 40 MG/10ML
40 INJECTION, POWDER, LYOPHILIZED, FOR SOLUTION INTRAVENOUS ONCE
Status: COMPLETED | OUTPATIENT
Start: 2024-08-31 | End: 2024-08-31

## 2024-08-31 RX ORDER — METOCLOPRAMIDE HYDROCHLORIDE 5 MG/ML
10 INJECTION INTRAMUSCULAR; INTRAVENOUS ONCE
Status: COMPLETED | OUTPATIENT
Start: 2024-08-31 | End: 2024-08-31

## 2024-08-31 RX ADMIN — METOCLOPRAMIDE 10 MG: 5 INJECTION, SOLUTION INTRAMUSCULAR; INTRAVENOUS at 03:04

## 2024-08-31 RX ADMIN — PANTOPRAZOLE SODIUM 40 MG: 40 INJECTION, POWDER, FOR SOLUTION INTRAVENOUS at 03:05

## 2024-08-31 RX ADMIN — IOPAMIDOL 85 ML: 612 INJECTION, SOLUTION INTRAVENOUS at 03:33

## 2024-08-31 NOTE — ED NOTES
Pt arrived to ED via ems. EMS reports the patient complained of Generalized abdominal pain x3 days. Pt reports mucous consistency to stool. Pt denies any N/V.

## 2024-08-31 NOTE — ED PROVIDER NOTES
EMERGENCY DEPARTMENT ENCOUNTER    Room Number:  20/20  PCP: Kassy Antonio APRN  History obtained from: Patient      HPI:  Chief Complaint: Abdominal pain  A complete HPI/ROS/PMH/PSH/SH/FH are unobtainable due to: N/A  Context: Yenny Crawford is a 59 y.o. female who presents to the ED c/o abdominal pain.  Feels it in her upper abdomen and radiates up into her chest.  No associated nausea and vomiting.  No fever.  No shortness of breath.  No other recent illness.  Patient drowsy during our conversation.            PAST MEDICAL HISTORY  Active Ambulatory Problems     Diagnosis Date Noted    Status post knee replacement 10/02/2023    COPD (chronic obstructive pulmonary disease) 10/03/2023    CAD (coronary artery disease) 10/03/2023    Tobacco abuse 10/03/2023    HTN (hypertension) 10/03/2023    Type 2 diabetes mellitus with hyperglycemia, without long-term current use of insulin 10/03/2023    Peripheral neuropathy 10/03/2023    Chronic back pain 10/03/2023    Cellulitis of toe of right foot 05/01/2024    Bipolar disorder, current episode depressed, mild 09/21/2023    Osteomyelitis of toe 05/01/2024     Resolved Ambulatory Problems     Diagnosis Date Noted    No Resolved Ambulatory Problems     Past Medical History:   Diagnosis Date    Arthritis     Asthma     Degenerative disc disease, lumbar     Diabetes mellitus     GERD (gastroesophageal reflux disease)     Hyperlipidemia     Hypertension     Neuropathy     Osteoarthritis     Positive colorectal cancer screening using Cologuard test     PVD (peripheral vascular disease)     Seasonal allergies     Sleep apnea          PAST SURGICAL HISTORY  Past Surgical History:   Procedure Laterality Date    AMPUTATION DIGIT Right 5/2/2024    Procedure: SECOND AMPUTATION DIGIT, RIGHT FOOT;  Surgeon: Turner Portillo DPM;  Location: [facility] OR;  Service: Podiatry;  Laterality: Right;    APPENDECTOMY      ARTHROPLASTY Right     KNEE    ECTOPIC PREGNANCY      JOINT  REPLACEMENT Bilateral     KNEE ARTHROPLASTY    TOTAL KNEE ARTHROPLASTY REVISION Left 10/02/2023    Procedure: TOTAL KNEE ARTHROPLASTY REVISION WITH CORI ROBOT;  Surgeon: Luis Ansari II, MD;  Location: [facility] [place] [facility] [place];  Service: Robotics - Ortho;  Laterality: Left;    WISDOM TOOTH EXTRACTION           FAMILY HISTORY  Family History   Problem Relation Age of Onset    Malig Hyperthermia Neg Hx          SOCIAL HISTORY  Social History     Socioeconomic History    Marital status:    Tobacco Use    Smoking status: Every Day     Current packs/day: 0.50     Average packs/day: 0.5 packs/day for 90.7 years (45.3 ttl pk-yrs)     Types: Cigarettes     Start date: 1979    Smokeless tobacco: Never   Vaping Use    Vaping status: Former   Substance and Sexual Activity    Alcohol use: Yes     Comment: SOCIALLY    Drug use: Never    Sexual activity: Defer         ALLERGIES  Latex        REVIEW OF SYSTEMS    As per HPI      PHYSICAL EXAM  ED Triage Vitals [08/30/24 2255]   Temp Heart Rate Resp BP SpO2   97.9 °F (36.6 °C) 66 18 134/82 100 %      Temp src Heart Rate Source Patient Position BP Location FiO2 (%)   -- -- -- -- --       Physical Exam  Constitutional:       General: She is not in acute distress.  HENT:      Head: Normocephalic and atraumatic.   Cardiovascular:      Rate and Rhythm: Normal rate and regular rhythm.   Pulmonary:      Effort: Pulmonary effort is normal. No respiratory distress.   Abdominal:      General: There is no distension.      Palpations: Abdomen is soft.      Tenderness: There is abdominal tenderness.      Comments: Mild epigastric tenderness to palpation without rebound or guarding   Musculoskeletal:         General: No swelling or deformity.   Skin:     General: Skin is warm and dry.   Neurological:      Mental Status: She is alert. Mental status is at baseline.           Vital signs and nursing notes reviewed.          LAB RESULTS  Recent Results (from the past 24 hour(s))   ECG 12  Lead Chest Pain    Collection Time: 08/30/24 11:57 PM   Result Value Ref Range    QT Interval 427 ms    QTC Interval 401 ms   Comprehensive Metabolic Panel    Collection Time: 08/31/24 12:21 AM    Specimen: Blood   Result Value Ref Range    Glucose 141 (H) 65 - 99 mg/dL    BUN 12 6 - 20 mg/dL    Creatinine 0.77 0.57 - 1.00 mg/dL    Sodium 137 136 - 145 mmol/L    Potassium 4.1 3.5 - 5.2 mmol/L    Chloride 101 98 - 107 mmol/L    CO2 25.9 22.0 - 29.0 mmol/L    Calcium 9.5 8.6 - 10.5 mg/dL    Total Protein 6.7 6.0 - 8.5 g/dL    Albumin 3.9 3.5 - 5.2 g/dL    ALT (SGPT) 10 1 - 33 U/L    AST (SGOT) 13 1 - 32 U/L    Alkaline Phosphatase 90 39 - 117 U/L    Total Bilirubin 0.2 0.0 - 1.2 mg/dL    Globulin 2.8 gm/dL    A/G Ratio 1.4 g/dL    BUN/Creatinine Ratio 15.6 7.0 - 25.0    Anion Gap 10.1 5.0 - 15.0 mmol/L    eGFR 89.0 >60.0 mL/min/1.73   Lipase    Collection Time: 08/31/24 12:21 AM    Specimen: Blood   Result Value Ref Range    Lipase 22 13 - 60 U/L   BNP    Collection Time: 08/31/24 12:21 AM    Specimen: Blood   Result Value Ref Range    proBNP 110.0 0.0 - 900.0 pg/mL   Single High Sensitivity Troponin T    Collection Time: 08/31/24 12:21 AM    Specimen: Blood   Result Value Ref Range    HS Troponin T <6 <14 ng/L   CBC Auto Differential    Collection Time: 08/31/24 12:21 AM    Specimen: Blood   Result Value Ref Range    WBC 8.25 3.40 - 10.80 10*3/mm3    RBC 4.43 3.77 - 5.28 10*6/mm3    Hemoglobin 13.1 12.0 - 15.9 g/dL    Hematocrit 39.3 34.0 - 46.6 %    MCV 88.7 79.0 - 97.0 fL    MCH 29.6 26.6 - 33.0 pg    MCHC 33.3 31.5 - 35.7 g/dL    RDW 13.1 12.3 - 15.4 %    RDW-SD 42.7 37.0 - 54.0 fl    MPV 9.2 6.0 - 12.0 fL    Platelets 200 140 - 450 10*3/mm3    Neutrophil % 52.8 42.7 - 76.0 %    Lymphocyte % 36.0 19.6 - 45.3 %    Monocyte % 7.0 5.0 - 12.0 %    Eosinophil % 3.2 0.3 - 6.2 %    Basophil % 0.5 0.0 - 1.5 %    Immature Grans % 0.5 0.0 - 0.5 %    Neutrophils, Absolute 4.36 1.70 - 7.00 10*3/mm3    Lymphocytes, Absolute  2.97 0.70 - 3.10 10*3/mm3    Monocytes, Absolute 0.58 0.10 - 0.90 10*3/mm3    Eosinophils, Absolute 0.26 0.00 - 0.40 10*3/mm3    Basophils, Absolute 0.04 0.00 - 0.20 10*3/mm3    Immature Grans, Absolute 0.04 0.00 - 0.05 10*3/mm3    nRBC 0.0 0.0 - 0.2 /100 WBC   Urinalysis With Microscopic If Indicated (No Culture) - Urine, Clean Catch    Collection Time: 08/31/24  1:37 AM    Specimen: Urine, Clean Catch   Result Value Ref Range    Color, UA Yellow Yellow, Straw    Appearance, UA Clear Clear    pH, UA <=5.0 5.0 - 8.0    Specific Gravity, UA 1.016 1.005 - 1.030    Glucose, UA Negative Negative    Ketones, UA Negative Negative    Bilirubin, UA Negative Negative    Blood, UA Negative Negative    Protein, UA Negative Negative    Leuk Esterase, UA Trace (A) Negative    Nitrite, UA Negative Negative    Urobilinogen, UA 0.2 E.U./dL 0.2 - 1.0 E.U./dL   Urinalysis, Microscopic Only - Urine, Clean Catch    Collection Time: 08/31/24  1:37 AM    Specimen: Urine, Clean Catch   Result Value Ref Range    RBC, UA 0-2 None Seen, 0-2 /HPF    WBC, UA 0-2 None Seen, 0-2 /HPF    Bacteria, UA None Seen None Seen /HPF    Squamous Epithelial Cells, UA 0-2 None Seen, 0-2 /HPF    Hyaline Casts, UA None Seen None Seen /LPF    Methodology Automated Microscopy        Ordered the above labs and reviewed the results.        RADIOLOGY  XR Chest 1 View    Result Date: 8/31/2024  SINGLE VIEW OF THE CHEST  HISTORY: Chest pain  COMPARISON: September 29, 2023  FINDINGS: There is cardiomegaly. There is no vascular congestion. No pneumothorax or pleural effusion is seen. There is linear scarring versus atelectasis at the right lung base. No definite acute infiltrates are seen.      Linear scarring versus atelectasis at the left lung base.  This report was finalized on 8/31/2024 12:19 AM by Dr. Sirisha Castellanos M.D on Workstation: [email or web address]       Ordered the above noted radiological studies. Reviewed by me in PACS.            MEDICATIONS GIVEN IN  ER  Medications - No data to display            MEDICAL DECISION MAKING, PROGRESS, and CONSULTS    MDM: Patient presented emergency department with epigastric abdominal pain, otherwise well-appearing, vitals otherwise stable.  Unclear etiology.  Labs reassuring, plan for CT for further evaluation.    All labs have been independently reviewed by me.  All radiology studies have been reviewed by me and I have also reviewed the radiology report.   EKG's independently viewed and interpreted by me.  Discussion below represents my analysis of pertinent findings related to patient's condition, differential diagnosis, treatment plan and final disposition.      Additional sources:  - Discussed/ obtained information from independent historians:      - External (non-ED) record review:     - Chronic or social conditions impacting care:     - Shared decision making:        Orders placed during this visit:  Orders Placed This Encounter   Procedures    XR Chest 1 View    CT Abdomen Pelvis With Contrast    Comprehensive Metabolic Panel    Lipase    Urinalysis With Microscopic If Indicated (No Culture) - Urine, Clean Catch    BNP    Single High Sensitivity Troponin T    CBC Auto Differential    Urinalysis, Microscopic Only - Urine, Clean Catch    ECG 12 Lead Chest Pain    CBC & Differential         Additional orders considered but not ordered:  Considered CT chest however patient has no significant chest pain or shortness of breath, pain appears to radiate from the abdomen.        Differential diagnosis includes but is not limited to:    Gastritis, esophagitis, GERD, pancreatitis, cholecystitis, choledocholithiasis, urinary tract infection, colitis, acute aortic syndrome      Independent interpretation of labs, radiology studies, and discussions with consultants:  ED Course as of 08/31/24 1954   Sat Aug 31, 2024   0004 EKG interpreted myself:  2357, sinus rhythm at rate of 53, no acute ST segment changes or T wave inversions.  LVH  with associated repolarization abnormality. [FS]   0207 Patient was turned over to me by Dr. East.  Pending: imaging report and disposition.   [AR]   0439 The patient was reexamined.  They have had symptomatic improvement during their ED stay.  I discussed today's findings with the patient, explaining the pertinent positives and negatives from today's visit, and the plan of care.  Discussed plan for discharge as there is no emergent indication for admission.  Discussed limitation of the ED work-up and that this is to rule out life-threatening emergencies but that they could require further testing as determined by their primary care and or any referred specialist patient is agreeable and understands need for follow-up and repeat exam/testing.  Patient is aware that discharge does not mean there is nothing wrong, indicates no emergency is present, and that they must continue their care with their primary care physician and/or any referred specialist.  They were given appropriate follow-up with their primary care physician and/or specialist.  I had an extensive discussion on the expected clinical course and return precautions.  Patient understands to return to the emergency department for continuation, worsening, or new symptoms.  I answered any of the patient's questions. Patient was discharged home in a stable condition.     [AR]      ED Course User Index  [AR] Yamila Lockett, LAZARO  [FS] Santi East MD           DIAGNOSIS  Final diagnoses:   Esophagitis         DISPOSITION  Discharged home        Latest Documented Vital Signs:  As of 02:01 EDT  BP- 102/69 HR- 52 Temp- 97.9 °F (36.6 °C) O2 sat- 92%              --    Please note that portions of this were completed with a voice recognition program.       Note Disclaimer: At [facility], we believe that sharing information builds trust and better relationships. You are receiving this note because you are receiving care at [facility] or recently visited. It  is possible you will see health information before a provider has talked with you about it. This kind of information can be easy to misunderstand. To help you fully understand what it means for your health, we urge you to discuss this note with your provider.             Santi East MD  08/31/24 1955

## 2024-09-30 NOTE — DISCHARGE INSTRUCTIONS
You have been given emergency department evaluation.  This evaluation is intended to rule out life-threatening conditions.  Is not a complete evaluation.  You could require further testing as determined by your primary care physician or any referred specialist.  Please follow-up with all doctors that you are referred to.  Please be sure to take your prescribed medications and follow any specific instructions in the discharge instructions.  Please follow-up with your primary care physician within 48 hours.  Please have your primary care provider recheck your blood pressure.  Follow up with gastroenterology for further evaluation and management.  Please return to the emergency department if you experience chest pain, shortness of breath, abdominal pain, fever greater than 102, intractable vomiting.  Please return to the emergency department if your symptoms continue or worsen, or if you begin to experience any other concerning symptom.     No

## 2025-03-18 ENCOUNTER — HOSPITAL ENCOUNTER (EMERGENCY)
Facility: HOSPITAL | Age: 60
Discharge: HOME | End: 2025-03-18
Attending: EMERGENCY MEDICINE | Admitting: EMERGENCY MEDICINE
Payer: COMMERCIAL

## 2025-03-18 ENCOUNTER — APPOINTMENT (OUTPATIENT)
Dept: GENERAL RADIOLOGY | Facility: HOSPITAL | Age: 60
End: 2025-03-18
Payer: COMMERCIAL

## 2025-03-18 VITALS
TEMPERATURE: 98 F | SYSTOLIC BLOOD PRESSURE: 129 MMHG | HEIGHT: 69 IN | DIASTOLIC BLOOD PRESSURE: 69 MMHG | OXYGEN SATURATION: 91 % | RESPIRATION RATE: 15 BRPM | BODY MASS INDEX: 32.13 KG/M2 | WEIGHT: 216.93 LBS | HEART RATE: 95 BPM

## 2025-03-18 DIAGNOSIS — J10.1 INFLUENZA A: Primary | ICD-10-CM

## 2025-03-18 LAB
B PARAPERT DNA SPEC QL NAA+PROBE: NOT DETECTED
B PERT DNA SPEC QL NAA+PROBE: NOT DETECTED
C PNEUM DNA NPH QL NAA+NON-PROBE: NOT DETECTED
FLUAV H1 2009 PAND RNA NPH QL NAA+PROBE: DETECTED
FLUBV RNA ISLT QL NAA+PROBE: NOT DETECTED
HADV DNA SPEC NAA+PROBE: NOT DETECTED
HCOV 229E RNA SPEC QL NAA+PROBE: NOT DETECTED
HCOV HKU1 RNA SPEC QL NAA+PROBE: NOT DETECTED
HCOV NL63 RNA SPEC QL NAA+PROBE: NOT DETECTED
HCOV OC43 RNA SPEC QL NAA+PROBE: NOT DETECTED
HMPV RNA NPH QL NAA+NON-PROBE: NOT DETECTED
HPIV1 RNA ISLT QL NAA+PROBE: NOT DETECTED
HPIV2 RNA SPEC QL NAA+PROBE: NOT DETECTED
HPIV3 RNA NPH QL NAA+PROBE: NOT DETECTED
HPIV4 P GENE NPH QL NAA+PROBE: NOT DETECTED
M PNEUMO IGG SER IA-ACNC: NOT DETECTED
RHINOVIRUS RNA SPEC NAA+PROBE: NOT DETECTED
RSV RNA NPH QL NAA+NON-PROBE: NOT DETECTED
SARS-COV-2 RNA NPH QL NAA+NON-PROBE: NOT DETECTED

## 2025-03-18 PROCEDURE — 25810000003 SODIUM CHLORIDE 0.9 % SOLUTION: Performed by: PHYSICIAN ASSISTANT

## 2025-03-18 PROCEDURE — 0202U NFCT DS 22 TRGT SARS-COV-2: CPT | Performed by: PHYSICIAN ASSISTANT

## 2025-03-18 PROCEDURE — 25010000002 KETOROLAC TROMETHAMINE PER 15 MG: Performed by: PHYSICIAN ASSISTANT

## 2025-03-18 PROCEDURE — 99283 EMERGENCY DEPT VISIT LOW MDM: CPT

## 2025-03-18 PROCEDURE — 71045 X-RAY EXAM CHEST 1 VIEW: CPT

## 2025-03-18 PROCEDURE — 96374 THER/PROPH/DIAG INJ IV PUSH: CPT

## 2025-03-18 RX ORDER — GUAIFENESIN AND DEXTROMETHORPHAN HYDROBROMIDE 600; 30 MG/1; MG/1
2 TABLET, EXTENDED RELEASE ORAL 2 TIMES DAILY PRN
Qty: 20 EACH | Refills: 0 | Status: SHIPPED | OUTPATIENT
Start: 2025-03-18 | End: 2025-03-18

## 2025-03-18 RX ORDER — OSELTAMIVIR PHOSPHATE 75 MG/1
75 CAPSULE ORAL 2 TIMES DAILY
Qty: 10 CAPSULE | Refills: 0 | Status: SHIPPED | OUTPATIENT
Start: 2025-03-18 | End: 2025-03-18

## 2025-03-18 RX ORDER — NAPROXEN 500 MG/1
500 TABLET ORAL 2 TIMES DAILY PRN
Qty: 15 TABLET | Refills: 0 | Status: SHIPPED | OUTPATIENT
Start: 2025-03-18 | End: 2025-03-18

## 2025-03-18 RX ORDER — SODIUM CHLORIDE 0.9 % (FLUSH) 0.9 %
10 SYRINGE (ML) INJECTION AS NEEDED
Status: DISCONTINUED | OUTPATIENT
Start: 2025-03-18 | End: 2025-03-18 | Stop reason: HOSPADM

## 2025-03-18 RX ORDER — NAPROXEN 500 MG/1
500 TABLET ORAL 2 TIMES DAILY PRN
Qty: 15 TABLET | Refills: 0 | Status: SHIPPED | OUTPATIENT
Start: 2025-03-18

## 2025-03-18 RX ORDER — KETOROLAC TROMETHAMINE 30 MG/ML
30 INJECTION, SOLUTION INTRAMUSCULAR; INTRAVENOUS ONCE
Status: COMPLETED | OUTPATIENT
Start: 2025-03-18 | End: 2025-03-18

## 2025-03-18 RX ORDER — GUAIFENESIN AND DEXTROMETHORPHAN HYDROBROMIDE 600; 30 MG/1; MG/1
2 TABLET, EXTENDED RELEASE ORAL 2 TIMES DAILY PRN
Qty: 20 EACH | Refills: 0 | Status: SHIPPED | OUTPATIENT
Start: 2025-03-18

## 2025-03-18 RX ORDER — OSELTAMIVIR PHOSPHATE 75 MG/1
75 CAPSULE ORAL 2 TIMES DAILY
Qty: 10 CAPSULE | Refills: 0 | Status: SHIPPED | OUTPATIENT
Start: 2025-03-18

## 2025-03-18 RX ADMIN — KETOROLAC TROMETHAMINE 30 MG: 30 INJECTION, SOLUTION INTRAMUSCULAR at 16:31

## 2025-03-18 RX ADMIN — SODIUM CHLORIDE 500 ML: 9 INJECTION, SOLUTION INTRAVENOUS at 16:29

## 2025-03-18 NOTE — ED PROVIDER NOTES
EMERGENCY DEPARTMENT ENCOUNTER      Room Number:  01/01  PCP: Kassy Antonio APRN  Independent Historians: Patient  Patient Care Team:  Kassy Antonio APRN as PCP - General (Family Medicine)       HPI:  Chief Complaint: Fatigue, headache    A complete HPI/ROS/PMH/PSH/SH/FH are unobtainable due to: None    Chronic or social conditions impacting patient care (Social Determinants of Health): None      Context: Yenny Crawford is a 59 y.o. female with a PMH significant for COPD, CAD, HTN, chronic back pain, diabetes, neuropathy who presents to the ED c/o acute fatigue and headache.  The patient reports that she was feeling mildly ill last night with some fatigue but woke up this morning with headache, fever with a Tmax of 102, cough, upper respiratory congestion.  She was exposed to a neighbor recently with similar symptoms but reports that she tried to wear a mask to avoid aristeo any illness.  No chest pain, dizziness, syncope, nausea, vomiting, diarrhea.      Upon review of prior external notes (non-ED) -and- Review of prior external test results outside of this encounter it appears the patient was evaluated in the office with orthopedics for knee pain on 8/14/2023.  The patient had a normal CBC and glucose on 5/3/2024 and 5/2/2024 respectively.      PAST MEDICAL HISTORY  Active Ambulatory Problems     Diagnosis Date Noted    Status post knee replacement 10/02/2023    COPD (chronic obstructive pulmonary disease) 10/03/2023    CAD (coronary artery disease) 10/03/2023    Tobacco abuse 10/03/2023    HTN (hypertension) 10/03/2023    Type 2 diabetes mellitus with hyperglycemia, without long-term current use of insulin 10/03/2023    Peripheral neuropathy 10/03/2023    Chronic back pain 10/03/2023    Cellulitis of toe of right foot 05/01/2024    Bipolar disorder, current episode depressed, mild 09/21/2023    Osteomyelitis of toe 05/01/2024     Resolved Ambulatory Problems     Diagnosis Date Noted    No  Resolved Ambulatory Problems     Past Medical History:   Diagnosis Date    Arthritis     Asthma     Degenerative disc disease, lumbar     Diabetes mellitus     GERD (gastroesophageal reflux disease)     Hyperlipidemia     Hypertension     Neuropathy     Osteoarthritis     Positive colorectal cancer screening using Cologuard test     PVD (peripheral vascular disease)     Seasonal allergies     Sleep apnea          PAST SURGICAL HISTORY  Past Surgical History:   Procedure Laterality Date    AMPUTATION DIGIT Right 5/2/2024    Procedure: SECOND AMPUTATION DIGIT, RIGHT FOOT;  Surgeon: Turner Portillo DPM;  Location: [facility] MAIN OR;  Service: Podiatry;  Laterality: Right;    APPENDECTOMY      ARTHROPLASTY Right     KNEE    ECTOPIC PREGNANCY      JOINT REPLACEMENT Bilateral     KNEE ARTHROPLASTY    TOTAL KNEE ARTHROPLASTY REVISION Left 10/02/2023    Procedure: TOTAL KNEE ARTHROPLASTY REVISION WITH CORI ROBOT;  Surgeon: Luis Ansari II, MD;  Location: [facility] OR [facility];  Service: Robotics - Ortho;  Laterality: Left;    WISDOM TOOTH EXTRACTION           FAMILY HISTORY  Family History   Problem Relation Age of Onset    Malig Hyperthermia Neg Hx          SOCIAL HISTORY  Social History     Socioeconomic History    Marital status:    Tobacco Use    Smoking status: Every Day     Current packs/day: 0.50     Average packs/day: 0.5 packs/day for 91.2 years (45.6 ttl pk-yrs)     Types: Cigarettes     Start date: 1979    Smokeless tobacco: Never   Vaping Use    Vaping status: Former   Substance and Sexual Activity    Alcohol use: Yes     Comment: SOCIALLY    Drug use: Never    Sexual activity: Defer         ALLERGIES  Latex      REVIEW OF SYSTEMS  Included in HPI  All systems reviewed and negative except for those discussed in HPI.      PHYSICAL EXAM    I have reviewed the triage vital signs and nursing notes.    ED Triage Vitals [03/18/25 1531]   Temp Heart Rate Resp BP SpO2   98 °F (36.7 °C) 89 15 (!) 172/108 97 %       Temp src Heart Rate Source Patient Position BP Location FiO2 (%)   -- -- -- -- --       Physical Exam  Constitutional:       General: She is not in acute distress.     Appearance: She is well-developed. She is ill-appearing. She is not toxic-appearing.   HENT:      Head: Normocephalic and atraumatic.   Eyes:      General: No scleral icterus.     Conjunctiva/sclera: Conjunctivae normal.   Neck:      Trachea: No tracheal deviation.   Cardiovascular:      Rate and Rhythm: Regular rhythm.      Comments: Borderline tachycardia  Pulmonary:      Effort: Pulmonary effort is normal.      Breath sounds: Normal breath sounds.   Abdominal:      Palpations: Abdomen is soft.      Tenderness: There is no abdominal tenderness.   Musculoskeletal:         General: No deformity.      Cervical back: Normal range of motion.   Lymphadenopathy:      Cervical: No cervical adenopathy.   Skin:     General: Skin is warm and dry.   Neurological:      Mental Status: She is alert and oriented to person, place, and time.   Psychiatric:         Behavior: Behavior normal.         Vital signs and nursing notes reviewed.      PPE: I wore a surgical mask throughout my encounters with the pt. I performed hand hygiene on entry into the pt room and upon exit.     LAB RESULTS  Recent Results (from the past 24 hours)   Respiratory Panel PCR w/COVID-19(SARS-CoV-2) KIMBERLYN/ROSIE/GODFREY/PAD/COR/CATERINA In-House, NP Swab in UTM/VTM, 2 HR TAT - Swab, Nasopharynx    Collection Time: 03/18/25  4:21 PM    Specimen: Nasopharynx; Swab   Result Value Ref Range    ADENOVIRUS, PCR Not Detected Not Detected    Coronavirus 229E Not Detected Not Detected    Coronavirus HKU1 Not Detected Not Detected    Coronavirus NL63 Not Detected Not Detected    Coronavirus OC43 Not Detected Not Detected    COVID19 Not Detected Not Detected - Ref. Range    Human Metapneumovirus Not Detected Not Detected    Human Rhinovirus/Enterovirus Not Detected Not Detected    Influenza A H1 2009 PCR  Detected (A) Not Detected    Influenza B PCR Not Detected Not Detected    Parainfluenza Virus 1 Not Detected Not Detected    Parainfluenza Virus 2 Not Detected Not Detected    Parainfluenza Virus 3 Not Detected Not Detected    Parainfluenza Virus 4 Not Detected Not Detected    RSV, PCR Not Detected Not Detected    Bordetella pertussis pcr Not Detected Not Detected    Bordetella parapertussis PCR Not Detected Not Detected    Chlamydophila pneumoniae PCR Not Detected Not Detected    Mycoplasma pneumo by PCR Not Detected Not Detected         RADIOLOGY  XR Chest 1 View  Result Date: 3/18/2025  XR CHEST 1 VW-   INDICATION: Cough, body aches, headache  COMPARISON: Chest radiograph August 30, 2024  TECHNIQUE: 1 view chest  FINDINGS:  No focal opacity. No effusions. Normal mediastinal contour. Heart is normal in size.      No acute cardiopulmonary process  This report was finalized on 3/18/2025 5:04 PM by Dr. Ryan Edward M.D on Workstation: [id number]          MEDICATIONS GIVEN IN ER  Medications   sodium chloride 0.9 % flush 10 mL (has no administration in time range)   sodium chloride 0.9 % bolus 500 mL (500 mL Intravenous New Bag 3/18/25 1629)   ketorolac (TORADOL) injection 30 mg (30 mg Intravenous Given 3/18/25 1631)         ORDERS PLACED DURING THIS VISIT:  Orders Placed This Encounter   Procedures    Respiratory Panel PCR w/COVID-19(SARS-CoV-2) KIMBERLYN/ROSIE/GODFREY/PAD/COR/CATERINA In-House, NP Swab in UTM/VTM, 2 HR TAT - Swab, Nasopharynx    XR Chest 1 View    Insert Peripheral IV         OUTPATIENT MEDICATION MANAGEMENT:  Current Facility-Administered Medications Ordered in Epic   Medication Dose Route Frequency Provider Last Rate Last Admin    sodium chloride 0.9 % flush 10 mL  10 mL Intravenous PRN Kristofer Hair PA         Current Outpatient Medications Ordered in Epic   Medication Sig Dispense Refill    acyclovir (ZOVIRAX) 400 MG tablet Take 1 tablet by mouth Daily As Needed.      albuterol sulfate  (90  Base) MCG/ACT inhaler Inhale 2 puffs Every 4 (Four) Hours As Needed.      CHLORHEXIDINE GLUCONATE CLOTH EX Apply  topically. USE AS DIRECTED      Cholecalciferol 50 MCG (2000 UT) tablet Take 1 tablet by mouth Daily. HOLDING FOR DOS      Cyanocobalamin 1000 MCG/ML kit Inject 1 mL into the appropriate muscle as directed by prescriber Every 30 (Thirty) Days. STATES SHE IS DUE AFTER THE 1ST      gabapentin (NEURONTIN) 600 MG tablet Take 1 tablet by mouth 3 (Three) Times a Day.      guaifenesin-dextromethorphan 600-30 mg (MUCINEX DM)  MG tablet sustained-release 12 hour Take 2 tablets by mouth 2 (Two) Times a Day As Needed (Cough). 20 each 0    Jardiance 10 MG tablet tablet Take 1 tablet by mouth Daily. Indications: Type 2 Diabetes      loratadine (CLARITIN) 10 MG tablet Take 1 tablet by mouth Daily.      metFORMIN (GLUCOPHAGE) 850 MG tablet Take 1 tablet by mouth 2 (Two) Times a Day With Meals. NOT CURRENTLY TAKING  Indications: Type 2 Diabetes      metoprolol tartrate (LOPRESSOR) 25 MG tablet Take 1 tablet by mouth 2 (Two) Times a Day.      naproxen (NAPROSYN) 500 MG tablet Take 1 tablet by mouth 2 (Two) Times a Day As Needed for Mild Pain. 15 tablet 0    nitroglycerin (NITROSTAT) 0.4 MG SL tablet Place 1 tablet under the tongue Every 5 (Five) Minutes As Needed.      Omega-3 Fatty Acids (fish oil) 1200 MG capsule capsule Take 1 capsule by mouth Daily With Breakfast. HOLDING FOR DOS      ondansetron ODT (ZOFRAN-ODT) 4 MG disintegrating tablet Place 1 tablet on the tongue Every 8 (Eight) Hours As Needed for Nausea or Vomiting. 15 tablet 0    oseltamivir (Tamiflu) 75 MG capsule Take 1 capsule by mouth 2 (Two) Times a Day. 10 capsule 0    oxyCODONE-acetaminophen (PERCOCET) 5-325 MG per tablet Take 1 tablet by mouth Every 4 (Four) Hours As Needed for Severe Pain. 50 tablet 0    simvastatin (ZOCOR) 10 MG tablet Take 1 tablet by mouth Every Night.      TiZANidine (ZANAFLEX) 2 MG capsule Take 1 capsule by mouth 3 (Three)  Times a Day.                PROGRESS, DATA ANALYSIS, CONSULTS, AND MEDICAL DECISION MAKING  All labs have been independently interpreted by me.  All radiology studies have been reviewed by me. All EKG's have been independently viewed and interpreted by me.  Discussion below represents my analysis of pertinent findings related to patient's condition, differential diagnosis, treatment plan and final disposition.    Patient presentation and evaluation most consistent with acute viral syndrome related influenza A infection.  Plan for supportive care and Tamiflu at home as an outpatient with PCP follow-up after symptom resolution.  Close return precautions given and all questions answered.  No indication for further workup or admission at this time.  Patient agreeable with all questions answered.    DIFFERENTIAL DIAGNOSIS INCLUDE BUT NOT LIMITED TO:     Differential diagnosis includes but is not limited to:  -COVID-19  -pneumonia  -asthma/COPD  -Influenza  -RSV  -Rhinovirus  -Human Metapneumovirus  -Mycoplasma infection  -Viral syndrome        Clinical Scores: N/A      ED Course as of 03/18/25 1823   Tue Mar 18, 2025   1643 Per my independent interpretation of the chest x-ray, there is no obvious pneumothorax. [DC]   1705 Per my independent interpretation of the chest x-ray, there is no obvious pneumothorax. [DC]   1822 Influenza A H1 2009 PCR(!): Detected [DC]      ED Course User Index  [DC] Kristofer Hair, PA         1823 I rechecked the patient.  I discussed the patient's labs, radiology findings (including all incidental findings), diagnosis, and plan for discharge.  A repeat exam reveals no new worrisome changes from my initial exam findings.  The patient understands that the fact that they are being discharged does not denote that nothing is abnormal, it indicates that no clinical emergency is present and that they must follow-up as directed in order to properly maintain their health.  Follow-up instructions  (specifically listed below) and return to ER precautions were given at this time.  I specifically instructed the patient to follow-up with their PCP.  The patient understands and agrees with the plan, and is ready for discharge.  All questions answered.         AS OF 18:23 EDT VITALS:    BP - 160/95  HR - 98  TEMP - 98 °F (36.7 °C)  O2 SATS - 90%    COMPLEXITY OF CARE  Admission was considered but after careful review of the patient's presentation, physical examination, diagnostic results, and response to treatment the patient may be safely discharged with outpatient follow-up.      DIAGNOSIS  Final diagnoses:   Influenza A         DISPOSITION  ED Disposition       ED Disposition   Discharge    Condition   Stable    Comment   --                Please note that portions of this document were completed with a voice recognition program.    Note Disclaimer: At [facility], we believe that sharing information builds trust and better relationships. You are receiving this note because you recently visited [facility]. It is possible you will see health information before a provider has talked with you about it. This kind of information can be easy to misunderstand. To help you fully understand what it means for your health, we urge you to discuss this note with your provider.         Kristofer Hair PA  03/18/25 2922

## 2025-04-22 ENCOUNTER — HOSPITAL ENCOUNTER (OUTPATIENT)
Dept: GENERAL RADIOLOGY | Facility: HOSPITAL | Age: 60
Discharge: HOME | End: 2025-04-22
Payer: COMMERCIAL

## 2025-04-22 ENCOUNTER — PRE-ADMISSION TESTING (OUTPATIENT)
Dept: PREADMISSION TESTING | Facility: HOSPITAL | Age: 60
End: 2025-04-22
Payer: COMMERCIAL

## 2025-04-22 VITALS
HEIGHT: 68 IN | TEMPERATURE: 98.4 F | RESPIRATION RATE: 20 BRPM | HEART RATE: 76 BPM | BODY MASS INDEX: 33.77 KG/M2 | OXYGEN SATURATION: 98 % | SYSTOLIC BLOOD PRESSURE: 121 MMHG | DIASTOLIC BLOOD PRESSURE: 89 MMHG | WEIGHT: 222.8 LBS

## 2025-04-22 LAB
ALBUMIN SERPL-MCNC: 3.9 G/DL (ref 3.5–5.2)
ALBUMIN/GLOB SERPL: 1.4 G/DL
ALP SERPL-CCNC: 95 U/L (ref 39–117)
ALT SERPL W P-5'-P-CCNC: 11 U/L (ref 1–33)
ANION GAP SERPL CALCULATED.3IONS-SCNC: 9 MMOL/L (ref 5–15)
AST SERPL-CCNC: 14 U/L (ref 1–32)
BILIRUB SERPL-MCNC: <0.2 MG/DL (ref 0–1.2)
BUN SERPL-MCNC: 20 MG/DL (ref 6–20)
BUN/CREAT SERPL: 30.3 (ref 7–25)
CALCIUM SPEC-SCNC: 9.3 MG/DL (ref 8.6–10.5)
CHLORIDE SERPL-SCNC: 107 MMOL/L (ref 98–107)
CO2 SERPL-SCNC: 28 MMOL/L (ref 22–29)
CREAT SERPL-MCNC: 0.66 MG/DL (ref 0.57–1)
CRP SERPL-MCNC: 0.69 MG/DL (ref 0–0.5)
DEPRECATED RDW RBC AUTO: 42.2 FL (ref 37–54)
EGFRCR SERPLBLD CKD-EPI 2021: 101.2 ML/MIN/1.73
ERYTHROCYTE [DISTWIDTH] IN BLOOD BY AUTOMATED COUNT: 13 % (ref 12.3–15.4)
ERYTHROCYTE [SEDIMENTATION RATE] IN BLOOD: 3 MM/HR (ref 0–30)
GLOBULIN UR ELPH-MCNC: 2.7 GM/DL
GLUCOSE SERPL-MCNC: 131 MG/DL (ref 65–99)
HBA1C MFR BLD: 6.2 % (ref 4.8–5.6)
HCT VFR BLD AUTO: 37.7 % (ref 34–46.6)
HGB BLD-MCNC: 12.9 G/DL (ref 12–15.9)
INR PPP: 1.01 (ref 0.9–1.1)
MCH RBC QN AUTO: 30.6 PG (ref 26.6–33)
MCHC RBC AUTO-ENTMCNC: 34.2 G/DL (ref 31.5–35.7)
MCV RBC AUTO: 89.3 FL (ref 79–97)
PLATELET # BLD AUTO: 232 10*3/MM3 (ref 140–450)
PMV BLD AUTO: 9.4 FL (ref 6–12)
POTASSIUM SERPL-SCNC: 4.5 MMOL/L (ref 3.5–5.2)
PROT SERPL-MCNC: 6.6 G/DL (ref 6–8.5)
PROTHROMBIN TIME: 13.2 SECONDS (ref 11.7–14.2)
QT INTERVAL: 404 MS
QTC INTERVAL: 440 MS
RBC # BLD AUTO: 4.22 10*6/MM3 (ref 3.77–5.28)
SODIUM SERPL-SCNC: 144 MMOL/L (ref 136–145)
WBC NRBC COR # BLD AUTO: 7.31 10*3/MM3 (ref 3.4–10.8)

## 2025-04-22 PROCEDURE — 93005 ELECTROCARDIOGRAM TRACING: CPT

## 2025-04-22 PROCEDURE — 36415 COLL VENOUS BLD VENIPUNCTURE: CPT

## 2025-04-22 PROCEDURE — 93010 ELECTROCARDIOGRAM REPORT: CPT | Performed by: INTERNAL MEDICINE

## 2025-04-22 PROCEDURE — 71046 X-RAY EXAM CHEST 2 VIEWS: CPT

## 2025-04-22 PROCEDURE — 85610 PROTHROMBIN TIME: CPT

## 2025-04-22 PROCEDURE — 80053 COMPREHEN METABOLIC PANEL: CPT

## 2025-04-22 PROCEDURE — 85652 RBC SED RATE AUTOMATED: CPT

## 2025-04-22 PROCEDURE — 83036 HEMOGLOBIN GLYCOSYLATED A1C: CPT

## 2025-04-22 PROCEDURE — 85027 COMPLETE CBC AUTOMATED: CPT

## 2025-04-22 PROCEDURE — 73560 X-RAY EXAM OF KNEE 1 OR 2: CPT

## 2025-04-22 PROCEDURE — 86140 C-REACTIVE PROTEIN: CPT

## 2025-04-22 RX ORDER — HYDROCODONE BITARTRATE AND ACETAMINOPHEN 7.5; 325 MG/1; MG/1
1 TABLET ORAL EVERY 6 HOURS PRN
COMMUNITY
End: 2025-05-01 | Stop reason: HOSPADM

## 2025-04-28 ENCOUNTER — HOSPITAL ENCOUNTER (INPATIENT)
Facility: HOSPITAL | Age: 60
LOS: 2 days | Discharge: HOME | End: 2025-05-01
Attending: ORTHOPAEDIC SURGERY | Admitting: ORTHOPAEDIC SURGERY
Payer: COMMERCIAL

## 2025-04-28 ENCOUNTER — ANESTHESIA (OUTPATIENT)
Dept: PERIOP | Facility: HOSPITAL | Age: 60
End: 2025-04-28
Payer: COMMERCIAL

## 2025-04-28 ENCOUNTER — ANESTHESIA EVENT (OUTPATIENT)
Dept: PERIOP | Facility: HOSPITAL | Age: 60
End: 2025-04-28
Payer: COMMERCIAL

## 2025-04-28 ENCOUNTER — APPOINTMENT (OUTPATIENT)
Dept: GENERAL RADIOLOGY | Facility: HOSPITAL | Age: 60
End: 2025-04-28
Payer: COMMERCIAL

## 2025-04-28 PROBLEM — Z96.659 KNEE JOINT REPLACEMENT STATUS: Status: ACTIVE | Noted: 2025-04-28

## 2025-04-28 PROBLEM — E66.9 OBESITY (BMI 30-39.9): Status: ACTIVE | Noted: 2025-04-28

## 2025-04-28 LAB
ABO GROUP BLD: NORMAL
BLD GP AB SCN SERPL QL: NEGATIVE
GLUCOSE BLDC GLUCOMTR-MCNC: 114 MG/DL (ref 70–130)
GLUCOSE BLDC GLUCOMTR-MCNC: 177 MG/DL (ref 70–130)
GLUCOSE BLDC GLUCOMTR-MCNC: 194 MG/DL (ref 70–130)
GLUCOSE BLDC GLUCOMTR-MCNC: 220 MG/DL (ref 70–130)
RH BLD: POSITIVE
T&S EXPIRATION DATE: NORMAL

## 2025-04-28 PROCEDURE — C1776 JOINT DEVICE (IMPLANTABLE): HCPCS | Performed by: ORTHOPAEDIC SURGERY

## 2025-04-28 PROCEDURE — 25010000002 CLONIDINE PER 1 MG: Performed by: ORTHOPAEDIC SURGERY

## 2025-04-28 PROCEDURE — 25010000002 HYDRALAZINE PER 20 MG: Performed by: NURSE ANESTHETIST, CERTIFIED REGISTERED

## 2025-04-28 PROCEDURE — 73560 X-RAY EXAM OF KNEE 1 OR 2: CPT

## 2025-04-28 PROCEDURE — 25010000002 KETOROLAC TROMETHAMINE PER 15 MG: Performed by: ORTHOPAEDIC SURGERY

## 2025-04-28 PROCEDURE — 25010000002 FENTANYL CITRATE (PF) 50 MCG/ML SOLUTION: Performed by: NURSE ANESTHETIST, CERTIFIED REGISTERED

## 2025-04-28 PROCEDURE — 97161 PT EVAL LOW COMPLEX 20 MIN: CPT

## 2025-04-28 PROCEDURE — 63710000001 INSULIN LISPRO (HUMAN) PER 5 UNITS: Performed by: HOSPITALIST

## 2025-04-28 PROCEDURE — 25010000002 HYDROMORPHONE PER 4 MG: Performed by: NURSE ANESTHETIST, CERTIFIED REGISTERED

## 2025-04-28 PROCEDURE — 25010000002 SUGAMMADEX 200 MG/2ML SOLUTION: Performed by: NURSE ANESTHETIST, CERTIFIED REGISTERED

## 2025-04-28 PROCEDURE — 25010000002 PROPOFOL 10 MG/ML EMULSION: Performed by: NURSE ANESTHETIST, CERTIFIED REGISTERED

## 2025-04-28 PROCEDURE — 25010000002 VANCOMYCIN 1 G RECONSTITUTED SOLUTION: Performed by: ORTHOPAEDIC SURGERY

## 2025-04-28 PROCEDURE — C1713 ANCHOR/SCREW BN/BN,TIS/BN: HCPCS | Performed by: ORTHOPAEDIC SURGERY

## 2025-04-28 PROCEDURE — 25010000002 CEFAZOLIN PER 500 MG: Performed by: ORTHOPAEDIC SURGERY

## 2025-04-28 PROCEDURE — 25010000002 FAMOTIDINE 10 MG/ML SOLUTION: Performed by: STUDENT IN AN ORGANIZED HEALTH CARE EDUCATION/TRAINING PROGRAM

## 2025-04-28 PROCEDURE — 25810000003 LACTATED RINGERS PER 1000 ML: Performed by: STUDENT IN AN ORGANIZED HEALTH CARE EDUCATION/TRAINING PROGRAM

## 2025-04-28 PROCEDURE — 25010000002 ONDANSETRON PER 1 MG: Performed by: NURSE ANESTHETIST, CERTIFIED REGISTERED

## 2025-04-28 PROCEDURE — 82948 REAGENT STRIP/BLOOD GLUCOSE: CPT

## 2025-04-28 PROCEDURE — 25010000002 LIDOCAINE 1 % SOLUTION: Performed by: NURSE ANESTHETIST, CERTIFIED REGISTERED

## 2025-04-28 PROCEDURE — 86900 BLOOD TYPING SEROLOGIC ABO: CPT | Performed by: ORTHOPAEDIC SURGERY

## 2025-04-28 PROCEDURE — 25810000003 SODIUM CHLORIDE PER 500 ML: Performed by: ORTHOPAEDIC SURGERY

## 2025-04-28 PROCEDURE — 25010000002 DEXAMETHASONE PER 1 MG: Performed by: NURSE ANESTHETIST, CERTIFIED REGISTERED

## 2025-04-28 PROCEDURE — 97530 THERAPEUTIC ACTIVITIES: CPT

## 2025-04-28 PROCEDURE — 25010000002 ROPIVACAINE PER 1 MG: Performed by: ORTHOPAEDIC SURGERY

## 2025-04-28 PROCEDURE — 25010000002 EPINEPHRINE 1 MG/ML SOLUTION 30 ML VIAL: Performed by: ORTHOPAEDIC SURGERY

## 2025-04-28 PROCEDURE — 86850 RBC ANTIBODY SCREEN: CPT | Performed by: ORTHOPAEDIC SURGERY

## 2025-04-28 PROCEDURE — 25010000002 HYDROMORPHONE 1 MG/ML SOLUTION: Performed by: NURSE ANESTHETIST, CERTIFIED REGISTERED

## 2025-04-28 PROCEDURE — 0SRC0J9 REPLACEMENT OF RIGHT KNEE JOINT WITH SYNTHETIC SUBSTITUTE, CEMENTED, OPEN APPROACH: ICD-10-PCS | Performed by: ORTHOPAEDIC SURGERY

## 2025-04-28 PROCEDURE — 0SPC0JZ REMOVAL OF SYNTHETIC SUBSTITUTE FROM RIGHT KNEE JOINT, OPEN APPROACH: ICD-10-PCS | Performed by: ORTHOPAEDIC SURGERY

## 2025-04-28 PROCEDURE — 86901 BLOOD TYPING SEROLOGIC RH(D): CPT | Performed by: ORTHOPAEDIC SURGERY

## 2025-04-28 DEVICE — LEGION RK/HK FULL TIBIAL WEDGE SZ                                    3-4 10MM
Type: IMPLANTABLE DEVICE | Site: KNEE | Status: FUNCTIONAL
Brand: LEGION

## 2025-04-28 DEVICE — LEGION PRESSFIT STEM 12MM X 120MM
Type: IMPLANTABLE DEVICE | Site: KNEE | Status: FUNCTIONAL
Brand: LEGION

## 2025-04-28 DEVICE — JOURNEY BCS PATELLA RESURFACING                                    ROUND 38 MM STANDARD
Type: IMPLANTABLE DEVICE | Site: KNEE | Status: FUNCTIONAL
Brand: JOURNEY

## 2025-04-28 DEVICE — LEGION SCREW-ON LWEDGE 10MM DISTAL                                    X 5MM POSTERIOR SIZE 6
Type: IMPLANTABLE DEVICE | Site: KNEE | Status: FUNCTIONAL
Brand: LEGION

## 2025-04-28 DEVICE — LEGION REVISION TIBIAL BASEPLATE                                    SIZE 3 RIGHT
Type: IMPLANTABLE DEVICE | Site: KNEE | Status: FUNCTIONAL
Brand: LEGION

## 2025-04-28 DEVICE — LEGION PRESSFIT STEM 10MM X 120MM
Type: IMPLANTABLE DEVICE | Site: KNEE | Status: FUNCTIONAL
Brand: LEGION

## 2025-04-28 DEVICE — DEV CONTRL TISS STRATAFIX SYMM PDS PLUS VIL CT-1 60CM: Type: IMPLANTABLE DEVICE | Site: KNEE | Status: FUNCTIONAL

## 2025-04-28 DEVICE — KNOTLESS TISSUE CONTROL DEVICE, UNDYED UNIDIRECTIONAL (ANTIBACTERIAL) SYNTHETIC ABSORBABLE DEVICE
Type: IMPLANTABLE DEVICE | Site: KNEE | Status: FUNCTIONAL
Brand: STRATAFIX

## 2025-04-28 DEVICE — PALACOS® R IS A FAST-CURING, RADIOPAQUE, POLY(METHYL METHACRYLATE)-BASED BONE CEMENT.PALACOS ® R CONTAINS THE X-RAY CONTRAST MEDIUM ZIRCONIUM DIOXIDE. TO IMPROVE VISIBILITY IN THE SURGICAL FIELD PALACOS ® R HAS BEEN COLOURED WITH CHLOROPHYLL (E141). THE BONE CEMENT IS PREPARED DIRECTLY BEFORE USE BY MIXING A POLYMER POWDER COMPONENT WITH A LIQUID MONOMER COMPONENT. A DUCTILE DOUGH FORMS WHICH CURES WITHIN A FEW MINUTES.
Type: IMPLANTABLE DEVICE | Site: KNEE | Status: FUNCTIONAL
Brand: PALACOS®

## 2025-04-28 DEVICE — LEGION OXINIUM CONSTRAINED FEMORAL                                    SIZE 6 RIGHT
Type: IMPLANTABLE DEVICE | Site: KNEE | Status: FUNCTIONAL
Brand: LEGION

## 2025-04-28 DEVICE — LEGION PS HIGH FLEX XLPE SZ 3-4 13MM
Type: IMPLANTABLE DEVICE | Site: KNEE | Status: FUNCTIONAL
Brand: LEGION

## 2025-04-28 DEVICE — LEGION SCREW-ON DISTAL FEMORAL                                    WEDGE SIZE 6 5MM
Type: IMPLANTABLE DEVICE | Site: KNEE | Status: FUNCTIONAL
Brand: LEGION

## 2025-04-28 RX ORDER — LABETALOL HYDROCHLORIDE 5 MG/ML
5 INJECTION, SOLUTION INTRAVENOUS
Status: DISCONTINUED | OUTPATIENT
Start: 2025-04-28 | End: 2025-04-28 | Stop reason: HOSPADM

## 2025-04-28 RX ORDER — HYDROMORPHONE HYDROCHLORIDE 1 MG/ML
0.5 INJECTION, SOLUTION INTRAMUSCULAR; INTRAVENOUS; SUBCUTANEOUS
Status: DISCONTINUED | OUTPATIENT
Start: 2025-04-28 | End: 2025-04-28 | Stop reason: HOSPADM

## 2025-04-28 RX ORDER — SODIUM CHLORIDE 0.9 % (FLUSH) 0.9 %
3 SYRINGE (ML) INJECTION EVERY 12 HOURS SCHEDULED
Status: DISCONTINUED | OUTPATIENT
Start: 2025-04-28 | End: 2025-04-28 | Stop reason: HOSPADM

## 2025-04-28 RX ORDER — ONDANSETRON 2 MG/ML
INJECTION INTRAMUSCULAR; INTRAVENOUS AS NEEDED
Status: DISCONTINUED | OUTPATIENT
Start: 2025-04-28 | End: 2025-04-28 | Stop reason: SURG

## 2025-04-28 RX ORDER — LIDOCAINE HYDROCHLORIDE 10 MG/ML
0.5 INJECTION, SOLUTION INFILTRATION; PERINEURAL ONCE AS NEEDED
Status: DISCONTINUED | OUTPATIENT
Start: 2025-04-28 | End: 2025-04-28 | Stop reason: HOSPADM

## 2025-04-28 RX ORDER — FAMOTIDINE 20 MG/1
40 TABLET, FILM COATED ORAL DAILY
Status: DISCONTINUED | OUTPATIENT
Start: 2025-04-28 | End: 2025-05-01 | Stop reason: HOSPADM

## 2025-04-28 RX ORDER — DEXTROSE MONOHYDRATE 25 G/50ML
25 INJECTION, SOLUTION INTRAVENOUS
Status: DISCONTINUED | OUTPATIENT
Start: 2025-04-28 | End: 2025-05-01 | Stop reason: HOSPADM

## 2025-04-28 RX ORDER — SODIUM CHLORIDE 0.9 % (FLUSH) 0.9 %
3-10 SYRINGE (ML) INJECTION AS NEEDED
Status: DISCONTINUED | OUTPATIENT
Start: 2025-04-28 | End: 2025-04-28 | Stop reason: HOSPADM

## 2025-04-28 RX ORDER — DROPERIDOL 2.5 MG/ML
0.62 INJECTION, SOLUTION INTRAMUSCULAR; INTRAVENOUS
Status: DISCONTINUED | OUTPATIENT
Start: 2025-04-28 | End: 2025-04-28 | Stop reason: HOSPADM

## 2025-04-28 RX ORDER — DIPHENHYDRAMINE HYDROCHLORIDE 50 MG/ML
12.5 INJECTION, SOLUTION INTRAMUSCULAR; INTRAVENOUS
Status: DISCONTINUED | OUTPATIENT
Start: 2025-04-28 | End: 2025-04-28 | Stop reason: HOSPADM

## 2025-04-28 RX ORDER — MIDAZOLAM HYDROCHLORIDE 1 MG/ML
1 INJECTION, SOLUTION INTRAMUSCULAR; INTRAVENOUS
Status: DISCONTINUED | OUTPATIENT
Start: 2025-04-28 | End: 2025-04-28 | Stop reason: HOSPADM

## 2025-04-28 RX ORDER — FENTANYL CITRATE 50 UG/ML
50 INJECTION, SOLUTION INTRAMUSCULAR; INTRAVENOUS ONCE AS NEEDED
Status: DISCONTINUED | OUTPATIENT
Start: 2025-04-28 | End: 2025-04-28 | Stop reason: HOSPADM

## 2025-04-28 RX ORDER — FENTANYL CITRATE 50 UG/ML
50 INJECTION, SOLUTION INTRAMUSCULAR; INTRAVENOUS
Status: DISCONTINUED | OUTPATIENT
Start: 2025-04-28 | End: 2025-04-28 | Stop reason: HOSPADM

## 2025-04-28 RX ORDER — FLUMAZENIL 0.1 MG/ML
0.2 INJECTION INTRAVENOUS AS NEEDED
Status: DISCONTINUED | OUTPATIENT
Start: 2025-04-28 | End: 2025-04-28 | Stop reason: HOSPADM

## 2025-04-28 RX ORDER — INSULIN LISPRO 100 [IU]/ML
2-9 INJECTION, SOLUTION INTRAVENOUS; SUBCUTANEOUS
Status: DISCONTINUED | OUTPATIENT
Start: 2025-04-28 | End: 2025-05-01 | Stop reason: HOSPADM

## 2025-04-28 RX ORDER — OXYCODONE HYDROCHLORIDE 5 MG/1
5 TABLET ORAL EVERY 4 HOURS PRN
Status: DISCONTINUED | OUTPATIENT
Start: 2025-04-28 | End: 2025-04-30

## 2025-04-28 RX ORDER — NALOXONE HCL 0.4 MG/ML
0.2 VIAL (ML) INJECTION AS NEEDED
Status: DISCONTINUED | OUTPATIENT
Start: 2025-04-28 | End: 2025-04-28 | Stop reason: HOSPADM

## 2025-04-28 RX ORDER — IPRATROPIUM BROMIDE AND ALBUTEROL SULFATE 2.5; .5 MG/3ML; MG/3ML
3 SOLUTION RESPIRATORY (INHALATION) ONCE AS NEEDED
Status: DISCONTINUED | OUTPATIENT
Start: 2025-04-28 | End: 2025-04-28 | Stop reason: HOSPADM

## 2025-04-28 RX ORDER — DOCUSATE SODIUM 100 MG/1
100 CAPSULE, LIQUID FILLED ORAL 2 TIMES DAILY PRN
Status: DISCONTINUED | OUTPATIENT
Start: 2025-04-28 | End: 2025-05-01 | Stop reason: HOSPADM

## 2025-04-28 RX ORDER — IBUPROFEN 600 MG/1
1 TABLET ORAL
Status: DISCONTINUED | OUTPATIENT
Start: 2025-04-28 | End: 2025-05-01 | Stop reason: HOSPADM

## 2025-04-28 RX ORDER — OXYCODONE HYDROCHLORIDE 5 MG/1
10 TABLET ORAL EVERY 4 HOURS PRN
Status: DISCONTINUED | OUTPATIENT
Start: 2025-04-28 | End: 2025-04-30

## 2025-04-28 RX ORDER — DEXMEDETOMIDINE HYDROCHLORIDE 100 UG/ML
INJECTION, SOLUTION INTRAVENOUS AS NEEDED
Status: DISCONTINUED | OUTPATIENT
Start: 2025-04-28 | End: 2025-04-28 | Stop reason: SURG

## 2025-04-28 RX ORDER — ACETAMINOPHEN 500 MG
1000 TABLET ORAL EVERY 6 HOURS
Status: DISCONTINUED | OUTPATIENT
Start: 2025-04-28 | End: 2025-05-01 | Stop reason: HOSPADM

## 2025-04-28 RX ORDER — METOPROLOL TARTRATE 25 MG/1
25 TABLET, FILM COATED ORAL ONCE
Status: COMPLETED | OUTPATIENT
Start: 2025-04-28 | End: 2025-04-28

## 2025-04-28 RX ORDER — ATROPINE SULFATE 0.4 MG/ML
0.4 INJECTION, SOLUTION INTRAMUSCULAR; INTRAVENOUS; SUBCUTANEOUS ONCE AS NEEDED
Status: DISCONTINUED | OUTPATIENT
Start: 2025-04-28 | End: 2025-04-28 | Stop reason: HOSPADM

## 2025-04-28 RX ORDER — SODIUM CHLORIDE 9 MG/ML
INJECTION, SOLUTION INTRAVENOUS AS NEEDED
Status: DISCONTINUED | OUTPATIENT
Start: 2025-04-28 | End: 2025-04-28 | Stop reason: HOSPADM

## 2025-04-28 RX ORDER — EPHEDRINE SULFATE 50 MG/ML
5 INJECTION, SOLUTION INTRAVENOUS ONCE AS NEEDED
Status: DISCONTINUED | OUTPATIENT
Start: 2025-04-28 | End: 2025-04-28 | Stop reason: HOSPADM

## 2025-04-28 RX ORDER — TRANEXAMIC ACID 100 MG/ML
INJECTION, SOLUTION INTRAVENOUS AS NEEDED
Status: DISCONTINUED | OUTPATIENT
Start: 2025-04-28 | End: 2025-04-28 | Stop reason: SURG

## 2025-04-28 RX ORDER — ONDANSETRON 4 MG/1
4 TABLET, ORALLY DISINTEGRATING ORAL EVERY 6 HOURS PRN
Status: DISCONTINUED | OUTPATIENT
Start: 2025-04-28 | End: 2025-05-01 | Stop reason: HOSPADM

## 2025-04-28 RX ORDER — ASPIRIN 81 MG/1
81 TABLET ORAL EVERY 12 HOURS SCHEDULED
Status: DISCONTINUED | OUTPATIENT
Start: 2025-04-29 | End: 2025-05-01 | Stop reason: HOSPADM

## 2025-04-28 RX ORDER — ACETAMINOPHEN 500 MG
1000 TABLET ORAL ONCE
Status: COMPLETED | OUTPATIENT
Start: 2025-04-28 | End: 2025-04-28

## 2025-04-28 RX ORDER — SODIUM CHLORIDE, SODIUM LACTATE, POTASSIUM CHLORIDE, CALCIUM CHLORIDE 600; 310; 30; 20 MG/100ML; MG/100ML; MG/100ML; MG/100ML
9 INJECTION, SOLUTION INTRAVENOUS CONTINUOUS
Status: ACTIVE | OUTPATIENT
Start: 2025-04-28 | End: 2025-04-29

## 2025-04-28 RX ORDER — PROPOFOL 10 MG/ML
VIAL (ML) INTRAVENOUS AS NEEDED
Status: DISCONTINUED | OUTPATIENT
Start: 2025-04-28 | End: 2025-04-28 | Stop reason: SURG

## 2025-04-28 RX ORDER — METOPROLOL TARTRATE 25 MG/1
25 TABLET, FILM COATED ORAL 2 TIMES DAILY
Status: DISCONTINUED | OUTPATIENT
Start: 2025-04-28 | End: 2025-05-01 | Stop reason: HOSPADM

## 2025-04-28 RX ORDER — DIPHENHYDRAMINE HYDROCHLORIDE 50 MG/ML
25 INJECTION, SOLUTION INTRAMUSCULAR; INTRAVENOUS EVERY 6 HOURS PRN
Status: DISCONTINUED | OUTPATIENT
Start: 2025-04-28 | End: 2025-05-01 | Stop reason: HOSPADM

## 2025-04-28 RX ORDER — MELOXICAM 15 MG/1
15 TABLET ORAL DAILY
Status: DISCONTINUED | OUTPATIENT
Start: 2025-04-28 | End: 2025-05-01 | Stop reason: HOSPADM

## 2025-04-28 RX ORDER — PROMETHAZINE HYDROCHLORIDE 25 MG/1
25 TABLET ORAL ONCE AS NEEDED
Status: DISCONTINUED | OUTPATIENT
Start: 2025-04-28 | End: 2025-04-28 | Stop reason: HOSPADM

## 2025-04-28 RX ORDER — DEXAMETHASONE SODIUM PHOSPHATE 4 MG/ML
INJECTION, SOLUTION INTRA-ARTICULAR; INTRALESIONAL; INTRAMUSCULAR; INTRAVENOUS; SOFT TISSUE AS NEEDED
Status: DISCONTINUED | OUTPATIENT
Start: 2025-04-28 | End: 2025-04-28 | Stop reason: SURG

## 2025-04-28 RX ORDER — FAMOTIDINE 10 MG/ML
20 INJECTION, SOLUTION INTRAVENOUS ONCE
Status: COMPLETED | OUTPATIENT
Start: 2025-04-28 | End: 2025-04-28

## 2025-04-28 RX ORDER — PROMETHAZINE HYDROCHLORIDE 25 MG/1
25 SUPPOSITORY RECTAL ONCE AS NEEDED
Status: DISCONTINUED | OUTPATIENT
Start: 2025-04-28 | End: 2025-04-28 | Stop reason: HOSPADM

## 2025-04-28 RX ORDER — FENTANYL CITRATE 50 UG/ML
INJECTION, SOLUTION INTRAMUSCULAR; INTRAVENOUS AS NEEDED
Status: DISCONTINUED | OUTPATIENT
Start: 2025-04-28 | End: 2025-04-28 | Stop reason: SURG

## 2025-04-28 RX ORDER — ONDANSETRON 2 MG/ML
4 INJECTION INTRAMUSCULAR; INTRAVENOUS ONCE AS NEEDED
Status: DISCONTINUED | OUTPATIENT
Start: 2025-04-28 | End: 2025-04-28 | Stop reason: HOSPADM

## 2025-04-28 RX ORDER — GABAPENTIN 300 MG/1
600 CAPSULE ORAL NIGHTLY
Status: DISCONTINUED | OUTPATIENT
Start: 2025-04-28 | End: 2025-05-01 | Stop reason: HOSPADM

## 2025-04-28 RX ORDER — CELECOXIB 200 MG/1
200 CAPSULE ORAL ONCE
Status: COMPLETED | OUTPATIENT
Start: 2025-04-28 | End: 2025-04-28

## 2025-04-28 RX ORDER — OXYCODONE HYDROCHLORIDE 5 MG/1
5 TABLET ORAL ONCE
Status: COMPLETED | OUTPATIENT
Start: 2025-04-28 | End: 2025-04-28

## 2025-04-28 RX ORDER — CEFAZOLIN SODIUM 1 G/3ML
INJECTION, POWDER, FOR SOLUTION INTRAMUSCULAR; INTRAVENOUS AS NEEDED
Status: DISCONTINUED | OUTPATIENT
Start: 2025-04-28 | End: 2025-04-28 | Stop reason: HOSPADM

## 2025-04-28 RX ORDER — NICOTINE POLACRILEX 4 MG
15 LOZENGE BUCCAL
Status: DISCONTINUED | OUTPATIENT
Start: 2025-04-28 | End: 2025-05-01 | Stop reason: HOSPADM

## 2025-04-28 RX ORDER — PREGABALIN 75 MG/1
150 CAPSULE ORAL ONCE
Status: COMPLETED | OUTPATIENT
Start: 2025-04-28 | End: 2025-04-28

## 2025-04-28 RX ORDER — DIPHENHYDRAMINE HCL 25 MG
50 CAPSULE ORAL EVERY 6 HOURS PRN
Status: DISCONTINUED | OUTPATIENT
Start: 2025-04-28 | End: 2025-05-01 | Stop reason: HOSPADM

## 2025-04-28 RX ORDER — ONDANSETRON 2 MG/ML
4 INJECTION INTRAMUSCULAR; INTRAVENOUS EVERY 6 HOURS PRN
Status: DISCONTINUED | OUTPATIENT
Start: 2025-04-28 | End: 2025-05-01 | Stop reason: HOSPADM

## 2025-04-28 RX ORDER — OXYCODONE AND ACETAMINOPHEN 7.5; 325 MG/1; MG/1
1 TABLET ORAL EVERY 4 HOURS PRN
Status: DISCONTINUED | OUTPATIENT
Start: 2025-04-28 | End: 2025-04-28 | Stop reason: HOSPADM

## 2025-04-28 RX ORDER — HYDROCODONE BITARTRATE AND ACETAMINOPHEN 5; 325 MG/1; MG/1
1 TABLET ORAL ONCE AS NEEDED
Status: DISCONTINUED | OUTPATIENT
Start: 2025-04-28 | End: 2025-04-28 | Stop reason: HOSPADM

## 2025-04-28 RX ORDER — NALOXONE HCL 0.4 MG/ML
0.1 VIAL (ML) INJECTION
Status: DISCONTINUED | OUTPATIENT
Start: 2025-04-28 | End: 2025-05-01 | Stop reason: HOSPADM

## 2025-04-28 RX ORDER — LIDOCAINE HYDROCHLORIDE 10 MG/ML
INJECTION, SOLUTION INFILTRATION; PERINEURAL AS NEEDED
Status: DISCONTINUED | OUTPATIENT
Start: 2025-04-28 | End: 2025-04-28 | Stop reason: SURG

## 2025-04-28 RX ORDER — ROCURONIUM BROMIDE 10 MG/ML
INJECTION, SOLUTION INTRAVENOUS AS NEEDED
Status: DISCONTINUED | OUTPATIENT
Start: 2025-04-28 | End: 2025-04-28 | Stop reason: SURG

## 2025-04-28 RX ORDER — VANCOMYCIN HYDROCHLORIDE 1 G/20ML
INJECTION, POWDER, LYOPHILIZED, FOR SOLUTION INTRAVENOUS AS NEEDED
Status: DISCONTINUED | OUTPATIENT
Start: 2025-04-28 | End: 2025-04-28 | Stop reason: HOSPADM

## 2025-04-28 RX ORDER — HYDRALAZINE HYDROCHLORIDE 20 MG/ML
5 INJECTION INTRAMUSCULAR; INTRAVENOUS
Status: DISCONTINUED | OUTPATIENT
Start: 2025-04-28 | End: 2025-04-28 | Stop reason: HOSPADM

## 2025-04-28 RX ADMIN — PROPOFOL 200 MG: 10 INJECTION, EMULSION INTRAVENOUS at 09:42

## 2025-04-28 RX ADMIN — DEXAMETHASONE SODIUM PHOSPHATE 8 MG: 4 INJECTION, SOLUTION INTRA-ARTICULAR; INTRALESIONAL; INTRAMUSCULAR; INTRAVENOUS; SOFT TISSUE at 09:42

## 2025-04-28 RX ADMIN — CELECOXIB 200 MG: 200 CAPSULE ORAL at 09:03

## 2025-04-28 RX ADMIN — HYDROMORPHONE HYDROCHLORIDE 0.5 MG: 1 INJECTION, SOLUTION INTRAMUSCULAR; INTRAVENOUS; SUBCUTANEOUS at 11:04

## 2025-04-28 RX ADMIN — MELOXICAM 15 MG: 15 TABLET ORAL at 15:54

## 2025-04-28 RX ADMIN — GABAPENTIN 600 MG: 300 CAPSULE ORAL at 20:42

## 2025-04-28 RX ADMIN — TRANEXAMIC ACID 1000 MG: 100 INJECTION INTRAVENOUS at 09:49

## 2025-04-28 RX ADMIN — METOPROLOL TARTRATE 25 MG: 25 TABLET, FILM COATED ORAL at 09:33

## 2025-04-28 RX ADMIN — HYDROMORPHONE HYDROCHLORIDE 0.5 MG: 1 INJECTION, SOLUTION INTRAMUSCULAR; INTRAVENOUS; SUBCUTANEOUS at 11:37

## 2025-04-28 RX ADMIN — HYDROMORPHONE HYDROCHLORIDE 0.5 MG: 1 INJECTION, SOLUTION INTRAMUSCULAR; INTRAVENOUS; SUBCUTANEOUS at 10:02

## 2025-04-28 RX ADMIN — OXYCODONE HYDROCHLORIDE 5 MG: 5 TABLET ORAL at 09:03

## 2025-04-28 RX ADMIN — HYDRALAZINE HYDROCHLORIDE 10 MG: 20 INJECTION INTRAMUSCULAR; INTRAVENOUS at 11:48

## 2025-04-28 RX ADMIN — ACETAMINOPHEN 1000 MG: 500 TABLET, FILM COATED ORAL at 20:42

## 2025-04-28 RX ADMIN — CEFAZOLIN 2000 MG: 2 INJECTION, POWDER, FOR SOLUTION INTRAMUSCULAR; INTRAVENOUS at 20:37

## 2025-04-28 RX ADMIN — Medication 3 ML: at 09:22

## 2025-04-28 RX ADMIN — SUGAMMADEX 200 MG: 100 INJECTION, SOLUTION INTRAVENOUS at 11:25

## 2025-04-28 RX ADMIN — HYDROMORPHONE HYDROCHLORIDE 0.5 MG: 1 INJECTION, SOLUTION INTRAMUSCULAR; INTRAVENOUS; SUBCUTANEOUS at 12:03

## 2025-04-28 RX ADMIN — SODIUM CHLORIDE, POTASSIUM CHLORIDE, SODIUM LACTATE AND CALCIUM CHLORIDE 9 ML/HR: 600; 310; 30; 20 INJECTION, SOLUTION INTRAVENOUS at 11:45

## 2025-04-28 RX ADMIN — SODIUM CHLORIDE, POTASSIUM CHLORIDE, SODIUM LACTATE AND CALCIUM CHLORIDE 9 ML/HR: 600; 310; 30; 20 INJECTION, SOLUTION INTRAVENOUS at 09:04

## 2025-04-28 RX ADMIN — HYDROMORPHONE HYDROCHLORIDE 0.5 MG: 1 INJECTION, SOLUTION INTRAMUSCULAR; INTRAVENOUS; SUBCUTANEOUS at 11:44

## 2025-04-28 RX ADMIN — FAMOTIDINE 20 MG: 10 INJECTION, SOLUTION INTRAVENOUS at 09:33

## 2025-04-28 RX ADMIN — ONDANSETRON 4 MG: 2 INJECTION, SOLUTION INTRAMUSCULAR; INTRAVENOUS at 11:04

## 2025-04-28 RX ADMIN — INSULIN LISPRO 2 UNITS: 100 INJECTION, SOLUTION INTRAVENOUS; SUBCUTANEOUS at 20:50

## 2025-04-28 RX ADMIN — LIDOCAINE HYDROCHLORIDE 100 MG: 10 INJECTION, SOLUTION INFILTRATION; PERINEURAL at 09:42

## 2025-04-28 RX ADMIN — CEFAZOLIN 2000 MG: 2 INJECTION, POWDER, FOR SOLUTION INTRAMUSCULAR; INTRAVENOUS at 09:35

## 2025-04-28 RX ADMIN — OXYCODONE HYDROCHLORIDE 10 MG: 5 TABLET ORAL at 20:50

## 2025-04-28 RX ADMIN — DEXMEDETOMIDINE HCL 10 MCG: 100 INJECTION INTRAVENOUS at 10:23

## 2025-04-28 RX ADMIN — ACETAMINOPHEN 1000 MG: 500 TABLET, FILM COATED ORAL at 09:03

## 2025-04-28 RX ADMIN — ROCURONIUM BROMIDE 50 MG: 10 INJECTION INTRAVENOUS at 09:42

## 2025-04-28 RX ADMIN — OXYCODONE HYDROCHLORIDE 10 MG: 5 TABLET ORAL at 15:54

## 2025-04-28 RX ADMIN — DEXMEDETOMIDINE HCL 10 MCG: 100 INJECTION INTRAVENOUS at 10:09

## 2025-04-28 RX ADMIN — PREGABALIN 150 MG: 75 CAPSULE ORAL at 09:03

## 2025-04-28 RX ADMIN — FENTANYL CITRATE 50 MCG: 50 INJECTION, SOLUTION INTRAMUSCULAR; INTRAVENOUS at 09:48

## 2025-04-28 RX ADMIN — OXYCODONE AND ACETAMINOPHEN 1 TABLET: 7.5; 325 TABLET ORAL at 11:36

## 2025-04-28 RX ADMIN — ACETAMINOPHEN 1000 MG: 500 TABLET, FILM COATED ORAL at 15:55

## 2025-04-28 NOTE — SIGNIFICANT NOTE
Ultrasound Inserted IV Site:lfa    Catheter Length:1.88in    Diameter:0.35    Depth:0.75      Vascular Access Score=5  1) Palpable / Visible / Dis  2) Palpable / Viasible / Not Distended  3) Easily Palpable / Not Visibile  4) Poorly Palpable / Visible  5) Poorly / Nonpalpable / NV

## 2025-04-28 NOTE — H&P
Orthopaedic Surgery  History & Physical  Dr. BEBETO Ansari II  [phone]    HPI:  Patient is a 59 y.o. Not  or  female who presents with A failed right total knee and is here today for revision surgery    MEDICAL HISTORY  Past Medical History:   Diagnosis Date   • Arthritis    • Asthma    • COPD (chronic obstructive pulmonary disease)    • Degenerative disc disease, lumbar     CHRONIC   • Diabetes mellitus    • GERD (gastroesophageal reflux disease)    • Hyperlipidemia    • Hypertension    • Neuropathy     CONOR FEET   • Osteoarthritis     LEFT KNEE   • Positive colorectal cancer screening using Cologuard test    • PVD (peripheral vascular disease)    • Seasonal allergies    • Sleep apnea     NO CURRENT DEVICE     Past Surgical History:   Procedure Laterality Date   • AMPUTATION DIGIT Right 05/02/2024    Procedure: SECOND AMPUTATION DIGIT, RIGHT FOOT;  Surgeon: Turner Portillo DPM;  Location: [facility] OR;  Service: Podiatry;  Laterality: Right;   • APPENDECTOMY     • ARTHROPLASTY Right     KNEE   • ECTOPIC PREGNANCY     • JOINT REPLACEMENT Bilateral     KNEE ARTHROPLASTY   • LAPAROSCOPIC TUBAL LIGATION     • TOTAL KNEE ARTHROPLASTY REVISION Left 10/02/2023    Procedure: TOTAL KNEE ARTHROPLASTY REVISION WITH CORI ROBOT;  Surgeon: Luis Ansari II, MD;  Location: [facility];  Service: Robotics - Ortho;  Laterality: Left;   • WISDOM TOOTH EXTRACTION       Prior to Admission medications    Medication Sig Start Date End Date Taking? Authorizing Provider   albuterol sulfate  (90 Base) MCG/ACT inhaler Inhale 2 puffs Every 4 (Four) Hours As Needed. 9/21/23  Yes Juancarlos Forde MD   CHLORHEXIDINE GLUCONATE CLOTH EX Apply  topically. USE AS DIRECTED   Yes Juancarlos Forde MD   Cholecalciferol 50 MCG (2000 UT) tablet Take 1 tablet by mouth Daily. HOLDING FOR DOS   Yes Juancarlos Forde MD   Cyanocobalamin 1000 MCG/ML kit Inject 1 mL into the appropriate muscle as  directed by prescriber Every 30 (Thirty) Days. STATES SHE IS DUE AFTER THE 1ST 9/21/23  Yes Juancarlos Forde MD   gabapentin (NEURONTIN) 600 MG tablet Take 800 mg by mouth At Night As Needed. 8/19/23  Yes Juancarlos Forde MD   guaifenesin-dextromethorphan 600-30 mg (MUCINEX DM)  MG tablet sustained-release 12 hour Take 2 tablets by mouth 2 (Two) Times a Day As Needed (Cough). 3/18/25  Yes Kristofer Hair PA   HYDROcodone-acetaminophen (NORCO) 7.5-325 MG per tablet Take 1 tablet by mouth Every 6 (Six) Hours As Needed for Moderate Pain.   Yes Juancarlos Forde MD   Jardiance 10 MG tablet tablet Take 1 tablet by mouth Daily. Indications: Type 2 Diabetes 9/21/23  Yes Juancarlos Forde MD   loratadine (CLARITIN) 10 MG tablet Take 1 tablet by mouth Daily. 9/21/23  Yes Juancarlos Forde MD   metoprolol tartrate (LOPRESSOR) 25 MG tablet Take 1 tablet by mouth 2 (Two) Times a Day. 9/21/23  Yes Juancarlos Fored MD   nitroglycerin (NITROSTAT) 0.4 MG SL tablet Place 1 tablet under the tongue Every 5 (Five) Minutes As Needed. 9/21/23  Yes Juancarlos Forde MD   oxyCODONE-acetaminophen (PERCOCET) 5-325 MG per tablet Take 1 tablet by mouth Every 4 (Four) Hours As Needed for Severe Pain.  Patient taking differently: Take 1 tablet by mouth Every 4 (Four) Hours As Needed for Severe Pain. Not taking  Indications: Pain 10/3/23  Yes Luis Ansari II, MD   TiZANidine (ZANAFLEX) 2 MG capsule Take 1 capsule by mouth 3 (Three) Times a Day.   Yes Juancarlos Forde MD   acyclovir (ZOVIRAX) 400 MG tablet Take 1 tablet by mouth Daily As Needed. 8/23/23   Juancarlos Forde MD   metFORMIN (GLUCOPHAGE) 850 MG tablet Take 1 tablet by mouth 2 (Two) Times a Day With Meals. NOT CURRENTLY TAKING  Indications: Type 2 Diabetes 9/21/23   Juancarlos Forde MD   naproxen (NAPROSYN) 500 MG tablet Take 1 tablet by mouth 2 (Two) Times a Day As Needed for Mild Pain. 3/18/25   Kristofer Hair PA    Omega-3 Fatty Acids (fish oil) 1200 MG capsule capsule Take 1 capsule by mouth Daily With Breakfast. HOLDING FOR DOS 8/21/23   ProviderJuancarlos MD   ondansetron ODT (ZOFRAN-ODT) 4 MG disintegrating tablet Place 1 tablet on the tongue Every 8 (Eight) Hours As Needed for Nausea or Vomiting. 8/31/24   Yamila Lockett APRN   oseltamivir (Tamiflu) 75 MG capsule Take 1 capsule by mouth 2 (Two) Times a Day. 3/18/25   Kristofer Hair PA   simvastatin (ZOCOR) 10 MG tablet Take 1 tablet by mouth Every Night. 9/21/23   ProviderJuancarlos MD     Allergies   Allergen Reactions   • Latex Rash     Most Recent Immunizations   Administered Date(s) Administered   • Fluzone High-Dose 65+yrs 08/28/2016   • Influenza TIV (IM) 08/28/2016     Social History     Tobacco Use   • Smoking status: Every Day     Current packs/day: 0.50     Average packs/day: 0.5 packs/day for 91.3 years (45.7 ttl pk-yrs)     Types: Cigarettes     Start date: 1979   • Smokeless tobacco: Never   Substance Use Topics   • Alcohol use: Yes     Comment: SOCIALLY      Social History     Substance and Sexual Activity   Drug Use Never       REVIEW OF SYSTEMS:  Head: negative for headache  Respiratory: negative for shortness of breath.   Cardiovascular: negative for chest pain.   Gastrointestinal: negative abdominal pain.   Neurological: negative for LOC  Psychiatric/Behavioral: negative for memory loss.   All other systems reviewed and are negative    VITALS: There were no vitals taken for this visit. There is no height or weight on file to calculate BMI.    PHYSICAL EXAM:   CONSTITUTIONAL: A&Ox3, No acute distress  LUNGS: Equal chest rise, no shortness of air  CARDIOVASCULAR: palpable peripheral pulses  SKIN: no skin lesions in the area examined  LYMPH: no lymphadenopathy in the area examined  Right knee incision well-healed.  No erythema or warmth.  Normal neurovascular exam    RADIOLOGY REVIEW:   XR Chest PA & Lateral  Result Date: 4/23/2025  No focal  "pulmonary consolidation. Tortuous aorta. Follow-up as clinically indicated.  This report was finalized on 4/23/2025 8:21 AM by Dr. Avery Olivo M.D on Workstation: [id number]      XR Knee 1 or 2 View Right  Result Date: 4/23/2025   Prior knee arthroplasty, no acute findings.  This report was finalized on 4/23/2025 2:08 AM by Dr. Lawrence Ackerman M.D on Workstation: [id number]      LABS:   Results for the past 24 hours:   Recent Results (from the past 24 hours)   POC Glucose Once    Collection Time: 04/28/25  8:17 AM    Specimen: Blood   Result Value Ref Range    Glucose 114 70 - 130 mg/dL       IMPRESSION:  Patient is a 59 y.o. Not  or  female with Right failed total knee arthroplasty    PLAN:   Admited to: Luis Ansari II, MD  Plan: Right total knee arthroplasty revision today    R \"Marino\" Elan GONZALEZ MD  Orthopaedic Surgery  [facility]  [phone] - [place] Office  [phone] - [place] Office    "

## 2025-04-28 NOTE — CASE MANAGEMENT/SOCIAL WORK
Continued Stay Note  [facility]     Patient Name: Yenny Crawford  MRN: [id number]  Today's Date: 4/28/2025    Admit Date: 4/28/2025    Plan: awaiting pt preference for another HH   Discharge Plan       Row Name 04/28/25 1009       Plan    Plan awaiting pt preference for another HH    Plan Comments Miroslava/HAYDE updated CCP CANNOT accept pt. Pt will need another HH. MELISSA Tracey/ALISA      Row Name 04/28/25 0900       Plan    Plan per ortho careplan: home with [facility], referral pending    Patient/Family in Agreement with Plan yes    Plan Comments CCP received care plan fax from Cara/Dr. Noble's office stating pt has preselected [facility]. DCP report created. Epic referral placed. CCP notified  Miroslava Whitaker/HAYDE. DC plan per ortho careplan: home with [facility], referral pending. MELISSA Tracey/ALISA Batista RN

## 2025-04-28 NOTE — PLAN OF CARE
Goal Outcome Evaluation:  Plan of Care Reviewed With: patient        Progress: improving  Outcome Evaluation: Admit s/p RTKA revision. A&Ox4. VSS. Dressing CDI. Ambulated on arrival to unit. Assist x1 and walker. BRP, voiding function intact. Increased pain, PRN medications explained and ice packs provided. Plans to D/C home with HH when ready. Education provided on monitoring O2 r/t to hx of ANIBAL and surgery.

## 2025-04-28 NOTE — PROGRESS NOTES
Continued Stay Note  [facility]     Patient Name: Yenny Crawford  MRN: [id number]  Today's Date: 4/28/2025    Admit Date: 4/28/2025    Plan: [facility]    Discharge Plan       Row Name 04/28/25 1647       Plan    Plan [facility]     Plan Comments Addtional HH referral sent in EPIC.  Followed up with pt at bedside and did inform pt that [facility] unable to staff pt at tthis time.  Informed pt that [facility] can accpet pt for home PT at DC.  Pt agreeable.  Pt did confirm her facesheet information as correct including PCP as Dr. Juana Ortega.  pt's family can transport pt home at DC.  [facility] with [facility]  did contact [place]                   Discharge Codes    No documentation.                 Expected Discharge Date and Time       Expected Discharge Date Expected Discharge Time    Apr 29, 2025               WILTON Ying

## 2025-04-28 NOTE — ANESTHESIA POSTPROCEDURE EVALUATION
Patient: Yenny Crawford    Procedure Summary       Date: 04/28/25 Room / Location: [facility] OR [address] [place] [address] [facility]    Anesthesia Start: 0938 Anesthesia Stop: 1136    Procedure: RIGHT TOTAL KNEE REVISION (Right: Knee) Diagnosis:     Surgeons: Luis Ansari II, MD Provider: Max Shine MD    Anesthesia Type: general ASA Status: 3            Anesthesia Type: general    Vitals  Vitals Value Taken Time   /85 04/28/25 12:15   Temp     Pulse 66 04/28/25 12:28   Resp 14 04/28/25 12:15   SpO2 100 % 04/28/25 12:28   Vitals shown include unfiled device data.        Post Anesthesia Care and Evaluation    Level of consciousness: awake and alert  Pain management: adequate    Airway patency: patent  Anesthetic complications: No anesthetic complications  PONV Status: controlled  Cardiovascular status: blood pressure returned to baseline and acceptable  Respiratory status: acceptable  Hydration status: acceptable

## 2025-04-28 NOTE — DISCHARGE PLACEMENT REQUEST
"Yenny Berg (59 y.o. Female)       Date of Birth   1965    Social Security Number       Address   [address] [place] [id number]    Home Phone   [phone]    MRN   [id number]       [facility]    Marital Status                               Admission Date   4/28/2025    Admission Type   Elective    Admitting Provider   Luis Ansari II, MD    Attending Provider   Luis Ansari II, MD    Department, Room/Bed   [facility] OSC OR, OSC OR/OSC OR       Discharge Date       Discharge Disposition       Discharge Destination                                 Attending Provider: Luis Ansari II, MD    Allergies: Latex    Isolation: None   Infection: None   Code Status: Prior    Ht: 171.5 cm (67.5\")   Wt: 101 kg (222 lb 12.8 oz)    Admission Cmt: None   Principal Problem: None                  Active Insurance as of 4/28/2025       Primary Coverage       Payor Plan Insurance Group Employer/Plan Group    [facility] BY GOSIA [id number]       Payor Plan Address Payor Plan Phone Number Payor Plan Fax Number Effective Dates    [address] [address]   1/1/2021 - None Entered    [facility] [address]         Subscriber Name Subscriber Birth Date Member ID       YENNY BERG 1965 [id number]                     Emergency Contacts        (Rel.) Home Phone Work Phone Mobile Phone    ADAMA CELAYA (Daughter) [phone] -- [phone]    HUSSEIN HARPER (Son) [phone] -- [phone]       "

## 2025-04-28 NOTE — CASE MANAGEMENT/SOCIAL WORK
Continued Stay Note  [facility]     Patient Name: Yenny Crawford  MRN: [id number]  Today's Date: 4/28/2025    Admit Date: 4/28/2025    Plan: per ortho careplan: home with [facility], referral pending   Discharge Plan       Row Name 04/28/25 0900       Plan    Plan per ortho careplan: home with [facility], referral pending    Patient/Family in Agreement with Plan yes    Plan Comments CCP received care plan fax from Cara/Dr. Noble's office stating pt has preselected [facility]. DCP report created. Epic referral placed. CCP notified  Miroslava Whitaker/[facility]. DC plan per ortho careplan: home with [facility], referral pending. MELISSA Tracey/ALISA Batista RN

## 2025-04-28 NOTE — ANESTHESIA PROCEDURE NOTES
Airway  Reason: elective    Date/Time: 4/28/2025 9:46 AM  Airway not difficult    General Information and Staff    Patient location during procedure: OR  Anesthesiologist: Max Shine MD  CRNA/CAA: Iliana Alegre CRNA    Indications and Patient Condition  Indications for airway management: airway protection    Preoxygenated: yes    Mask difficulty assessment: 2 - vent by mask + OA or adjuvant +/- NMBA    Final Airway Details    Final airway type: endotracheal airway      Successful airway: ETT  Cuffed: yes   Successful intubation technique: direct laryngoscopy  Adjuncts used in placement: intubating stylet  Endotracheal tube insertion site: oral  Blade: Anai  Blade size: 3  ETT size (mm): 7.0  Cormack-Lehane Classification: grade I - full view of glottis  Placement verified by: chest auscultation and capnometry   Cuff volume (mL): 6  Measured from: teeth  ETT/EBT  to teeth (cm): 21  Number of attempts at approach: 1  Assessment: lips, teeth, and gum same as pre-op and atraumatic intubation

## 2025-04-28 NOTE — CONSULTS
Patient Name:  Yenny Crawford  YOB: 1965  MRN:  [id number]  Date of Admission:  4/28/2025  Date of Consult:  4/28/2025  Patient Care Team:  Kassy Antonio APRN as PCP - General (Family Medicine)    Inpatient Hospitalist Consult  Consult performed by: Clint Pereira MD  Consult ordered by: Luis Ansari II, MD  Reason for consult: Evaluate status and make recommendations regarding treatment for diabetes and hypertension.        Subjective   History of Present Illness  Ms. Crawford is a 59 y.o. female that has been admitted to [facility] following elective RIGHT TOTAL KNEE REVISION.  She has been admitted to an orthopedic floor following surgery and we were asked to see and assist with her medical problems, specifically relating to her diabetes.  At the time of my visit she denies any chest pain, SOA, nausea, vomiting or diarrhea.  She has tolerated a diet.  She does complain of expected postoperative discomfort.  She reports being in a normal state of health leading up to surgery.        Past Medical History:   Diagnosis Date    Arthritis     Asthma     COPD (chronic obstructive pulmonary disease)     Degenerative disc disease, lumbar     CHRONIC    Diabetes mellitus     GERD (gastroesophageal reflux disease)     Hyperlipidemia     Hypertension     Neuropathy     CONOR FEET    Osteoarthritis     LEFT KNEE    Positive colorectal cancer screening using Cologuard test     PVD (peripheral vascular disease)     Seasonal allergies     Sleep apnea     NO CURRENT DEVICE     Past Surgical History:   Procedure Laterality Date    AMPUTATION DIGIT Right 05/02/2024    Procedure: SECOND AMPUTATION DIGIT, RIGHT FOOT;  Surgeon: Turner Portillo DPM;  Location: [facility];  Service: Podiatry;  Laterality: Right;    APPENDECTOMY      ARTHROPLASTY Right     KNEE    ECTOPIC PREGNANCY      JOINT REPLACEMENT Bilateral     KNEE ARTHROPLASTY    LAPAROSCOPIC TUBAL LIGATION      TOTAL  KNEE ARTHROPLASTY REVISION Left 10/02/2023    Procedure: TOTAL KNEE ARTHROPLASTY REVISION WITH CORI ROBOT;  Surgeon: Luis Ansari II, MD;  Location: [facility];  Service: Robotics - Ortho;  Laterality: Left;    WISDOM TOOTH EXTRACTION       Family History   Problem Relation Age of Onset    Malig Hyperthermia Neg Hx      Social History     Tobacco Use    Smoking status: Every Day     Current packs/day: 0.50     Average packs/day: 0.5 packs/day for 91.3 years (45.7 ttl pk-yrs)     Types: Cigarettes     Start date: 1979    Smokeless tobacco: Never   Vaping Use    Vaping status: Former   Substance Use Topics    Alcohol use: Yes     Comment: SOCIALLY    Drug use: Never     Medications Prior to Admission   Medication Sig Dispense Refill Last Dose/Taking    albuterol sulfate  (90 Base) MCG/ACT inhaler Inhale 2 puffs Every 4 (Four) Hours As Needed.   4/27/2025    CHLORHEXIDINE GLUCONATE CLOTH EX Apply  topically. USE AS DIRECTED   4/28/2025 Morning    Cholecalciferol 50 MCG (2000 UT) tablet Take 1 tablet by mouth Daily. HOLDING FOR DOS   Past Week    Cyanocobalamin 1000 MCG/ML kit Inject 1 mL into the appropriate muscle as directed by prescriber Every 30 (Thirty) Days. STATES SHE IS DUE AFTER THE 1ST   Past Month    gabapentin (NEURONTIN) 600 MG tablet Take 800 mg by mouth At Night As Needed.   4/27/2025    guaifenesin-dextromethorphan 600-30 mg (MUCINEX DM)  MG tablet sustained-release 12 hour Take 2 tablets by mouth 2 (Two) Times a Day As Needed (Cough). 20 each 0 Past Month    HYDROcodone-acetaminophen (NORCO) 7.5-325 MG per tablet Take 1 tablet by mouth Every 6 (Six) Hours As Needed for Moderate Pain.   4/27/2025    Jardiance 10 MG tablet tablet Take 1 tablet by mouth Daily. Indications: Type 2 Diabetes   Past Week    loratadine (CLARITIN) 10 MG tablet Take 1 tablet by mouth Daily.   Past Month    metoprolol tartrate (LOPRESSOR) 25 MG tablet Take 1 tablet by mouth 2 (Two) Times a Day.    4/27/2025    nitroglycerin (NITROSTAT) 0.4 MG SL tablet Place 1 tablet under the tongue Every 5 (Five) Minutes As Needed.   Past Month    oxyCODONE-acetaminophen (PERCOCET) 5-325 MG per tablet Take 1 tablet by mouth Every 4 (Four) Hours As Needed for Severe Pain. (Patient taking differently: Take 1 tablet by mouth Every 4 (Four) Hours As Needed for Severe Pain. Not taking  Indications: Pain) 50 tablet 0 Patient Taking Differently    TiZANidine (ZANAFLEX) 2 MG capsule Take 1 capsule by mouth 3 (Three) Times a Day.   4/27/2025    acyclovir (ZOVIRAX) 400 MG tablet Take 1 tablet by mouth Daily As Needed.   More than a month    metFORMIN (GLUCOPHAGE) 850 MG tablet Take 1 tablet by mouth 2 (Two) Times a Day With Meals. NOT CURRENTLY TAKING  Indications: Type 2 Diabetes   Unknown    naproxen (NAPROSYN) 500 MG tablet Take 1 tablet by mouth 2 (Two) Times a Day As Needed for Mild Pain. 15 tablet 0 More than a month    Omega-3 Fatty Acids (fish oil) 1200 MG capsule capsule Take 1 capsule by mouth Daily With Breakfast. HOLDING FOR DOS   More than a month    ondansetron ODT (ZOFRAN-ODT) 4 MG disintegrating tablet Place 1 tablet on the tongue Every 8 (Eight) Hours As Needed for Nausea or Vomiting. 15 tablet 0 More than a month    oseltamivir (Tamiflu) 75 MG capsule Take 1 capsule by mouth 2 (Two) Times a Day. 10 capsule 0 More than a month    simvastatin (ZOCOR) 10 MG tablet Take 1 tablet by mouth Every Night.   More than a month     Allergies:  Latex    Review of Systems    Objective      Vital Signs  Temp:  [97.6 °F (36.4 °C)-98.2 °F (36.8 °C)] 98.2 °F (36.8 °C)  Heart Rate:  [58-80] 66  Resp:  [14-20] 14  BP: (147-188)/() 148/90  Body mass index is 34.34 kg/m².    Physical Exam  Vitals and nursing note reviewed.   Eyes:      General: No scleral icterus.  Cardiovascular:      Rate and Rhythm: Normal rate and regular rhythm.   Pulmonary:      Effort: Pulmonary effort is normal.      Breath sounds: Normal breath sounds.    Abdominal:      General: Bowel sounds are normal.      Palpations: Abdomen is soft.      Tenderness: There is no abdominal tenderness.   Musculoskeletal:         General: Tenderness present.      Comments: Surgical wound dressed, ROM not tested   Skin:     General: Skin is warm and dry.      Findings: No rash.   Neurological:      Mental Status: She is alert. Mental status is at baseline.         Results Review:   I reviewed the patient's new clinical results.  I reviewed the patient's new imaging results and agree with the interpretation.  I reviewed the patient's other test results and agree with the interpretation         Assessment/Plan     Active Hospital Problems    Diagnosis  POA    **Knee joint replacement status [Z96.659]  Not Applicable    Obesity (BMI 30-39.9) [E66.9]  Yes    HTN (hypertension) [I10]  Yes    CAD (coronary artery disease) [I25.10]  Yes    COPD (chronic obstructive pulmonary disease) [J44.9]  Yes    Type 2 diabetes mellitus with hyperglycemia, without long-term current use of insulin [E11.65]  Yes    Tobacco abuse [Z72.0]  Yes      Resolved Hospital Problems   No resolved problems to display.       Ms. Crawford is a 59 y.o. female who is s/p RIGHT TOTAL KNEE REVISION.    She seems to be doing well thus far postoperatively.   Continue home oral diabetic medications.  HUMALOG - moderate dose correctional factor as needed.  Monitor glucose TID AC. For any BG less than 70 mg/dL, treat per hospital protocol  HgbA1C 6.2% last week indicating good control of diabetes.  BMP ordered for in the morning.  Will review.    NCS diet.  Most recent BP is 148/90.  It was as high as 188/99 postop for which she received hydralazine 10 mg IV. Anticipate fluctuations in BP due to blood loss, hypovolemia, anesthesia, narcotics etc.   Continue IVFs as ordered.    Monitor renal function.  DVT prophylaxis per surgery.  She was encouraged to use incentive spirometer as instructed.      Thank you very much for asking  A to be involved in this patient's care. We will follow along with you.      Clint Pereira MD  [facility]  04/28/25  13:49 EDT

## 2025-04-28 NOTE — LETTER
April 28, 2025     Patient: Yenny Crawford   YOB: 1965   Date of Visit: 4/16/2025       To Whom it May Concern:    Yenny Crawford was had surgery on 4/28/2025. Paula Wheeler was unable to attend school on 4/28/2025 due to logistics of her grandmother's transportation to her procedure.     If you have any questions or concerns, please don't hesitate to call.         Sincerely,          No name on file        CC: No Recipients

## 2025-04-28 NOTE — PLAN OF CARE
Goal Outcome Evaluation:  Plan of Care Reviewed With: patient           Outcome Evaluation: Pt seen for PT tito this afternoon. She is POD 0 R TKA revision and presents w expected post op pain and weakness. Pt doing well. She is able to transition to EOB w SBA. She stood w SBA/CGA using Rwx and was able to ambulate approx 80 ft. No unsteadiness noted. Pt demos step through gait pattern. Pt agreeable to sit up in recliner at end of session. She tolerated all knee exercises well. Pt plans home upon DC w assist of family and HHPT. She will continue to benefit from skilled PT to maximize safety, strength, and independence w mobility.    Anticipated Discharge Disposition (PT): home with assist, home with home health

## 2025-04-28 NOTE — THERAPY EVALUATION
Patient Name: Yenny Crawford  : 1965    MRN: [id number]                              Today's Date: 2025       Admit Date: 2025    Visit Dx: No diagnosis found.  Patient Active Problem List   Diagnosis    Status post knee replacement    COPD (chronic obstructive pulmonary disease)    CAD (coronary artery disease)    Tobacco abuse    HTN (hypertension)    Type 2 diabetes mellitus with hyperglycemia, without long-term current use of insulin    Peripheral neuropathy    Chronic back pain    Cellulitis of toe of right foot    Bipolar disorder, current episode depressed, mild    Osteomyelitis of toe    Knee joint replacement status    Obesity (BMI 30-39.9)     Past Medical History:   Diagnosis Date    Arthritis     Asthma     COPD (chronic obstructive pulmonary disease)     Degenerative disc disease, lumbar     CHRONIC    Diabetes mellitus     GERD (gastroesophageal reflux disease)     Hyperlipidemia     Hypertension     Neuropathy     CONOR FEET    Osteoarthritis     LEFT KNEE    Positive colorectal cancer screening using Cologuard test     PVD (peripheral vascular disease)     Seasonal allergies     Sleep apnea     NO CURRENT DEVICE     Past Surgical History:   Procedure Laterality Date    AMPUTATION DIGIT Right 2024    Procedure: SECOND AMPUTATION DIGIT, RIGHT FOOT;  Surgeon: Turner Portillo DPM;  Location: [facility];  Service: Podiatry;  Laterality: Right;    APPENDECTOMY      ARTHROPLASTY Right     KNEE    ECTOPIC PREGNANCY      JOINT REPLACEMENT Bilateral     KNEE ARTHROPLASTY    LAPAROSCOPIC TUBAL LIGATION      TOTAL KNEE ARTHROPLASTY REVISION Left 10/02/2023    Procedure: TOTAL KNEE ARTHROPLASTY REVISION WITH CORI ROBOT;  Surgeon: Luis Ansari II, MD;  Location: [facility];  Service: Robotics - Ortho;  Laterality: Left;    WISDOM TOOTH EXTRACTION        General Information       Row Name 25 [id number]          Physical Therapy Time and Intention    Document Type  evaluation  -EJ     Mode of Treatment physical therapy  -EJ       Row Name 04/28/25 1703          General Information    Patient Profile Reviewed yes  -EJ     Prior Level of Function independent:;all household mobility;community mobility;ADL's  -EJ     Existing Precautions/Restrictions no known precautions/restrictions  -EJ     Barriers to Rehab none identified  -EJ       Row Name 04/28/25 1703          Living Environment    Current Living Arrangements home  -EJ     People in Home child(leanne), adult  -EJ       Row Name 04/28/25 1703          Home Main Entrance    Number of Stairs, Main Entrance one  -EJ       Row Name 04/28/25 1703          Cognition    Orientation Status (Cognition) oriented x 4  -EJ       Row Name 04/28/25 1703          Safety Issues/Impairments Affecting Functional Mobility    Impairments Affecting Function (Mobility) strength;range of motion (ROM);pain  -EJ               User Key  (r) = Recorded By, (t) = Taken By, (c) = Cosigned By      Initials Name Provider Type    Keiko Alcantara, PT Physical Therapist                   Mobility       Row Name 04/28/25 1704          Bed Mobility    Bed Mobility supine-sit  -EJ     Supine-Sit [place] (Bed Mobility) standby assist  -EJ     Assistive Device (Bed Mobility) head of bed elevated  -EJ       Row Name 04/28/25 1704          Sit-Stand Transfer    Sit-Stand [place] (Transfers) standby assist;contact guard  -EJ     Assistive Device (Sit-Stand Transfers) walker, front-wheeled  -EJ       Row Name 04/28/25 1704          Gait/Stairs (Locomotion)    [place] Level (Gait) verbal cues;contact guard  -EJ     Assistive Device (Gait) walker, front-wheeled  -EJ     Distance in Feet (Gait) 80  -EJ     Deviations/Abnormal Patterns (Gait) [place] decreased;stride length decreased  -EJ               User Key  (r) = Recorded By, (t) = Taken By, (c) = Cosigned By      Initials Name Provider Type    Keiko Alcantara, PT Physical Therapist                    Obj/Interventions       [facility] Name 04/28/25 1704          Range of Motion Comprehensive    General Range of Motion no range of motion deficits identified  -EJ     Comment, General Range of Motion x R knee  -EJ       [facility] Name 04/28/25 1704          Strength Comprehensive (MMT)    Comment, General Manual Muscle Testing (MMT) Assessment post op weakness  -EJ       [facility] Name 04/28/25 1704          Motor Skills    Therapeutic Exercise --  R TKR protocol x 5 reps  -EJ               User Key  (r) = Recorded By, (t) = Taken By, (c) = Cosigned By      Initials Name Provider Type    EJ Keiko Burnett C, PT Physical Therapist                   Goals/Plan       [facility] Name 04/28/25 1707          Bed Mobility Goal 1 (PT)    Activity/Assistive Device (Bed Mobility Goal 1, PT) bed mobility activities, all  -EJ     [place] Level/Cues Needed (Bed Mobility Goal 1, PT) supervision required  -EJ     Time Frame (Bed Mobility Goal 1, PT) 1 week  -EJ       Row Name 04/28/25 1707          Transfer Goal 1 (PT)    Activity/Assistive Device (Transfer Goal 1, PT) transfers, all;walker, rolling  -EJ     [place] Level/Cues Needed (Transfer Goal 1, PT) supervision required  -EJ     Time Frame (Transfer Goal 1, PT) 1 week  -EJ       Row Name 04/28/25 1707          Gait Training Goal 1 (PT)    Activity/Assistive Device (Gait Training Goal 1, PT) gait (walking locomotion);walker, rolling  -EJ     [place] Level (Gait Training Goal 1, PT) standby assist  -EJ     Distance (Gait Training Goal 1, PT) 150  -EJ     Time Frame (Gait Training Goal 1, PT) 1 week  -EJ       Row Name 04/28/25 1707          ROM Goal 1 (PT)    ROM Goal 1 (PT) R knee 5-90  -EJ     Time Frame (ROM Goal 1, PT) 1 week  -EJ       Row Name 04/28/25 1707          Stairs Goal 1 (PT)    Activity/Assistive Device (Stairs Goal 1, PT) stairs, all skills  -EJ     [place] Level/Cues Needed (Stairs Goal 1, PT) contact guard required  -EJ     Number of Stairs (Stairs  Goal 1, PT) 1  -EJ     Time Frame (Stairs Goal 1, PT) 1 week  -EJ       Row Name 04/28/25 1707          Therapy Assessment/Plan (PT)    Planned Therapy Interventions (PT) balance training;bed mobility training;gait training;home exercise program;stretching;strengthening;ROM (range of motion);stair training;patient/family education;transfer training  -EJ               User Key  (r) = Recorded By, (t) = Taken By, (c) = Cosigned By      Initials Name Provider Type    EJ Keiko Burnett, PT Physical Therapist                   Clinical Impression       Row Name 04/28/25 1705          Pain    Pretreatment Pain Rating 8/10  -EJ     Posttreatment Pain Rating 8/10  -EJ     Pain Location knee  -EJ     Pain Side/Orientation right  -EJ     Pain Management Interventions exercise or physical activity utilized  -[facility] Name 04/28/25 1705          Plan of Care Review    Plan of Care Reviewed With patient  -EJ     Outcome Evaluation Pt seen for PT eval this afternoon. She is POD 0 R TKA revision and presents w expected post op pain and weakness. Pt doing well. She is able to transition to EOB w SBA. She stood w SBA/CGA using Rwx and was able to ambulate approx 80 ft. No unsteadiness noted. Pt demos step through gait pattern. Pt agreeable to sit up in recliner at end of session. She tolerated all knee exercises well. Pt plans home upon DC w assist of family and HHPT. She will continue to benefit from skilled PT to maximize safety, strength, and independence w mobility.  -       Row Name 04/28/25 1705          Therapy Assessment/Plan (PT)    Rehab Potential (PT) good  -EJ     Criteria for Skilled Interventions Met (PT) yes  -EJ     Therapy Frequency (PT) daily  -EJ       Row Name 04/28/25 1705          Positioning and Restraints    Pre-Treatment Position in bed  -EJ     Post Treatment Position chair  -EJ     In Chair notified nsg;reclined;call light within reach;encouraged to call for assist;exit alarm on;with  family/caregiver  -EJ               User Key  (r) = Recorded By, (t) = Taken By, (c) = Cosigned By      Initials Name Provider Type    Keiko Alcantara, PT Physical Therapist                   Outcome Measures       Row Name 04/28/25 1708 04/28/25 1393       How much help from another person do you currently need...    Turning from your back to your side while in flat bed without using bedrails? 4  -EJ 4  -KS    Moving from lying on back to sitting on the side of a flat bed without bedrails? 4  -EJ 3  -KS    Moving to and from a bed to a chair (including a wheelchair)? 3  -EJ 3  -KS    Standing up from a chair using your arms (e.g., wheelchair, bedside chair)? 3  -EJ 3  -KS    Climbing 3-5 steps with a railing? 3  -EJ 2  -KS    To walk in hospital room? 3  -EJ 3  -KS    AM-PAC 6 Clicks Score (PT) 20  -EJ 18  -KS              User Key  (r) = Recorded By, (t) = Taken By, (c) = Cosigned By      Initials Name Provider Type    Keiko Alcantara, PT Physical Therapist    Saray Dunlap RN Registered Nurse                                   PT Recommendation and Plan  Planned Therapy Interventions (PT): balance training, bed mobility training, gait training, home exercise program, stretching, strengthening, ROM (range of motion), stair training, patient/family education, transfer training  Outcome Evaluation: Pt seen for PT eval this afternoon. She is POD 0 R TKA revision and presents w expected post op pain and weakness. Pt doing well. She is able to transition to EOB w SBA. She stood w SBA/CGA using Rwx and was able to ambulate approx 80 ft. No unsteadiness noted. Pt demos step through gait pattern. Pt agreeable to sit up in recliner at end of session. She tolerated all knee exercises well. Pt plans home upon DC w assist of family and HHPT. She will continue to benefit from skilled PT to maximize safety, strength, and independence w mobility.     Time Calculation:         PT Charges       Row Name  04/28/25 1709             Time Calculation    Start Time 1625  -EJ      Stop Time 1640  -EJ      Time Calculation (min) 15 min  -EJ      PT Received On 04/28/25  -EJ      PT - Next Appointment 04/29/25  -EJ      PT Goal Re-Cert Due Date 05/05/25  -EJ         Time Calculation- PT    Total Timed Code Minutes- PT 10 minute(s)  -EJ                User Key  (r) = Recorded By, (t) = Taken By, (c) = Cosigned By      Initials Name Provider Type     Keiko Burnett, PT Physical Therapist                  Therapy Charges for Today       Code Description Service Date Service Provider Modifiers Qty    [id number]  PT EVAL LOW COMPLEXITY 3 4/28/2025 Keiko Burnett, PT GP 1    [id number]  PT THERAPEUTIC ACT EA 15 MIN 4/28/2025 Keiko Burnett, PT GP 1            PT G-Codes  AM-PAC 6 Clicks Score (PT): 20  PT Discharge Summary  Anticipated Discharge Disposition (PT): home with assist, home with home health    Keiko Burnett, PT  4/28/2025

## 2025-04-28 NOTE — ANESTHESIA PREPROCEDURE EVALUATION
Anesthesia Evaluation     Patient summary reviewed and Nursing notes reviewed   NPO Solid Status: > 8 hours  NPO Liquid Status: > 2 hours           Airway   Mallampati: II  TM distance: >3 FB  Neck ROM: full  Dental          Pulmonary    (+) a smoker Current, COPD, asthma,recent URI resolved, sleep apnea (does not use CPAP)  Cardiovascular     ECG reviewed    (+) hypertension, CAD, PVD, hyperlipidemia  (-) past MI, dysrhythmias, angina, CHF, cardiac stents, CABG      Neuro/Psych  (+) numbness, psychiatric history Bipolar  GI/Hepatic/Renal/Endo    (+) GERD well controlled, diabetes mellitus type 2    Musculoskeletal     Abdominal    Substance History   (+) alcohol use     OB/GYN          Other   arthritis,                   Anesthesia Plan    ASA 3     general     (Previous grade 1 with mac 3)  intravenous induction     Anesthetic plan, risks, benefits, and alternatives have been provided, discussed and informed consent has been obtained with: patient.      CODE STATUS:

## 2025-04-28 NOTE — OP NOTE
Total Knee Robotic Revision Operative Note  Dr. BEBETO Ansari II  [phone]    PATIENT NAME: Yenny Crawford  MRN: [id number]  : 1965 AGE: 59 y.o. GENDER: female  DATE OF OPERATION: 2025  PREOPERATIVE DIAGNOSIS: Loose Implants: During workup this patient was found to have loose total knee implants.   POSTOPERATIVE DIAGNOSIS: Same  OPERATION PERFORMED: Right Total Knee Arthroplasty Revision  SURGEON: Luis Ansari MD  Circulator: Flash Boyd RN  Scrub Person: Alondra Huddleston  Vendor Representative: Keaton West  Assistant: Huyen Loaiza PA  ANESTHESIA: General  ASSISTANT: KEKE Jensen. This case would not have been possible without another set of skilled surgical hands for retraction, use of instrumentation, and general assistance.  This assistance was vital to the success of the case.   ESTIMATED BLOOD LOSS: 100cc  SPONGE AND NEEDLE COUNT: Correct  INDICATIONS: Loose Implants: This patient was noted to have a painful and loose total knee implant that failed conservative treatment. The patient was indicated for an elective revision total knee. A discussion of operative versus nonoperative treatment was had with the patient and they failed conservative management. They elected to undergo total knee arthroplasty revision. The risks of surgery were discussed and included the risk of anesthesia, infection, damage to neurovascular structures, implant loosening/failure, fracture, hardware prominence, continued pain, early failure, the need for further procedures, medical complications, and others. No guarantees were made. The patient wished to proceed with surgery and a surgical consent was signed. The patient's pain is becoming disabling, despite extensive conservative care including NSAIDs, therapy, and injections.    COMPONENTS:   Journey 38 mm patellar component  Size 6 right constrained nonporous femoral component with multiple wedges and a 12 x 120 stem  Size 3 right revision  nonporous tibial baseplate, Legion with a 10 mm wedge and a 10 x 120 stem  13 mm PS high flex articular insert    PERTINENT FINDINGS: Grossly loose tibial component    DETAILS OF PROCEDURE:  The patient was met in the preoperative area. The site was marked. The consent and H&P were reviewed. The patient was then wheeled back to the operative suite and transferred to the operative table. The patient underwent anesthesia. A tourniquet was placed on the upper thigh.  A bump was placed under the operative hip. The Couch baseplate was secured to the table. Surgical alcohol was used to thoroughly clean the entire operative extremity.     The leg was then prepped in the normal sterile fashion, which included ChloraPrep, multiple layers of sterile drapes, and surgical space suits for the entire operative team. The Couch boot was applied to the foot after adequate padding. An Ioban dressing was applied to the knee after the surgical incision was marked.  New outer gloves were used by all sterile surgical team members after final draping. After a surgical timeout in which administration of preoperative antibiotics as well as 1g of tranexamic acid (if not contraindicated) and the surgical site were confirmed, the tourniquet was put up.     2 tibial and 2 femoral pins were placed for the robot apparatus and the robotic arrays were attached.    In flexion, a midline knee incision was utilized through the old scar from the prior surgery.  Dissection was carried down to the capsule.  Medial and lateral flaps were created to allow adequate exposure.  Next a medial parapatellar arthrotomy was made.       The patella was exposed.  There was a lot of overgrowth around the patella and I elected to revise it in order to clean it up.  A saw was utilized and the patellar component was removed.  All excess cement was also extracted.  I then drilled for the patellar component and removed excess lateral facet.  I then turned my  attention to the robot.  The knee was mapped and planned in standard fashion.  Afterwards, the polyethylene was removed.  I loosen the femoral component using narrow osteotomes and a mallet.  It came out quite easily without any undue bony damage.  An extra my attention to the tibial component.  It was a little challenging to get the tibial component out due to the posterior subsidence.  However, it popped up quite easily and after releasing some of the posterior tissues I was able to remove it.  I then turned my attention back to the robot.  Utilizing the robotic technology, I mapped and planned for augments.  The distal femur and posterior femur were burred as necessary and then I turned my attention to the tibia.  After making initial solid resection using robotic guidance, I used a bur to clean up the tibial cut.  I did end up resecting 2 more millimeters than what was planned to get down to good healthy bone.  I then used the robotic bur to bur for the tibial and femoral stems.  The knee was then trialed.  Real implants were opened and cement was mixed.  Canal restrictors were utilized in both the tibia and femur.  The first batch of cement was utilized to cement the tibial component and the canal was pressurized.  The second batch of cement was utilized to cement the femoral component which was also pressurized.  A trial polyethylene was then inserted the knee was brought into extension.  The patella was cemented.  The tourniquet was taken down and adequate hemostasis was achieved.  The knee was injected with analgesic cocktail.  After all cement was hardened, the knee was trialed 1 final time and the real polyethylene was opened and inserted.    The knee was then closed in layers and a sterile dressing was applied    The patient was awoken from anesthesia, moved to the [facility] and taken to the recovery room in stable condition. Sponge and needle count were correct. There were no complications. Patient  "tolerated the procedure well.    R \"Marino\" Elan GONZALEZ MD  Orthopaedic Surgery  [facility]  [phone]                "

## 2025-04-29 PROBLEM — Z96.659 S/P REVISION OF TOTAL KNEE: Status: ACTIVE | Noted: 2025-04-29

## 2025-04-29 LAB
ANION GAP SERPL CALCULATED.3IONS-SCNC: 10.9 MMOL/L (ref 5–15)
BUN SERPL-MCNC: 16 MG/DL (ref 6–20)
BUN/CREAT SERPL: 18.8 (ref 7–25)
CALCIUM SPEC-SCNC: 8.8 MG/DL (ref 8.6–10.5)
CHLORIDE SERPL-SCNC: 106 MMOL/L (ref 98–107)
CO2 SERPL-SCNC: 24.1 MMOL/L (ref 22–29)
CREAT SERPL-MCNC: 0.85 MG/DL (ref 0.57–1)
DEPRECATED RDW RBC AUTO: 44 FL (ref 37–54)
EGFRCR SERPLBLD CKD-EPI 2021: 79 ML/MIN/1.73
ERYTHROCYTE [DISTWIDTH] IN BLOOD BY AUTOMATED COUNT: 13.1 % (ref 12.3–15.4)
GLUCOSE BLDC GLUCOMTR-MCNC: 128 MG/DL (ref 70–130)
GLUCOSE BLDC GLUCOMTR-MCNC: 138 MG/DL (ref 70–130)
GLUCOSE BLDC GLUCOMTR-MCNC: 146 MG/DL (ref 70–130)
GLUCOSE BLDC GLUCOMTR-MCNC: 192 MG/DL (ref 70–130)
GLUCOSE SERPL-MCNC: 175 MG/DL (ref 65–99)
HCT VFR BLD AUTO: 34.8 % (ref 34–46.6)
HGB BLD-MCNC: 11.4 G/DL (ref 12–15.9)
MCH RBC QN AUTO: 30.5 PG (ref 26.6–33)
MCHC RBC AUTO-ENTMCNC: 32.8 G/DL (ref 31.5–35.7)
MCV RBC AUTO: 93 FL (ref 79–97)
PLATELET # BLD AUTO: 258 10*3/MM3 (ref 140–450)
PMV BLD AUTO: 9.6 FL (ref 6–12)
POTASSIUM SERPL-SCNC: 4.4 MMOL/L (ref 3.5–5.2)
RBC # BLD AUTO: 3.74 10*6/MM3 (ref 3.77–5.28)
SODIUM SERPL-SCNC: 141 MMOL/L (ref 136–145)
WBC NRBC COR # BLD AUTO: 14.22 10*3/MM3 (ref 3.4–10.8)

## 2025-04-29 PROCEDURE — 82948 REAGENT STRIP/BLOOD GLUCOSE: CPT

## 2025-04-29 PROCEDURE — 97530 THERAPEUTIC ACTIVITIES: CPT

## 2025-04-29 PROCEDURE — 80048 BASIC METABOLIC PNL TOTAL CA: CPT | Performed by: ORTHOPAEDIC SURGERY

## 2025-04-29 PROCEDURE — 63710000001 INSULIN LISPRO (HUMAN) PER 5 UNITS: Performed by: HOSPITALIST

## 2025-04-29 PROCEDURE — 25010000002 CEFAZOLIN PER 500 MG: Performed by: ORTHOPAEDIC SURGERY

## 2025-04-29 PROCEDURE — 85027 COMPLETE CBC AUTOMATED: CPT | Performed by: ORTHOPAEDIC SURGERY

## 2025-04-29 PROCEDURE — 63710000001 DIPHENHYDRAMINE PER 50 MG: Performed by: ORTHOPAEDIC SURGERY

## 2025-04-29 RX ORDER — TRANEXAMIC ACID 10 MG/ML
1000 INJECTION, SOLUTION INTRAVENOUS ONCE
Status: COMPLETED | OUTPATIENT
Start: 2025-04-29 | End: 2025-04-29

## 2025-04-29 RX ADMIN — FAMOTIDINE 40 MG: 20 TABLET, FILM COATED ORAL at 08:18

## 2025-04-29 RX ADMIN — ASPIRIN 81 MG: 81 TABLET, COATED ORAL at 20:12

## 2025-04-29 RX ADMIN — ASPIRIN 81 MG: 81 TABLET, COATED ORAL at 08:18

## 2025-04-29 RX ADMIN — DIPHENHYDRAMINE HYDROCHLORIDE 50 MG: 25 CAPSULE ORAL at 20:12

## 2025-04-29 RX ADMIN — OXYCODONE HYDROCHLORIDE 10 MG: 5 TABLET ORAL at 10:41

## 2025-04-29 RX ADMIN — MELOXICAM 15 MG: 15 TABLET ORAL at 08:19

## 2025-04-29 RX ADMIN — OXYCODONE HYDROCHLORIDE 10 MG: 5 TABLET ORAL at 20:12

## 2025-04-29 RX ADMIN — ACETAMINOPHEN 1000 MG: 500 TABLET, FILM COATED ORAL at 20:12

## 2025-04-29 RX ADMIN — METOPROLOL TARTRATE 25 MG: 25 TABLET, FILM COATED ORAL at 08:19

## 2025-04-29 RX ADMIN — INSULIN LISPRO 2 UNITS: 100 INJECTION, SOLUTION INTRAVENOUS; SUBCUTANEOUS at 16:58

## 2025-04-29 RX ADMIN — CEFAZOLIN 2000 MG: 2 INJECTION, POWDER, FOR SOLUTION INTRAMUSCULAR; INTRAVENOUS at 03:49

## 2025-04-29 RX ADMIN — GABAPENTIN 600 MG: 300 CAPSULE ORAL at 20:12

## 2025-04-29 RX ADMIN — ACETAMINOPHEN 1000 MG: 500 TABLET, FILM COATED ORAL at 15:13

## 2025-04-29 RX ADMIN — TRANEXAMIC ACID 1000 MG: 10 INJECTION, SOLUTION INTRAVENOUS at 08:20

## 2025-04-29 RX ADMIN — OXYCODONE HYDROCHLORIDE 10 MG: 5 TABLET ORAL at 15:48

## 2025-04-29 RX ADMIN — ACETAMINOPHEN 1000 MG: 500 TABLET, FILM COATED ORAL at 08:18

## 2025-04-29 RX ADMIN — OXYCODONE HYDROCHLORIDE 10 MG: 5 TABLET ORAL at 06:43

## 2025-04-29 NOTE — THERAPY TREATMENT NOTE
Patient Name: Yenny Crawford  : 1965    MRN: [id number]                              Today's Date: 2025       Admit Date: 2025    Visit Dx: No diagnosis found.  Patient Active Problem List   Diagnosis    Status post knee replacement    COPD (chronic obstructive pulmonary disease)    CAD (coronary artery disease)    Tobacco abuse    HTN (hypertension)    Type 2 diabetes mellitus with hyperglycemia, without long-term current use of insulin    Peripheral neuropathy    Chronic back pain    Cellulitis of toe of right foot    Bipolar disorder, current episode depressed, mild    Osteomyelitis of toe    Knee joint replacement status    Obesity (BMI 30-39.9)     Past Medical History:   Diagnosis Date    Arthritis     Asthma     COPD (chronic obstructive pulmonary disease)     Degenerative disc disease, lumbar     CHRONIC    Diabetes mellitus     GERD (gastroesophageal reflux disease)     Hyperlipidemia     Hypertension     Neuropathy     CONOR FEET    Osteoarthritis     LEFT KNEE    Positive colorectal cancer screening using Cologuard test     PVD (peripheral vascular disease)     Seasonal allergies     Sleep apnea     NO CURRENT DEVICE     Past Surgical History:   Procedure Laterality Date    AMPUTATION DIGIT Right 2024    Procedure: SECOND AMPUTATION DIGIT, RIGHT FOOT;  Surgeon: Turner Portillo DPM;  Location: [facility];  Service: Podiatry;  Laterality: Right;    APPENDECTOMY      ARTHROPLASTY Right     KNEE    ECTOPIC PREGNANCY      JOINT REPLACEMENT Bilateral     KNEE ARTHROPLASTY    LAPAROSCOPIC TUBAL LIGATION      TOTAL KNEE ARTHROPLASTY REVISION Left 10/02/2023    Procedure: TOTAL KNEE ARTHROPLASTY REVISION WITH CORI ROBOT;  Surgeon: Luis Ansari II, MD;  Location: [facility];  Service: Robotics - Ortho;  Laterality: Left;    WISDOM TOOTH EXTRACTION        General Information       Row Name 25 1044          Physical Therapy Time and Intention    Document Type therapy  note (daily note)  -EJ     Mode of Treatment physical therapy  -EJ       Row Name 04/29/25 1044          General Information    Existing Precautions/Restrictions no known precautions/restrictions  -EJ               User Key  (r) = Recorded By, (t) = Taken By, (c) = Cosigned By      Initials Name Provider Type    Keiko Alcantara, PT Physical Therapist                   Mobility       Row Name 04/29/25 1045          Bed Mobility    Bed Mobility sit-supine  -EJ     Supine-Sit [place] (Bed Mobility) standby assist  -EJ     Sit-Supine [place] (Bed Mobility) standby assist  -EJ     Assistive Device (Bed Mobility) head of bed elevated  -EJ       Row Name 04/29/25 1045          Sit-Stand Transfer    Sit-Stand [place] (Transfers) standby assist  -EJ     Assistive Device (Sit-Stand Transfers) walker, front-wheeled  -EJ       Row Name 04/29/25 1045          Gait/Stairs (Locomotion)    [place] Level (Gait) standby assist  -EJ     Assistive Device (Gait) walker, front-wheeled  -EJ     Distance in Feet (Gait) 260  -EJ     Deviations/Abnormal Patterns (Gait) stride length decreased;antalgic  -EJ     Comment, (Gait/Stairs) good pace, no unsteadiness  -EJ               User Key  (r) = Recorded By, (t) = Taken By, (c) = Cosigned By      Initials Name Provider Type    Keiko Alcantara, PT Physical Therapist                   Obj/Interventions    No documentation.                  Goals/Plan    No documentation.                  Clinical Impression       Row Name 04/29/25 1046          Pain    Pretreatment Pain Rating 10/10  -EJ     Posttreatment Pain Rating 10/10  -EJ     Pain Location knee  -EJ     Pain Side/Orientation right  -EJ     Pain Management Interventions exercise or physical activity utilized  -EJ       Row Name 04/29/25 1046          Plan of Care Review    Plan of Care Reviewed With patient  -EJ     Outcome Evaluation Pt agreeable to PT this AM. She is doing well and progressing with  activity. Pt able to perform all bed mobility w SBA. She stood w SBA using Rwx and then ambulated approx 260 ft w SBA. Pt demos good pace w no unsteadiness. she does report 10/10 pain, but is moving quite well. Pt returned to bed at end of session. She reports performing all knee exercises independently. Pt plans home upon DC. Will continue to follow and progress as tolerated.  -EJ       Row Name 04/29/25 1046          Positioning and Restraints    Pre-Treatment Position in bed  -EJ     Post Treatment Position bed  -EJ     In Bed notified nsg;supine;call light within reach;encouraged to call for assist;with nsg  -EJ               User Key  (r) = Recorded By, (t) = Taken By, (c) = Cosigned By      Initials Name Provider Type    Keiko Alcantara, PT Physical Therapist                   Outcome Measures       Row Name 04/29/25 1049 04/29/25 0818       How much help from another person do you currently need...    Turning from your back to your side while in flat bed without using bedrails? 4  -EJ 4  -LP    Moving from lying on back to sitting on the side of a flat bed without bedrails? 4  -EJ 3  -LP    Moving to and from a bed to a chair (including a wheelchair)? 4  -EJ 3  -LP    Standing up from a chair using your arms (e.g., wheelchair, bedside chair)? 4  -EJ 3  -LP    Climbing 3-5 steps with a railing? 3  -EJ 3  -LP    To walk in hospital room? 4  -EJ 3  -LP    AM-PAC 6 Clicks Score (PT) 23  -EJ 19  -LP              User Key  (r) = Recorded By, (t) = Taken By, (c) = Cosigned By      Initials Name Provider Type    Tiffany Zheng, RN Registered Nurse    Keiko Alcantara, PT Physical Therapist                                   PT Recommendation and Plan  Planned Therapy Interventions (PT): balance training, bed mobility training, gait training, home exercise program, stretching, strengthening, ROM (range of motion), stair training, patient/family education, transfer training  Outcome Evaluation: Pt agreeable  to PT this AM. She is doing well and progressing with activity. Pt able to perform all bed mobility w SBA. She stood w SBA using Rwx and then ambulated approx 260 ft w SBA. Pt demos good pace w no unsteadiness. she does report 10/10 pain, but is moving quite well. Pt returned to bed at end of session. She reports performing all knee exercises independently. Pt plans home upon DC. Will continue to follow and progress as tolerated.     Time Calculation:         PT Charges       Row Name 04/29/25 1049             Time Calculation    Start Time 1031  -EJ      Stop Time 1045  -EJ      Time Calculation (min) 14 min  -EJ      PT Received On 04/29/25  -EJ      PT - Next Appointment 04/30/25  -EJ                User Key  (r) = Recorded By, (t) = Taken By, (c) = Cosigned By      Initials Name Provider Type    Keiko Alcantara, PT Physical Therapist                  Therapy Charges for Today       Code Description Service Date Service Provider Modifiers Qty    18379799149  PT EVAL LOW COMPLEXITY 3 4/28/2025 Keiko Burnett, PT GP 1    [id number]  PT THERAPEUTIC ACT EA 15 MIN 4/28/2025 Keiko Burnett, PT GP 1    06077029486 HC PT THERAPEUTIC ACT EA 15 MIN 4/29/2025 Keiko Burnett, PT GP 1            PT G-Codes  AM-PAC 6 Clicks Score (PT): 23  PT Discharge Summary  Anticipated Discharge Disposition (PT): home with assist, home with home health    Keiko Burnett PT  4/29/2025

## 2025-04-29 NOTE — PLAN OF CARE
Goal Outcome Evaluation:  Plan of Care Reviewed With: patient           Outcome Evaluation: Pt agreeable to PT this AM. She is doing well and progressing with activity. Pt able to perform all bed mobility w SBA. She stood w SBA using Rwx and then ambulated approx 260 ft w SBA. Pt demos good pace w no unsteadiness. she does report 10/10 pain, but is moving quite well. Pt returned to bed at end of session. She reports performing all knee exercises independently. Pt plans home upon DC. Will continue to follow and progress as tolerated.    Anticipated Discharge Disposition (PT): home with assist, home with home health

## 2025-04-29 NOTE — PLAN OF CARE
Goal Outcome Evaluation:  Plan of Care Reviewed With: patient   Patient is POD #1 for R TKA, A&OX 3, pain controlled, up with assist to bathroom.voiding without any difficulty.beckie dressing in place with no more bleeding overnight,

## 2025-04-29 NOTE — PLAN OF CARE
Goal Outcome Evaluation:  Plan of Care Reviewed With: patient        Progress: improving  Outcome Evaluation: A&Ox4, worked c pt, ambulating in room and hallway, pain controlled c po meds, vss, nvi, dsg changed this shift- beckie flashing green, dc to home tomorrow or thursday.

## 2025-04-29 NOTE — PROGRESS NOTES
Patient is postop day 1 total knee revision.  Had some early bleeding into the beckie.  That will be changed this morning.  Planning to stay until Thursday and discharge home at that time.

## 2025-04-29 NOTE — PROGRESS NOTES
Name: Yenny Crawford ADMIT: 2025   : 1965  PCP: Kassy Antonio APRN    MRN: [id number] LOS: 0 days   AGE/SEX: 59 y.o. female  [facility]     Subjective   Subjective   No new issues overnight.     Objective   Objective   Vital Signs  Temp:  [97.5 °F (36.4 °C)-98.2 °F (36.8 °C)] 97.5 °F (36.4 °C)  Heart Rate:  [58-80] 64  Resp:  [14-20] 16  BP: (108-188)/() 142/92  SpO2:  [94 %-100 %] 99 %  on  Flow (L/min) (Oxygen Therapy):  [2] 2;   Device (Oxygen Therapy): room air  Body mass index is 34.34 kg/m².  Physical Exam  Vitals and nursing note reviewed.   Constitutional:       General: She is not in acute distress.  Cardiovascular:      Rate and Rhythm: Normal rate and regular rhythm.   Pulmonary:      Effort: Pulmonary effort is normal.      Breath sounds: Normal breath sounds.   Abdominal:      General: Bowel sounds are normal.      Palpations: Abdomen is soft.      Tenderness: There is no abdominal tenderness.   Musculoskeletal:         General: No swelling.   Skin:     General: Skin is warm and dry.   Neurological:      Mental Status: She is alert. Mental status is at baseline.         Results Review:       I reviewed the patient's new clinical results.  Results from last 7 days   Lab Units 25  0407 25  1619   WBC 10*3/mm3 14.22* 7.31   HEMOGLOBIN g/dL 11.4* 12.9   PLATELETS 10*3/mm3 258 232     Results from last 7 days   Lab Units 25  0407 25  1619   SODIUM mmol/L 141 144   POTASSIUM mmol/L 4.4 4.5   CHLORIDE mmol/L 106 107   CO2 mmol/L 24.1 28.0   BUN mg/dL 16 20   CREATININE mg/dL 0.85 0.66   GLUCOSE mg/dL 175* 131*   EGFR mL/min/1.73 79.0 101.2     Results from last 7 days   Lab Units 25  1619   ALBUMIN g/dL 3.9   BILIRUBIN mg/dL <0.2   ALK PHOS U/L 95   AST (SGOT) U/L 14   ALT (SGPT) U/L 11     Results from last 7 days   Lab Units 25  0407 25  1619   CALCIUM mg/dL 8.8 9.3   ALBUMIN g/dL  --  3.9       Glucose   Date/Time Value Ref Range  Status   04/29/2025 0638 138 (H) 70 - 130 mg/dL Final   04/28/2025 2031 194 (H) 70 - 130 mg/dL Final   04/28/2025 1558 220 (H) 70 - 130 mg/dL Final   04/28/2025 1135 177 (H) 70 - 130 mg/dL Final   04/28/2025 0817 114 70 - 130 mg/dL Final       I have personally reviewed all medications:  Scheduled Medications  acetaminophen, 1,000 mg, Oral, Q6H  aspirin, 81 mg, Oral, Q12H  famotidine, 40 mg, Oral, Daily  gabapentin, 600 mg, Oral, Nightly  insulin lispro, 2-9 Units, Subcutaneous, 4x Daily AC & at Bedtime  meloxicam, 15 mg, Oral, Daily  metFORMIN, 850 mg, Oral, BID With Meals  metoprolol tartrate, 25 mg, Oral, BID    Infusions   Diet  Diet: Diabetic; Consistent Carbohydrate; Fluid Consistency: Thin (IDDSI 0)    I have personally reviewed:  [x]  Laboratory   [x]  Microbiology   [x]  Radiology   []  EKG/Telemetry  []  Cardiology/Vascular   []  Pathology    []  Records       Assessment/Plan     Active Hospital Problems    Diagnosis  POA    **Knee joint replacement status [Z96.659]  Not Applicable    Obesity (BMI 30-39.9) [E66.9]  Yes    HTN (hypertension) [I10]  Yes    CAD (coronary artery disease) [I25.10]  Yes    COPD (chronic obstructive pulmonary disease) [J44.9]  Yes    Type 2 diabetes mellitus with hyperglycemia, without long-term current use of insulin [E11.65]  Yes    Tobacco abuse [Z72.0]  Yes      Resolved Hospital Problems   No resolved problems to display.       59 y.o. female who is s/p RIGHT TOTAL KNEE REVISION.    AFVSS  /92  Normal O2 sats  WBC 14.2  Hemoglobin 11.4  Renal function stable  Blood sugars acceptable      Seems medically stable postoperatively.  Labs reassuring.  Continue to monitor BP and blood sugar.  Physical therapy to work with patient today.       Clint Pereira MD  [facility]  04/29/25  09:14 EDT

## 2025-04-30 ENCOUNTER — APPOINTMENT (OUTPATIENT)
Dept: CARDIOLOGY | Facility: HOSPITAL | Age: 60
End: 2025-04-30
Payer: COMMERCIAL

## 2025-04-30 LAB
BH CV LOWER VASCULAR LEFT COMMON FEMORAL AUGMENT: NORMAL
BH CV LOWER VASCULAR LEFT COMMON FEMORAL COMPETENT: NORMAL
BH CV LOWER VASCULAR LEFT COMMON FEMORAL COMPRESS: NORMAL
BH CV LOWER VASCULAR LEFT COMMON FEMORAL PHASIC: NORMAL
BH CV LOWER VASCULAR LEFT COMMON FEMORAL SPONT: NORMAL
BH CV LOWER VASCULAR RIGHT COMMON FEMORAL AUGMENT: NORMAL
BH CV LOWER VASCULAR RIGHT COMMON FEMORAL COMPETENT: NORMAL
BH CV LOWER VASCULAR RIGHT COMMON FEMORAL COMPRESS: NORMAL
BH CV LOWER VASCULAR RIGHT COMMON FEMORAL PHASIC: NORMAL
BH CV LOWER VASCULAR RIGHT COMMON FEMORAL SPONT: NORMAL
BH CV LOWER VASCULAR RIGHT DISTAL FEMORAL COMPRESS: NORMAL
BH CV LOWER VASCULAR RIGHT GASTRONEMIUS COMPRESS: NORMAL
BH CV LOWER VASCULAR RIGHT GREATER SAPH AK COMPRESS: NORMAL
BH CV LOWER VASCULAR RIGHT GREATER SAPH BK COMPRESS: NORMAL
BH CV LOWER VASCULAR RIGHT LESSER SAPH COMPRESS: NORMAL
BH CV LOWER VASCULAR RIGHT MID FEMORAL AUGMENT: NORMAL
BH CV LOWER VASCULAR RIGHT MID FEMORAL COMPETENT: NORMAL
BH CV LOWER VASCULAR RIGHT MID FEMORAL COMPRESS: NORMAL
BH CV LOWER VASCULAR RIGHT MID FEMORAL PHASIC: NORMAL
BH CV LOWER VASCULAR RIGHT MID FEMORAL SPONT: NORMAL
BH CV LOWER VASCULAR RIGHT PERONEAL COMPRESS: NORMAL
BH CV LOWER VASCULAR RIGHT POPLITEAL AUGMENT: NORMAL
BH CV LOWER VASCULAR RIGHT POPLITEAL COMPETENT: NORMAL
BH CV LOWER VASCULAR RIGHT POPLITEAL COMPRESS: NORMAL
BH CV LOWER VASCULAR RIGHT POPLITEAL PHASIC: NORMAL
BH CV LOWER VASCULAR RIGHT POPLITEAL SPONT: NORMAL
BH CV LOWER VASCULAR RIGHT POSTERIOR TIBIAL COMPRESS: NORMAL
BH CV LOWER VASCULAR RIGHT PROFUNDA FEMORAL COMPRESS: NORMAL
BH CV LOWER VASCULAR RIGHT PROXIMAL FEMORAL COMPRESS: NORMAL
BH CV LOWER VASCULAR RIGHT SAPHENOFEMORAL JUNCTION COMPRESS: NORMAL
GLUCOSE BLDC GLUCOMTR-MCNC: 115 MG/DL (ref 70–130)
GLUCOSE BLDC GLUCOMTR-MCNC: 123 MG/DL (ref 70–130)
GLUCOSE BLDC GLUCOMTR-MCNC: 151 MG/DL (ref 70–130)
GLUCOSE BLDC GLUCOMTR-MCNC: 154 MG/DL (ref 70–130)

## 2025-04-30 PROCEDURE — 93971 EXTREMITY STUDY: CPT | Performed by: SURGERY

## 2025-04-30 PROCEDURE — 93971 EXTREMITY STUDY: CPT

## 2025-04-30 PROCEDURE — 63710000001 DIPHENHYDRAMINE PER 50 MG: Performed by: ORTHOPAEDIC SURGERY

## 2025-04-30 PROCEDURE — 25010000002 HYDROMORPHONE 1 MG/ML SOLUTION: Performed by: ORTHOPAEDIC SURGERY

## 2025-04-30 PROCEDURE — 82948 REAGENT STRIP/BLOOD GLUCOSE: CPT

## 2025-04-30 PROCEDURE — 97116 GAIT TRAINING THERAPY: CPT

## 2025-04-30 PROCEDURE — 63710000001 INSULIN LISPRO (HUMAN) PER 5 UNITS: Performed by: HOSPITALIST

## 2025-04-30 PROCEDURE — 97530 THERAPEUTIC ACTIVITIES: CPT

## 2025-04-30 RX ORDER — OXYCODONE HYDROCHLORIDE 5 MG/1
10 TABLET ORAL EVERY 4 HOURS PRN
Refills: 0 | Status: DISCONTINUED | OUTPATIENT
Start: 2025-04-30 | End: 2025-05-01 | Stop reason: HOSPADM

## 2025-04-30 RX ADMIN — OXYCODONE HYDROCHLORIDE 10 MG: 5 TABLET ORAL at 02:02

## 2025-04-30 RX ADMIN — OXYCODONE 20 MG: 15 TABLET ORAL at 12:00

## 2025-04-30 RX ADMIN — ASPIRIN 81 MG: 81 TABLET, COATED ORAL at 07:55

## 2025-04-30 RX ADMIN — METOPROLOL TARTRATE 25 MG: 25 TABLET, FILM COATED ORAL at 07:54

## 2025-04-30 RX ADMIN — DIPHENHYDRAMINE HYDROCHLORIDE 50 MG: 25 CAPSULE ORAL at 20:53

## 2025-04-30 RX ADMIN — HYDROMORPHONE HYDROCHLORIDE 1 MG: 1 INJECTION, SOLUTION INTRAMUSCULAR; INTRAVENOUS; SUBCUTANEOUS at 13:19

## 2025-04-30 RX ADMIN — HYDROMORPHONE HYDROCHLORIDE 1 MG: 1 INJECTION, SOLUTION INTRAMUSCULAR; INTRAVENOUS; SUBCUTANEOUS at 22:19

## 2025-04-30 RX ADMIN — INSULIN LISPRO 2 UNITS: 100 INJECTION, SOLUTION INTRAVENOUS; SUBCUTANEOUS at 11:40

## 2025-04-30 RX ADMIN — OXYCODONE 20 MG: 15 TABLET ORAL at 20:53

## 2025-04-30 RX ADMIN — HYDROMORPHONE HYDROCHLORIDE 1 MG: 1 INJECTION, SOLUTION INTRAMUSCULAR; INTRAVENOUS; SUBCUTANEOUS at 18:02

## 2025-04-30 RX ADMIN — HYDROMORPHONE HYDROCHLORIDE 1 MG: 1 INJECTION, SOLUTION INTRAMUSCULAR; INTRAVENOUS; SUBCUTANEOUS at 04:13

## 2025-04-30 RX ADMIN — ASPIRIN 81 MG: 81 TABLET, COATED ORAL at 20:52

## 2025-04-30 RX ADMIN — ACETAMINOPHEN 1000 MG: 500 TABLET, FILM COATED ORAL at 02:02

## 2025-04-30 RX ADMIN — HYDROMORPHONE HYDROCHLORIDE 1 MG: 1 INJECTION, SOLUTION INTRAMUSCULAR; INTRAVENOUS; SUBCUTANEOUS at 08:49

## 2025-04-30 RX ADMIN — ACETAMINOPHEN 1000 MG: 500 TABLET, FILM COATED ORAL at 14:44

## 2025-04-30 RX ADMIN — METOPROLOL TARTRATE 25 MG: 25 TABLET, FILM COATED ORAL at 20:52

## 2025-04-30 RX ADMIN — ACETAMINOPHEN 1000 MG: 500 TABLET, FILM COATED ORAL at 20:52

## 2025-04-30 RX ADMIN — MELOXICAM 15 MG: 15 TABLET ORAL at 07:54

## 2025-04-30 RX ADMIN — OXYCODONE HYDROCHLORIDE 10 MG: 5 TABLET ORAL at 07:54

## 2025-04-30 RX ADMIN — FAMOTIDINE 40 MG: 20 TABLET, FILM COATED ORAL at 07:55

## 2025-04-30 RX ADMIN — INSULIN LISPRO 2 UNITS: 100 INJECTION, SOLUTION INTRAVENOUS; SUBCUTANEOUS at 20:52

## 2025-04-30 RX ADMIN — DOCUSATE SODIUM 100 MG: 100 CAPSULE, LIQUID FILLED ORAL at 07:55

## 2025-04-30 RX ADMIN — GABAPENTIN 600 MG: 300 CAPSULE ORAL at 20:52

## 2025-04-30 RX ADMIN — OXYCODONE 20 MG: 15 TABLET ORAL at 15:58

## 2025-04-30 NOTE — PROGRESS NOTES
Patient has pain that is much worse.  We will get a Doppler US to r/o DVT.  She has pain and swelling in thigh and calf.  Bleeding has improved.  ROXIE intact.  Possible D/C tomorrow.  Percocet 10 already ordered.  Will Continue to monitor BP

## 2025-04-30 NOTE — PROGRESS NOTES
Name: Yenny Crawford ADMIT: 2025   : 1965  PCP: Kassy Antonio APRN    MRN: [id number] LOS: 1 days   AGE/SEX: 59 y.o. female  ROOM: [facility]     Subjective   Subjective   Seems to be having much more pain this morning.  Concerned about swelling and increased pain in her right leg.     Objective   Objective   Vital Signs  Temp:  [97.7 °F (36.5 °C)-98.6 °F (37 °C)] 98.1 °F (36.7 °C)  Heart Rate:  [58-65] 65  Resp:  [16] 16  BP: (140-184)/(62-88) 151/77  SpO2:  [96 %-100 %] 99 %  on   ;   Device (Oxygen Therapy): room air  Body mass index is 34.34 kg/m².  Physical Exam  Vitals and nursing note reviewed.   Constitutional:       General: She is not in acute distress.  Cardiovascular:      Rate and Rhythm: Normal rate and regular rhythm.   Pulmonary:      Effort: Pulmonary effort is normal.      Breath sounds: Normal breath sounds.   Abdominal:      General: Bowel sounds are normal.      Palpations: Abdomen is soft.      Tenderness: There is no abdominal tenderness.   Musculoskeletal:         General: Swelling present.   Skin:     General: Skin is warm and dry.      Findings: Bruising present.   Neurological:      Mental Status: She is alert. Mental status is at baseline.         Results Review:       I reviewed the patient's new clinical results.  Results from last 7 days   Lab Units 25  0407   WBC 10*3/mm3 14.22*   HEMOGLOBIN g/dL 11.4*   PLATELETS 10*3/mm3 258     Results from last 7 days   Lab Units 25  0407   SODIUM mmol/L 141   POTASSIUM mmol/L 4.4   CHLORIDE mmol/L 106   CO2 mmol/L 24.1   BUN mg/dL 16   CREATININE mg/dL 0.85   GLUCOSE mg/dL 175*   EGFR mL/min/1.73 79.0           Results from last 7 days   Lab Units 25  0407   CALCIUM mg/dL 8.8       Glucose   Date/Time Value Ref Range Status   2025 0627 123 70 - 130 mg/dL Final   2025 128 70 - 130 mg/dL Final   2025 1538 192 (H) 70 - 130 mg/dL Final   2025 1103 146 (H) 70 - 130 mg/dL Final    04/29/2025 0638 138 (H) 70 - 130 mg/dL Final   04/28/2025 2031 194 (H) 70 - 130 mg/dL Final   04/28/2025 1558 220 (H) 70 - 130 mg/dL Final       I have personally reviewed all medications:  Scheduled Medications  acetaminophen, 1,000 mg, Oral, Q6H  aspirin, 81 mg, Oral, Q12H  famotidine, 40 mg, Oral, Daily  gabapentin, 600 mg, Oral, Nightly  insulin lispro, 2-9 Units, Subcutaneous, 4x Daily AC & at Bedtime  meloxicam, 15 mg, Oral, Daily  metFORMIN, 850 mg, Oral, BID With Meals  metoprolol tartrate, 25 mg, Oral, BID    Infusions   Diet  Diet: Diabetic; Consistent Carbohydrate; Fluid Consistency: Thin (IDDSI 0)    I have personally reviewed:  [x]  Laboratory   [x]  Microbiology   [x]  Radiology   []  EKG/Telemetry  []  Cardiology/Vascular   []  Pathology    []  Records       Assessment/Plan     Active Hospital Problems    Diagnosis  POA    **Knee joint replacement status [Z96.659]  Not Applicable    S/P revision of total knee [Z96.659]  Not Applicable    Obesity (BMI 30-39.9) [E66.9]  Yes    HTN (hypertension) [I10]  Yes    CAD (coronary artery disease) [I25.10]  Yes    COPD (chronic obstructive pulmonary disease) [J44.9]  Yes    Type 2 diabetes mellitus with hyperglycemia, without long-term current use of insulin [E11.65]  Yes    Tobacco abuse [Z72.0]  Yes      Resolved Hospital Problems   No resolved problems to display.       59 y.o. female who is s/p RIGHT TOTAL KNEE REVISION.    AFVSS though hypertensive 184/88 this morning  99% on room air  No labs  BS adequately controlled      Medically she seems stable.  Blood pressure elevated likely due to pain and stress.  I would not change her antihypertensive regimen.  She was prescribed Lopressor many years ago for her blood pressure she states.  Continues standard postoperative care.  Will reassess labs in AM.  Note plans from orthopedics to check venous Doppler to rule out DVT.       Clint Pereira MD  [facility]  04/30/25  08:45 EDT

## 2025-04-30 NOTE — PLAN OF CARE
Goal Outcome Evaluation:  Plan of Care Reviewed With: patient      Vss, nvi, dressing with scant amount of drainage, severe pain, PO meds increased with noted relief, ambulating with minimal assistance, voiding per brp, plans to d/c home tomorrow, educated on bp meds and monitoring.  Progress: improving

## 2025-04-30 NOTE — PLAN OF CARE
Goal Outcome Evaluation:  Plan of Care Reviewed With: patient        Progress: improving     Patient had uncontrolled pain overnight, IM dilaudid given with improvement, voiding without difficulty. Ambulates to bathroom with walker by self.

## 2025-04-30 NOTE — THERAPY TREATMENT NOTE
Patient Name: Yenny Crawford  : 1965    MRN: [id number]                              Today's Date: 2025       Admit Date: 2025    Visit Dx: No diagnosis found.  Patient Active Problem List   Diagnosis    Status post knee replacement    COPD (chronic obstructive pulmonary disease)    CAD (coronary artery disease)    Tobacco abuse    HTN (hypertension)    Type 2 diabetes mellitus with hyperglycemia, without long-term current use of insulin    Peripheral neuropathy    Chronic back pain    Cellulitis of toe of right foot    Bipolar disorder, current episode depressed, mild    Osteomyelitis of toe    Knee joint replacement status    Obesity (BMI 30-39.9)    S/P revision of total knee     Past Medical History:   Diagnosis Date    Arthritis     Asthma     COPD (chronic obstructive pulmonary disease)     Degenerative disc disease, lumbar     CHRONIC    Diabetes mellitus     GERD (gastroesophageal reflux disease)     Hyperlipidemia     Hypertension     Neuropathy     CONOR FEET    Osteoarthritis     LEFT KNEE    Positive colorectal cancer screening using Cologuard test     PVD (peripheral vascular disease)     Seasonal allergies     Sleep apnea     NO CURRENT DEVICE     Past Surgical History:   Procedure Laterality Date    AMPUTATION DIGIT Right 2024    Procedure: SECOND AMPUTATION DIGIT, RIGHT FOOT;  Surgeon: Turner Portillo DPM;  Location: [facility] OR;  Service: Podiatry;  Laterality: Right;    APPENDECTOMY      ARTHROPLASTY Right     KNEE    ECTOPIC PREGNANCY      JOINT REPLACEMENT Bilateral     KNEE ARTHROPLASTY    LAPAROSCOPIC TUBAL LIGATION      TOTAL KNEE ARTHROPLASTY REVISION Left 10/02/2023    Procedure: TOTAL KNEE ARTHROPLASTY REVISION WITH CORI ROBOT;  Surgeon: Luis Ansari II, MD;  Location: [facility];  Service: Robotics - Ortho;  Laterality: Left;    TOTAL KNEE ARTHROPLASTY REVISION Right 2025    Procedure: RIGHT TOTAL KNEE REVISION;  Surgeon: Luis Ansari  Tip GONZALEZ MD;  Location: [facility] OR [facility];  Service: Orthopedics;  Laterality: Right;    WISDOM TOOTH EXTRACTION        General Information       Row Name 04/30/25 1330          Physical Therapy Time and Intention    Document Type therapy note (daily note)  -EB     Mode of Treatment physical therapy  -EB       Row Name 04/30/25 1330          General Information    Patient Profile Reviewed yes  -EB     Existing Precautions/Restrictions no known precautions/restrictions  -EB       Row Name 04/30/25 1330          Cognition    Orientation Status (Cognition) oriented x 4  -EB       Row Name 04/30/25 1330          Safety Issues/Impairments Affecting Functional Mobility    Impairments Affecting Function (Mobility) endurance/activity tolerance;strength;pain  -EB               User Key  (r) = Recorded By, (t) = Taken By, (c) = Cosigned By      Initials Name Provider Type    EB Lilibeth Sen PTA Physical Therapist Assistant                   Mobility       Row Name 04/30/25 1331          Bed Mobility    Supine-Sit Dolores (Bed Mobility) standby assist;verbal cues  -EB     Sit-Supine Dolores (Bed Mobility) standby assist;verbal cues  -EB     Assistive Device (Bed Mobility) bed rails;head of bed elevated  -       Row Name 04/30/25 1331          Sit-Stand Transfer    Sit-Stand Dolores (Transfers) standby assist  -EB     Assistive Device (Sit-Stand Transfers) walker, front-wheeled  -EB       Row Name 04/30/25 1331          Gait/Stairs (Locomotion)    Dolores Level (Gait) standby assist  -EB     Assistive Device (Gait) walker, front-wheeled  -EB     Distance in Feet (Gait) 250  -EB     Deviations/Abnormal Patterns (Gait) antalgic;stride length decreased;gait speed decreased;holland decreased  -EB     Bilateral Gait Deviations forward flexed posture;heel strike decreased  -EB     Comment, (Gait/Stairs) no unsteadiness or LOB noted  -EB               User Key  (r) = Recorded By, (t) = Taken By, (c) = Cosigned  By      Initials Name Provider Type    Lilibeth Iqbal PTA Physical Therapist Assistant                   Obj/Interventions    No documentation.                  Goals/Plan    No documentation.                  Clinical Impression       Row Name 04/30/25 1345          Pain    Pain Location knee  -EB     Pain Side/Orientation right  -EB       Row Name 04/30/25 1345          Plan of Care Review    Plan of Care Reviewed With patient  -EB     Progress improving  -EB     Outcome Evaluation Pt seen for PT tx today. Pt c/o increased right knee pain with mobility. Pt completed SBA with bed mobility and SBA with STS transfers. Pt ambulated 250ft with rwx, SBA. No unsteadiness or LOB noted but slow gait pace. Will continue to follow pt for d/c needs. Pt plans home with HHPT.  -EB       Row Name 04/30/25 1346          Positioning and Restraints    Pre-Treatment Position in bed  -EB     Post Treatment Position bed  -EB     In Bed supine;call light within reach;encouraged to call for assist;exit alarm on  -EB               User Key  (r) = Recorded By, (t) = Taken By, (c) = Cosigned By      Initials Name Provider Type    Lilibeth Iqbal PTA Physical Therapist Assistant                   Outcome Measures       Row Name 04/30/25 134          How much help from another person do you currently need...    Turning from your back to your side while in flat bed without using bedrails? 4  -EB     Moving from lying on back to sitting on the side of a flat bed without bedrails? 4  -EB     Moving to and from a bed to a chair (including a wheelchair)? 3  -EB     Standing up from a chair using your arms (e.g., wheelchair, bedside chair)? 3  -EB     Climbing 3-5 steps with a railing? 3  -EB     To walk in hospital room? 3  -EB     AM-PAC 6 Clicks Score (PT) 20  -EB               User Key  (r) = Recorded By, (t) = Taken By, (c) = Cosigned By      Initials Name Provider Type    Lilibeth Iqbal PTA Physical Therapist Assistant                                  Physical Therapy Education       Title: PT OT SLP Therapies (Done)       Topic: Physical Therapy (Done)       Point: Mobility training (Done)       Learning Progress Summary            Patient Acceptance, E,D, VU,NR by  at 4/30/2025 1347                      Point: Body mechanics (Done)       Learning Progress Summary            Patient Acceptance, E,D, VU,NR by  at 4/30/2025 1347                                      User Key       Initials Effective Dates Name Provider Type [facility] 02/14/23 -  Lilibeth Sen PTA Physical Therapist Assistant PT                  PT Recommendation and Plan     Progress: improving  Outcome Evaluation: Pt seen for PT tx today. Pt c/o increased right knee pain with mobility. Pt completed SBA with bed mobility and SBA with STS transfers. Pt ambulated 250ft with rwx, SBA. No unsteadiness or LOB noted but slow gait pace. Will continue to follow pt for d/c needs. Pt plans home with HHPT.     Time Calculation:         PT Charges       Row Name 04/30/25 1327             Time Calculation    Start Time 0933  -EB      Stop Time 0956  -EB      Time Calculation (min) 23 min  -EB      PT Received On 04/30/25  -EB      PT - Next Appointment 05/01/25  -         Time Calculation- PT    Total Timed Code Minutes- PT 23 minute(s)  -EB                User Key  (r) = Recorded By, (t) = Taken By, (c) = Cosigned By      Initials Name Provider Type     Lilibeth Sen PTA Physical Therapist Assistant                  Therapy Charges for Today       Code Description Service Date Service Provider Modifiers Qty    39046444474 HC GAIT TRAINING EA 15 MIN 4/30/2025 Lilibeth Sen PTA GP 1    85604788625 HC PT THERAPEUTIC ACT EA 15 MIN 4/30/2025 Lilibeth Sen PTA GP 1            PT G-Codes  AM-PAC 6 Clicks Score (PT): 20  PT Discharge Summary  Anticipated Discharge Disposition (PT): home with assist, home with home health    Lilibeth Sen PTA  4/30/2025

## 2025-04-30 NOTE — PLAN OF CARE
Goal Outcome Evaluation:  Plan of Care Reviewed With: patient        Progress: improving  Outcome Evaluation: Pt seen for PT tx today. Pt c/o increased right knee pain with mobility. Pt completed SBA with bed mobility and SBA with STS transfers. Pt ambulated 250ft with rwx, SBA. No unsteadiness or LOB noted but slow gait pace. Will continue to follow pt for d/c needs. Pt plans home with HHPT.    Anticipated Discharge Disposition (PT): home with assist, home with home health

## 2025-05-01 ENCOUNTER — READMISSION MANAGEMENT (OUTPATIENT)
Dept: CALL CENTER | Facility: HOSPITAL | Age: 60
End: 2025-05-01
Payer: COMMERCIAL

## 2025-05-01 VITALS
HEIGHT: 68 IN | DIASTOLIC BLOOD PRESSURE: 79 MMHG | WEIGHT: 222.66 LBS | SYSTOLIC BLOOD PRESSURE: 137 MMHG | TEMPERATURE: 98.8 F | OXYGEN SATURATION: 95 % | HEART RATE: 60 BPM | RESPIRATION RATE: 18 BRPM | BODY MASS INDEX: 33.75 KG/M2

## 2025-05-01 LAB
ANION GAP SERPL CALCULATED.3IONS-SCNC: 9 MMOL/L (ref 5–15)
BUN SERPL-MCNC: 10 MG/DL (ref 6–20)
BUN/CREAT SERPL: 15.9 (ref 7–25)
CALCIUM SPEC-SCNC: 8.7 MG/DL (ref 8.6–10.5)
CHLORIDE SERPL-SCNC: 102 MMOL/L (ref 98–107)
CO2 SERPL-SCNC: 28 MMOL/L (ref 22–29)
CREAT SERPL-MCNC: 0.63 MG/DL (ref 0.57–1)
DEPRECATED RDW RBC AUTO: 41.5 FL (ref 37–54)
EGFRCR SERPLBLD CKD-EPI 2021: 102.3 ML/MIN/1.73
ERYTHROCYTE [DISTWIDTH] IN BLOOD BY AUTOMATED COUNT: 12.7 % (ref 12.3–15.4)
GLUCOSE BLDC GLUCOMTR-MCNC: 195 MG/DL (ref 70–130)
GLUCOSE SERPL-MCNC: 200 MG/DL (ref 65–99)
HCT VFR BLD AUTO: 31.3 % (ref 34–46.6)
HGB BLD-MCNC: 10.5 G/DL (ref 12–15.9)
MCH RBC QN AUTO: 30.2 PG (ref 26.6–33)
MCHC RBC AUTO-ENTMCNC: 33.5 G/DL (ref 31.5–35.7)
MCV RBC AUTO: 89.9 FL (ref 79–97)
PLATELET # BLD AUTO: 222 10*3/MM3 (ref 140–450)
PMV BLD AUTO: 9.8 FL (ref 6–12)
POTASSIUM SERPL-SCNC: 4.2 MMOL/L (ref 3.5–5.2)
RBC # BLD AUTO: 3.48 10*6/MM3 (ref 3.77–5.28)
SODIUM SERPL-SCNC: 139 MMOL/L (ref 136–145)
WBC NRBC COR # BLD AUTO: 8.72 10*3/MM3 (ref 3.4–10.8)

## 2025-05-01 PROCEDURE — 25010000002 HYDROMORPHONE 1 MG/ML SOLUTION: Performed by: ORTHOPAEDIC SURGERY

## 2025-05-01 PROCEDURE — 85027 COMPLETE CBC AUTOMATED: CPT | Performed by: HOSPITALIST

## 2025-05-01 PROCEDURE — 97530 THERAPEUTIC ACTIVITIES: CPT

## 2025-05-01 PROCEDURE — 97116 GAIT TRAINING THERAPY: CPT

## 2025-05-01 PROCEDURE — 63710000001 INSULIN LISPRO (HUMAN) PER 5 UNITS: Performed by: HOSPITALIST

## 2025-05-01 PROCEDURE — 82948 REAGENT STRIP/BLOOD GLUCOSE: CPT

## 2025-05-01 PROCEDURE — 80048 BASIC METABOLIC PNL TOTAL CA: CPT | Performed by: HOSPITALIST

## 2025-05-01 RX ORDER — ONDANSETRON 4 MG/1
4 TABLET, FILM COATED ORAL EVERY 6 HOURS PRN
Qty: 30 TABLET | Refills: 0 | Status: SHIPPED | OUTPATIENT
Start: 2025-05-01

## 2025-05-01 RX ORDER — ASPIRIN 81 MG/1
81 TABLET ORAL EVERY 12 HOURS
Qty: 60 TABLET | Refills: 0 | Status: SHIPPED | OUTPATIENT
Start: 2025-05-01 | End: 2025-05-31

## 2025-05-01 RX ORDER — OXYCODONE AND ACETAMINOPHEN 10; 325 MG/1; MG/1
1 TABLET ORAL EVERY 4 HOURS PRN
Qty: 40 TABLET | Refills: 0 | Status: SHIPPED | OUTPATIENT
Start: 2025-05-01

## 2025-05-01 RX ADMIN — ACETAMINOPHEN 1000 MG: 500 TABLET, FILM COATED ORAL at 01:38

## 2025-05-01 RX ADMIN — OXYCODONE 20 MG: 15 TABLET ORAL at 10:43

## 2025-05-01 RX ADMIN — HYDROMORPHONE HYDROCHLORIDE 1 MG: 1 INJECTION, SOLUTION INTRAMUSCULAR; INTRAVENOUS; SUBCUTANEOUS at 12:28

## 2025-05-01 RX ADMIN — MELOXICAM 15 MG: 15 TABLET ORAL at 08:35

## 2025-05-01 RX ADMIN — ACETAMINOPHEN 1000 MG: 500 TABLET, FILM COATED ORAL at 08:34

## 2025-05-01 RX ADMIN — OXYCODONE 20 MG: 15 TABLET ORAL at 06:37

## 2025-05-01 RX ADMIN — METOPROLOL TARTRATE 25 MG: 25 TABLET, FILM COATED ORAL at 08:35

## 2025-05-01 RX ADMIN — FAMOTIDINE 40 MG: 20 TABLET, FILM COATED ORAL at 08:34

## 2025-05-01 RX ADMIN — OXYCODONE 20 MG: 15 TABLET ORAL at 01:38

## 2025-05-01 RX ADMIN — INSULIN LISPRO 2 UNITS: 100 INJECTION, SOLUTION INTRAVENOUS; SUBCUTANEOUS at 08:35

## 2025-05-01 RX ADMIN — HYDROMORPHONE HYDROCHLORIDE 1 MG: 1 INJECTION, SOLUTION INTRAMUSCULAR; INTRAVENOUS; SUBCUTANEOUS at 08:34

## 2025-05-01 RX ADMIN — METFORMIN HYDROCHLORIDE 850 MG: 850 TABLET, FILM COATED ORAL at 08:34

## 2025-05-01 NOTE — PLAN OF CARE
Problem: Adult Inpatient Plan of Care  Goal: Plan of Care Review  Outcome: Progressing  Goal: Patient-Specific Goal (Individualized)  Outcome: Progressing  Goal: Absence of Hospital-Acquired Illness or Injury  Outcome: Progressing  Intervention: Identify and Manage Fall Risk  Recent Flowsheet Documentation  Taken 4/30/2025 2000 by Aman Garcia RN  Safety Promotion/Fall Prevention: safety round/check completed  Intervention: Prevent and Manage VTE (Venous Thromboembolism) Risk  Recent Flowsheet Documentation  Taken 4/30/2025 2000 by Aman Garcia RN  VTE Prevention/Management:   bilateral   SCDs (sequential compression devices) on  Intervention: Prevent Infection  Recent Flowsheet Documentation  Taken 4/30/2025 2000 by Aman Garcia RN  Infection Prevention: environmental surveillance performed  Goal: Optimal Comfort and Wellbeing  Outcome: Progressing  Intervention: Monitor Pain and Promote Comfort  Recent Flowsheet Documentation  Taken 4/30/2025 2000 by Aman Garcia RN  Pain Management Interventions: pain medication given  Intervention: Provide Person-Centered Care  Recent Flowsheet Documentation  Taken 4/30/2025 2000 by Aman Garcia RN  Trust Relationship/Rapport:   care explained   choices provided   questions answered   questions encouraged   reassurance provided  Goal: Readiness for Transition of Care  Outcome: Progressing     Problem: Fall Injury Risk  Goal: Absence of Fall and Fall-Related Injury  Outcome: Progressing  Intervention: Promote Injury-Free Environment  Recent Flowsheet Documentation  Taken 4/30/2025 2000 by Aman Garcia RN  Safety Promotion/Fall Prevention: safety round/check completed     Problem: Knee Arthroplasty  Goal: Optimal Coping  Outcome: Progressing  Goal: Absence of Bleeding  Outcome: Progressing  Goal: Effective Bowel Elimination  Outcome: Progressing  Goal: Optimal Functional Ability  Outcome: Progressing  Goal: Absence of Infection Signs and  Symptoms  Outcome: Progressing  Goal: Intact Neurovascular Status  Outcome: Progressing  Goal: Anesthesia/Sedation Recovery  Outcome: Progressing  Intervention: Optimize Anesthesia Recovery  Recent Flowsheet Documentation  Taken 4/30/2025 2200 by Aman Garcia RN  Administration (IS): self-administered  Taken 4/30/2025 2000 by Aman Gracia RN  Safety Promotion/Fall Prevention: safety round/check completed  Administration (IS): self-administered  Goal: Optimal Pain Control and Function  Outcome: Progressing  Intervention: Prevent or Manage Pain  Recent Flowsheet Documentation  Taken 4/30/2025 2000 by Aman Garcia RN  Pain Management Interventions: pain medication given  Diversional Activities:   [employer]   television  Goal: Effective Urinary Elimination  Outcome: Progressing  Goal: Effective Oxygenation and Ventilation  Outcome: Progressing  Intervention: Optimize Oxygenation and Ventilation  Recent Flowsheet Documentation  Taken 4/30/2025 2000 by Aman Garcia RN  Cough And Deep Breathing: done independently per patient     Problem: Comorbidity Management  Goal: Blood Glucose Level Within Target Range  Outcome: Progressing  Goal: Blood Pressure in Desired Range  Outcome: Progressing   Goal Outcome Evaluation:

## 2025-05-01 NOTE — DISCHARGE INSTR - ACTIVITY
Total Knee  Discharge Instructions  Dr. BEBETO Llanos” Elan II  [phone]    INCISION CARE  Wash your hands prior to dressing changes  ROXIE Wound VAC: Postoperatively you had a ROXIE Wound Vac placed on the incision. This was placed under sterile conditions in the operating room. It remains in place for 7 days postoperatively. After 7 days, the entire dressing must be removed, including all of the sticky adhesive. The dressing and battery pack provide gentle suction to the incision and provide several benefits over a traditional dressing:  It maintains the sterile environment of the OR and reduces the risk of infection  The suction removes unwanted buildup of blood/hematoma under the skin to reduce swelling  The suction also promotes fresh blood supply to the skin and soft tissue to speed up healing  The postoperative scar is reduced in size  Showering is permitted immediately after surgery, but the battery pack must be protected or removed during the shower.   After 7 days the ROXIE Wound Vac is removed. If there is no drainage, no dressing is required. If there is some scant drainage a dry bandage can be applied and changed daily until seen in the office or until the drainage stops.   No creams or ointments to the incisions until 4 weeks post op.  Do not touch or pick at the incision  Check incision every day and notify surgeon immediately if any of the following signs or symptoms are seen:  Increase in redness  Increase in swelling around the incision and of the entire extremity  Increase in pain  NEW drainage or oozing from the incision  Pulling apart of the edges of the incision  Increase in overall body temperature (greater than 100.4 degrees)  Zip-Line: your incision was closed with a state of the art device.   Is a non-invasive and easy to use wound closure device that replaces sutures, staples and glue for surgical incisions  It minimizes scarring and eliminating “railroad” marks that come with staples or  sutures  It makes removal as atraumatic as peeling off a bandage  Can be removed at home or by a physical therapist or nurse at 14 days postoperatively  Sutures: For the robot pins you will  typically have 4 sutures.  2 of the sutures will be below the incision and 2 of the sutures will be above the incision.  These may either be taken out by home health at 10 to 14 days or if they are still in place when you come to your first postoperative visit, they will be taken out in the office    ACTIVITIES  Exercises:  Physical therapy will begin immediately while in the hospital. Patients going to a nursing home will get therapy as part of their care at the SNU/SNF facility. Patients going home may also have a therapist come to the house to help them mobilize until they can safely get to an outpatient therapy facility.  Elevate the affected leg most of the day during the first week post operatively. Caution must be taken to avoid pillow placement directly under the heel of the leg, as this can cause pressure ulcers even with a soft pillow. All pillows and blankets should be placed underneath of the thigh and calf so that the heel is free-floating.  Use cold packs for 20-30 minutes approximately 5 times per day.  You should perform the daily stretching and strengthening exercises as taught by the therapist as often as possible. This can be done many times a day.  Full weight bearing is allowed after surgery. It will be sore/painful to put weight on the leg, but this will help the bone to heal and prevent complications such as pneumonia, bed sores and blood clots. Mobilization is vital to the recovery process.  Activities of Daily Living:  No tub baths, hot tubs, or swimming pools for 4 weeks.  May shower and let water run over the incision immediately after surgery. The battery pack of the ROXIE Wound Vac must be protected or removed while in the shower. After the ROXIE is removed 7 days after surgery showering is permitted  as long as there is no drainage from the wound.     Restrictions  Weight: It is ok to allow full weight bearing after surgery. Weight on the leg actually quickens the recovery process. While it will be sore/painful to put weight on the leg, it is safe to do so. Hip replacement after hip fracture has a much slower recover process. It can take months to heal fully from a hip fracture and patients even make some slow benefits up to a year afterwards.   Driving: Many patients have questions about when it is safe to return to driving. The answer is that this is extremely variable. It depends on the extent of the surgery, as well as how quickly you heal. Certainly left leg surgeries make returning to driving easier while right leg surgeries require more extensive rehabilitation before driving can be safe. Until you can press down on the brake hard, and are off of all narcotics, driving is not permitted. Your surgeon cannot “clear” you to return to driving, only you can make the decision when you feel it is safe.    Medications  Anticoagulants: After upper extremity surgery most patients do not require an anticoagulant unless you have another injury that will be keeping you from mobilizing. Lower extremity surgery typically does require use of an anticoagulation medicine.   IF YOU HAD LOWER EXTREMITY SURGERY AND ARE NOT DISCHARGED HOME WITH ANY ANTICOAGULANT MEDICINE YOU SHOULD TAKE ASPIRIN 325mg DAILY FOR 30 DAYS POSTOPERATIVELY.  If you are discharged home with an anticoagulant such as Aspirin, Xarelto, Eliquis, Coumadin, or Lovenox, follow these simple instructions:   Notify surgeon immediately if any kim bleeding is noted in the urine, stool, emesis, or from the nose or the incision. Blood in the stool will often appear as black rather than red. Blood in urine may appear as pink. Blood in emesis may appear as brown/black like coffee grounds.  You will need to apply pressure for longer periods of time to any cuts or  abrasions to stop bleeding  Avoid alcohol while taking anticoagulants  Most anticoagulants are to be taken for 30 days postoperatively. After this time, you may stop using them unless instructed otherwise.   If you were already taking an anticoagulant (commonly Aspirin, Coumadin, or Plavix) you will likely be resuming your normal dose postoperatively and will be continuing that medication at the discretion of the prescribing physician.  Stool Softeners: You will be at greater risk of constipation after surgery due to being less mobile and the pain medications.  Take stool softeners as needed. Over the counter Colace 100 mg 1-2 capsules twice daily can be taken.  If stools become too loose or too frequent, please decreases the dosage or stop the stool softener.  If constipation occurs despite use of stool softeners, you are to continue the stool softeners and add a laxative (Milk of Magnesia 1 ounce daily as needed)  Drink plenty of fluids, and eat fruits and vegetables during your recovery time. Getting up and mobilizing will help the bowels to recover their regular function, as will weaning off of all narcotics when the pain becomes tolerable.  Pain Medications: Utilized after surgery are narcotics. This is some general information about these medications.  CLASSIFICATION: Pain medications are called Opioids and are narcotics  LEGALITIES: It is illegal to share narcotics with others  DRIVING: it is illegal to drive while under the influence of narcotics. Doing so is a DUI.  POTENTIAL SIDE EFFECTS: nausea, vomiting, itching, dizziness, drowsiness, dry mouth, constipation, and difficulty urinating.  POTENTIAL ADVERSE EFFECTS:  Opioid tolerance can develop with use of pain medications and this simply means that it requires more and more of the medication to control pain. However, this is seen more in patients that use opioids for longer periods of time.  Opioid dependence can develop with use of Opioids. People with  opioid dependence will experience withdrawal symptoms upon cessation of the medication.  Opioid addiction can develop with use of Opioids. The incidence of this is very unlikely in patients who take the medications as ordered and stop the medications as instructed.  Opioid overdose can be dangerous, but is unlikely when the medication is taken as ordered and stopped when ordered. It is important not to mix opioids with alcohol as this can lead to over sedation and respiratory difficulty.  DOSAGE:  After the initial surgical pain begins to resolve, you may begin to decrease the pain medication. By the end of a few weeks, you should be off of pain medications.  Refills will not be given by the office during evening hours, on weekends, or after 6 weeks post-op. You are responsible for weaning off of pain medication. You can increase the time between narcotic pills, taking one every 4 then 6 then 8 hours and so on.  To seek refills on pain medications during the initial 6-week post-operative period, you must call the office to request the refill. The office will then notify you when to  the prescription. DO NOT wait until you are out of the medication to request a refill. Prescriptions will not be filled over the weekend and depending on the schedule, it may take a couple days for the prescription to be available. Someone will have to pick the prescription in person at the office.    FOLLOW-UP VISITS  You will need to follow up in the office with your surgeon in 3 weeks, or as instructed elsewhere in your discharge paperwork. Please call this number [phone] to schedule this appointment. If you are going to an SNF/SNU facility, they will arrange for you to follow up in the office.  If you have any concerns or suspected complications prior to your follow up visit, please call the office. Do not wait until your appointment time if you suspect complications. These will need to be addressed in the office  promptly.

## 2025-05-01 NOTE — DISCHARGE SUMMARY
Orthopaedic Discharge Summary  Dr. TATE “Marino” Tow II  [phone]    NAME: Yenny CAMPOS Crawford PCP: Kassy Antonio APRN   :  MRN: [id number]  [id number] LOS:  ADMIT: 2 days  2025   AGE/SEX: 59 y.o. 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]    Surgery Performed: RIGHT TOTAL KNEE REVISION    Discharge Medications:         Discharge Medications        New Medications        Instructions Start Date   aspirin 81 MG EC tablet   81 mg, Oral, Every 12 Hours      ondansetron 4 MG tablet  Commonly known as: Zofran   4 mg, Oral, Every 6 Hours PRN      oxyCODONE-acetaminophen  MG per tablet  Commonly known as: PERCOCET  Replaces: oxyCODONE-acetaminophen 5-325 MG per tablet   1 tablet, Oral, Every 4 Hours PRN             Continue These Medications        Instructions Start Date   acyclovir 400 MG tablet  Commonly known as: ZOVIRAX   400 mg, Oral, Daily PRN      albuterol sulfate  (90 Base) MCG/ACT inhaler  Commonly known as: PROVENTIL HFA;VENTOLIN HFA;PROAIR HFA   2 puffs, Every 4 Hours PRN      CHLORHEXIDINE GLUCONATE CLOTH EX   Apply  topically. USE AS DIRECTED      Cholecalciferol 50 MCG (2000 UT) tablet   2,000 Units, Daily      Cyanocobalamin 1000 MCG/ML kit   1,000 mcg, Every 30 Days      fish oil 1200 MG capsule capsule   1,200 mg, Oral, Daily With Breakfast, HOLDING FOR DOS      gabapentin 600 MG tablet  Commonly known as: NEURONTIN   800 mg, Nightly PRN      guaifenesin-dextromethorphan 600-30 mg  MG tablet sustained-release 12 hour   2 tablets, Oral, 2 Times Daily PRN      Jardiance 10 MG tablet tablet  Generic drug: empagliflozin   1 tablet, Daily      loratadine 10 MG tablet  Commonly known as: CLARITIN   10 mg, Daily      metFORMIN 850 MG tablet  Commonly known as: GLUCOPHAGE   1 tablet, Oral, 2 Times Daily With Meals, NOT CURRENTLY TAKING      metoprolol tartrate 25 MG tablet  Commonly known as:  LOPRESSOR   25 mg, 2 Times Daily      naproxen 500 MG tablet  Commonly known as: NAPROSYN   500 mg, Oral, 2 Times Daily PRN      nitroglycerin 0.4 MG SL tablet  Commonly known as: NITROSTAT   0.4 mg, Every 5 Minutes PRN      ondansetron ODT 4 MG disintegrating tablet  Commonly known as: ZOFRAN-ODT   4 mg, Translingual, Every 8 Hours PRN      oseltamivir 75 MG capsule  Commonly known as: Tamiflu   75 mg, Oral, 2 Times Daily      simvastatin 10 MG tablet  Commonly known as: ZOCOR   10 mg, Nightly      TiZANidine 2 MG capsule  Commonly known as: ZANAFLEX   2 mg, 3 Times Daily             Stop These Medications      HYDROcodone-acetaminophen 7.5-325 MG per tablet  Commonly known as: NORCO     oxyCODONE-acetaminophen 5-325 MG per tablet  Commonly known as: PERCOCET  Replaced by: oxyCODONE-acetaminophen  MG per tablet              Vitals:     Vitals:    04/30/25 0805 04/30/25 1602 04/30/25 1930 05/01/25 0333   BP: 151/77 140/94 159/78 136/82   BP Location:   Left arm Left arm   Patient Position:   Lying Lying   Pulse: 65 65 63 57   Resp: 16 18 18 18   Temp: 98.1 °F (36.7 °C) 98.1 °F (36.7 °C) 97.7 °F (36.5 °C) 98.6 °F (37 °C)   TempSrc: Oral Axillary Oral Oral   SpO2: 99% 97% 97% 93%   Weight:       Height:           Labs:      Admission on 04/28/2025   Component Date Value Ref Range Status    ABO Type 04/28/2025 B   Final    RH type 04/28/2025 Positive   Final    Antibody Screen 04/28/2025 Negative   Final    T&S Expiration Date 04/28/2025 5/1/2025 11:59:59 PM   Final    Glucose 04/28/2025 114  70 - 130 mg/dL Final    Glucose 04/28/2025 177 (H)  70 - 130 mg/dL Final    Glucose 04/28/2025 220 (H)  70 - 130 mg/dL Final    Glucose 04/28/2025 194 (H)  70 - 130 mg/dL Final    Glucose 04/29/2025 175 (H)  65 - 99 mg/dL Final    BUN 04/29/2025 16  6 - 20 mg/dL Final    Creatinine 04/29/2025 0.85  0.57 - 1.00 mg/dL Final    Sodium 04/29/2025 141  136 - 145 mmol/L Final    Potassium 04/29/2025 4.4  3.5 - 5.2 mmol/L Final     Chloride 04/29/2025 106  98 - 107 mmol/L Final    CO2 04/29/2025 24.1  22.0 - 29.0 mmol/L Final    Calcium 04/29/2025 8.8  8.6 - 10.5 mg/dL Final    BUN/Creatinine Ratio 04/29/2025 18.8  7.0 - 25.0 Final    Anion Gap 04/29/2025 10.9  5.0 - 15.0 mmol/L Final    eGFR 04/29/2025 79.0  >60.0 mL/min/1.73 Final    WBC 04/29/2025 14.22 (H)  3.40 - 10.80 10*3/mm3 Final    RBC 04/29/2025 3.74 (L)  3.77 - 5.28 10*6/mm3 Final    Hemoglobin 04/29/2025 11.4 (L)  12.0 - 15.9 g/dL Final    Hematocrit 04/29/2025 34.8  34.0 - 46.6 % Final    MCV 04/29/2025 93.0  79.0 - 97.0 fL Final    MCH 04/29/2025 30.5  26.6 - 33.0 pg Final    MCHC 04/29/2025 32.8  31.5 - 35.7 g/dL Final    RDW 04/29/2025 13.1  12.3 - 15.4 % Final    RDW-SD 04/29/2025 44.0  37.0 - 54.0 fl Final    MPV 04/29/2025 9.6  6.0 - 12.0 fL Final    Platelets 04/29/2025 258  140 - 450 10*3/mm3 Final    Glucose 04/29/2025 138 (H)  70 - 130 mg/dL Final    Glucose 04/29/2025 146 (H)  70 - 130 mg/dL Final    Glucose 04/29/2025 192 (H)  70 - 130 mg/dL Final    Glucose 04/29/2025 128  70 - 130 mg/dL Final    Glucose 04/30/2025 123  70 - 130 mg/dL Final    Right Common Femoral Spont 04/30/2025 Y   Final    Right Common Femoral Competent 04/30/2025 Y   Final    Right Common Femoral Phasic 04/30/2025 Y   Final    Right Common Femoral Compress 04/30/2025 C   Final    Right Common Femoral Augment 04/30/2025 Y   Final    Right Saphenofemoral Junction Comp* 04/30/2025 C   Final    Right Profunda Femoral Compress 04/30/2025 C   Final    Right Proximal Femoral Compress 04/30/2025 C   Final    Right Mid Femoral Spont 04/30/2025 Y   Final    Right Mid Femoral Competent 04/30/2025 Y   Final    Right Mid Femoral Phasic 04/30/2025 Y   Final    Right Mid Femoral Compress 04/30/2025 C   Final    Right Mid Femoral Augment 04/30/2025 Y   Final    Right Distal Femoral Compress 04/30/2025 C   Final    Right Popliteal Spont 04/30/2025 Y   Final    Right Popliteal Competent 04/30/2025 Y   Final     Right Popliteal Phasic 04/30/2025 Y   Final    Right Popliteal Compress 04/30/2025 C   Final    Right Popliteal Augment 04/30/2025 Y   Final    Right Posterior Tibial Compress 04/30/2025 C   Final    Right Peroneal Compress 04/30/2025 C   Final    Right Gastronemius Compress 04/30/2025 C   Final    Right Greater Saph AK Compress 04/30/2025 C   Final    Right Greater Saph BK Compress 04/30/2025 C   Final    Right Lesser Saph Compress 04/30/2025 C   Final    Left Common Femoral Spont 04/30/2025 Y   Final    Left Common Femoral Competent 04/30/2025 Y   Final    Left Common Femoral Phasic 04/30/2025 Y   Final    Left Common Femoral Compress 04/30/2025 C   Final    Left Common Femoral Augment 04/30/2025 Y   Final    Glucose 04/30/2025 151 (H)  70 - 130 mg/dL Final    Glucose 04/30/2025 115  70 - 130 mg/dL Final    Glucose 04/30/2025 154 (H)  70 - 130 mg/dL Final    Glucose 05/01/2025 200 (H)  65 - 99 mg/dL Final    BUN 05/01/2025 10  6 - 20 mg/dL Final    Creatinine 05/01/2025 0.63  0.57 - 1.00 mg/dL Final    Sodium 05/01/2025 139  136 - 145 mmol/L Final    Potassium 05/01/2025 4.2  3.5 - 5.2 mmol/L Final    Chloride 05/01/2025 102  98 - 107 mmol/L Final    CO2 05/01/2025 28.0  22.0 - 29.0 mmol/L Final    Calcium 05/01/2025 8.7  8.6 - 10.5 mg/dL Final    BUN/Creatinine Ratio 05/01/2025 15.9  7.0 - 25.0 Final    Anion Gap 05/01/2025 9.0  5.0 - 15.0 mmol/L Final    eGFR 05/01/2025 102.3  >60.0 mL/min/1.73 Final    WBC 05/01/2025 8.72  3.40 - 10.80 10*3/mm3 Final    RBC 05/01/2025 3.48 (L)  3.77 - 5.28 10*6/mm3 Final    Hemoglobin 05/01/2025 10.5 (L)  12.0 - 15.9 g/dL Final    Hematocrit 05/01/2025 31.3 (L)  34.0 - 46.6 % Final    MCV 05/01/2025 89.9  79.0 - 97.0 fL Final    MCH 05/01/2025 30.2  26.6 - 33.0 pg Final    MCHC 05/01/2025 33.5  31.5 - 35.7 g/dL Final    RDW 05/01/2025 12.7  12.3 - 15.4 % Final    RDW-SD 05/01/2025 41.5  37.0 - 54.0 fl Final    MPV 05/01/2025 9.8  6.0 - 12.0 fL Final    Platelets 05/01/2025 222   140 - 450 10*3/mm3 Final    Glucose 05/01/2025 195 (H)  70 - 130 mg/dL Final        Duplex Venous Lower Extremity - Right CAR  Result Date: 4/30/2025  Narrative:   Normal right lower extremity venous duplex scan.     XR Knee 1 or 2 View Right  Result Date: 4/28/2025  Narrative: XR KNEE 1 OR 2 VW RIGHT-  POSTOP PORTABLE 2 VIEWS RIGHT KNEE  CLINICAL INFORMATION: Post arthroplasty  FINDINGS: Prosthesis is satisfactory in position. Surgical skin staples in position. Typical soft tissue gas seen. A complicating process is not identified.  This report was finalized on 4/28/2025 11:54 AM by Dr. Zan Bagley M.D on Workstation: [id number]      XR Chest PA & Lateral  Result Date: 4/23/2025  Narrative: XR CHEST PA AND LATERAL-  HISTORY: Female who is 59 years-old, preoperative evaluation  TECHNIQUE: Frontal and lateral views of the chest  COMPARISON: 3/18/2025  FINDINGS: The heart size is normal. Aorta is tortuous. Pulmonary vasculature is unremarkable. No focal pulmonary consolidation, pleural effusion, or pneumothorax. No acute osseous process.      Impression: No focal pulmonary consolidation. Tortuous aorta. Follow-up as clinically indicated.  This report was finalized on 4/23/2025 8:21 AM by Dr. Avery Olivo M.D on Workstation: [id number]      XR Knee 1 or 2 View Right  Result Date: 4/23/2025  Narrative: XR KNEE 1 OR 2 VW RIGHT-   HISTORY:   PREOP  TECHNIQUE:  Two views of the right knee.  FINDINGS:  Prior total knee arthroplasty, no evidence of fracture or dislocation of device loosening or failure.      Impression:  Prior knee arthroplasty, no acute findings.  This report was finalized on 4/23/2025 2:08 AM by Dr. Lawrence Ackerman M.D on Workstation: [id number]        Hospital Course:   59 y.o. 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 Post-operatively the patient transferred to the floor where the patient underwent mobilization therapy. Opioids were titrated to achieve appropriate pain management to allow for participation in mobilization exercises. Vital signs and laboratory values are now within safe parameters for discharge. The dressings and/or incision is intact without signs or symptoms of active infection. Operative extremity neurovascular status remains intact as compared to the preoperative exam. Appropriate education re: incision care, activity levels, medications, and follow up visits was completed and all questions were answered. The patient is now deemed stable for discharge.    HOME: The patient progressed well with physical therapy. There were cleared for discharge to home. The patietn was sent home in good condition}.       R \"Marino\" Elan GONZALEZ MD  Orthopaedic Surgery  [facility]  [phone]                                               "

## 2025-05-01 NOTE — THERAPY TREATMENT NOTE
Patient Name: Yenny Crawford  : 1965    MRN: [id number]                              Today's Date: 2025       Admit Date: 2025    Visit Dx: No diagnosis found.  Patient Active Problem List   Diagnosis    Status post knee replacement    COPD (chronic obstructive pulmonary disease)    CAD (coronary artery disease)    Tobacco abuse    HTN (hypertension)    Type 2 diabetes mellitus with hyperglycemia, without long-term current use of insulin    Peripheral neuropathy    Chronic back pain    Cellulitis of toe of right foot    Bipolar disorder, current episode depressed, mild    Osteomyelitis of toe    Knee joint replacement status    Obesity (BMI 30-39.9)    S/P revision of total knee     Past Medical History:   Diagnosis Date    Arthritis     Asthma     COPD (chronic obstructive pulmonary disease)     Degenerative disc disease, lumbar     CHRONIC    Diabetes mellitus     GERD (gastroesophageal reflux disease)     Hyperlipidemia     Hypertension     Neuropathy     CONOR FEET    Osteoarthritis     LEFT KNEE    Positive colorectal cancer screening using Cologuard test     PVD (peripheral vascular disease)     Seasonal allergies     Sleep apnea     NO CURRENT DEVICE     Past Surgical History:   Procedure Laterality Date    AMPUTATION DIGIT Right 2024    Procedure: SECOND AMPUTATION DIGIT, RIGHT FOOT;  Surgeon: Turner Portillo DPM;  Location: [facility] OR;  Service: Podiatry;  Laterality: Right;    APPENDECTOMY      ARTHROPLASTY Right     KNEE    ECTOPIC PREGNANCY      JOINT REPLACEMENT Bilateral     KNEE ARTHROPLASTY    LAPAROSCOPIC TUBAL LIGATION      TOTAL KNEE ARTHROPLASTY REVISION Left 10/02/2023    Procedure: TOTAL KNEE ARTHROPLASTY REVISION WITH CORI ROBOT;  Surgeon: Luis Ansari II, MD;  Location: [facility];  Service: Robotics - Ortho;  Laterality: Left;    TOTAL KNEE ARTHROPLASTY REVISION Right 2025    Procedure: RIGHT TOTAL KNEE REVISION;  Surgeon: Luis Ansari  Tip GONZALEZ MD;  Location[facility] OR [facility];  Service: Orthopedics;  Laterality: Right;    WISDOM TOOTH EXTRACTION        General Information       Row Name 05/01/25 1159          Physical Therapy Time and Intention    Document Type therapy note (daily note)  -EB     Mode of Treatment physical therapy  -EB       Row Name 05/01/25 1159          General Information    Patient Profile Reviewed yes  -EB     Existing Precautions/Restrictions no known precautions/restrictions  -EB       Row Name 05/01/25 1159          Cognition    Orientation Status (Cognition) oriented x 4  -EB       Row Name 05/01/25 1159          Safety Issues/Impairments Affecting Functional Mobility    Impairments Affecting Function (Mobility) strength;endurance/activity tolerance  -EB               User Key  (r) = Recorded By, (t) = Taken By, (c) = Cosigned By      Initials Name Provider Type    EB Lilibeth Sen PTA Physical Therapist Assistant                   Mobility       Row Name 05/01/25 1159          Bed Mobility    Supine-Sit Preston (Bed Mobility) standby assist;verbal cues  -EB     Assistive Device (Bed Mobility) bed rails;head of bed elevated  -EB       Row Name 05/01/25 1159          Sit-Stand Transfer    Sit-Stand Preston (Transfers) standby assist  -EB     Assistive Device (Sit-Stand Transfers) walker, front-wheeled  -EB       Row Name 05/01/25 1159          Gait/Stairs (Locomotion)    Preston Level (Gait) standby assist  -EB     Assistive Device (Gait) walker, front-wheeled  -EB     Distance in Feet (Gait) 400  -EB     Deviations/Abnormal Patterns (Gait) antalgic;stride length decreased;gait speed decreased;holland decreased  -EB     Preston Level (Stairs) stand by assist  -EB     Handrail Location (Stairs) both sides  -EB     Number of Steps (Stairs) 8  -EB     Ascending Technique (Stairs) step-to-step  -EB     Descending Technique (Stairs) step-to-step  -EB     Comment, (Gait/Stairs) no unsteadiness or LOB  noted, pt reports pain has improved.  -EB               User Key  (r) = Recorded By, (t) = Taken By, (c) = Cosigned By      Initials Name Provider Type    Lilibeth Iqbal PTA Physical Therapist Assistant                   Obj/Interventions    No documentation.                  Goals/Plan    No documentation.                  Clinical Impression       Row Name 05/01/25 1200          Plan of Care Review    Plan of Care Reviewed With patient  -EB     Progress improving  -EB     Outcome Evaluation Pt seen for PT tx today. Pt reports right knee is feeling better today than yesterday. Pt completed bed mobility and stands with SBA. Pt ambulated 400ft with rwx, SBA and navigated 8 steps with HRs,, SBA. No unsteadiness or LOB noted. Pt ok to d/c home with assist and HHPT.  -EB       Row Name 05/01/25 1200          Therapy Assessment/Plan (PT)    Therapy Frequency (PT) daily  -EB       Row Name 05/01/25 1200          Positioning and Restraints    Pre-Treatment Position in bed  -EB     Post Treatment Position bed  -EB     In Chair sitting;call light within reach;encouraged to call for assist;with family/caregiver  -               User Key  (r) = Recorded By, (t) = Taken By, (c) = Cosigned By      Initials Name Provider Type    Lilibeth Iqbal PTA Physical Therapist Assistant                   Outcome Measures       Row Name 05/01/25 1203 05/01/25 0834       How much help from another person do you currently need...    Turning from your back to your side while in flat bed without using bedrails? 4  -EB 4  -KM    Moving from lying on back to sitting on the side of a flat bed without bedrails? 4  -EB 4  -KM    Moving to and from a bed to a chair (including a wheelchair)? 3  -EB 3  -KM    Standing up from a chair using your arms (e.g., wheelchair, bedside chair)? 4  -EB 3  -KM    Climbing 3-5 steps with a railing? 3  -EB 3  -KM    To walk in hospital room? 3  -EB 3  -KM    AM-PAC 6 Clicks Score (PT) 21  -EB 20  -KM               User Key  (r) = Recorded By, (t) = Taken By, (c) = Cosigned By      Initials Name Provider Type    EB Lilibeth Sen PTA Physical Therapist Assistant     Rebeca Atwood RN Registered Nurse                                 Physical Therapy Education       Title: PT OT SLP Therapies (Resolved)       Topic: Physical Therapy (Resolved)       Point: Mobility training (Resolved)       Learning Progress Summary            Patient Acceptance, E,D, VU,NR by  at 4/30/2025 1347                      Point: Body mechanics (Resolved)       Learning Progress Summary            Patient Acceptance, E,D, VU,NR by  at 4/30/2025 1347                                      User Key       Initials Effective Dates Name Provider Type Discipline     02/14/23 -  Lilibeth Sen PTA Physical Therapist Assistant PT                  PT Recommendation and Plan     Progress: improving  Outcome Evaluation: Pt seen for PT tx today. Pt reports right knee is feeling better today than yesterday. Pt completed bed mobility and stands with SBA. Pt ambulated 400ft with rwx, SBA and navigated 8 steps with HRs,, SBA. No unsteadiness or LOB noted. Pt ok to d/c home with assist and HHPT.     Time Calculation:         PT Charges       Row Name 05/01/25 1159             Time Calculation    Start Time 0945  -EB      Stop Time 1012  -EB      Time Calculation (min) 27 min  -      PT Received On 05/01/25  -      PT - Next Appointment 05/02/25  -         Time Calculation- PT    Total Timed Code Minutes- PT 27 minute(s)  -                User Key  (r) = Recorded By, (t) = Taken By, (c) = Cosigned By      Initials Name Provider Type    EB Lilibeth Sen PTA Physical Therapist Assistant                  Therapy Charges for Today       Code Description Service Date Service Provider Modifiers Qty    26585983812 HC GAIT TRAINING EA 15 MIN 4/30/2025 Lilibeth Sen PTA GP 1    52718379017 HC PT THERAPEUTIC ACT EA 15 MIN 4/30/2025 Lilibeth Sen PTA GP 1     94335089769 HC GAIT TRAINING EA 15 MIN 5/1/2025 Lilibeth Sen, LYNDSEY GP 1    99659067705 HC PT THERAPEUTIC ACT EA 15 MIN 5/1/2025 Lilibeth Sen PTA GP 1            PT G-Codes  AM-PAC 6 Clicks Score (PT): 21  PT Discharge Summary  Anticipated Discharge Disposition (PT): home with assist, home with home health    Lilibeth Sen PTA  5/1/2025

## 2025-05-01 NOTE — CASE MANAGEMENT/SOCIAL WORK
Case Management Discharge Note      Final Note: Home with  [facility]  via private vehicle         Selected Continued Care - Discharged on 5/1/2025 Admission date: 4/28/2025 - Discharge disposition: Home or Self Care      Destination    No services have been selected for the patient.                Durable Medical Equipment    No services have been selected for the patient.                Dialysis/Infusion    No services have been selected for the patient.                Home Medical Care Coordination complete.      Service Provider Services Address Phone Fax Patient Preferred    [facility] [place] [facility] [address] [facility] [place] [address] [phone] --              Therapy    No services have been selected for the patient.                Community Resources    No services have been selected for the patient.                Community & DME    No services have been selected for the patient.                    Transportation Services  Private: Car    Final Discharge Disposition Code: 06 - home with home health care

## 2025-05-01 NOTE — PLAN OF CARE
Goal Outcome Evaluation:  Plan of Care Reviewed With: patient        Progress: improving  Outcome Evaluation: Pt seen for PT tx today. Pt reports right knee is feeling better today than yesterday. Pt completed bed mobility and stands with SBA. Pt ambulated 400ft with rwx, SBA and navigated 8 steps with HRs,, SBA. No unsteadiness or LOB noted. Pt ok to d/c home with assist and HHPT.    Anticipated Discharge Disposition (PT): home with assist, home with home health

## 2025-05-02 NOTE — PAYOR COMM NOTE
"Yenny Berg (59 y.o. Female)          DC SUMMARY FOR [id number]          Date of Birth   1965    Social Security Number       Address   [address] [facility] [address]    Home Phone   [phone]    MRN   [id number]       [facility]    Marital Status                               Admission Date   2025    Admission Type   Elective    Admitting Provider   Luis Ansari II, MD    Attending Provider       Department, Room/Bed   [address], P791/1       Discharge Date   2025    Discharge Disposition   Home or Self Care    Discharge Destination                                 Attending Provider: (none)   Allergies: Latex    Isolation: None   Infection: None   Code Status: Prior    Ht: 171.5 cm (67.52\")   Wt: 101 kg (222 lb 10.6 oz)    Admission Cmt: None   Principal Problem: Knee joint replacement status [Z96.659]                   Active Insurance as of 2025       Primary Coverage       Payor Plan Insurance Group Employer/Plan Group    [facility] BY GOSIA [id number]       Payor Plan Address Payor Plan Phone Number Payor Plan Fax Number Effective Dates    [address] [address]   2021 - None Entered    [facility] [address]         Subscriber Name Subscriber Birth Date Member ID       YENNY BERG 1965 [id number]                     Emergency Contacts        (Rel.) Home Phone Work Phone Mobile Phone    ADAMA CELAYA (Daughter) [phone] -- [phone]    HUSSEIN HARPER (Son) [phone] -- [phone]                 Operative/Procedure Notes (last 7 days)        Luis Ansari II, MD at 25 0957            Total Knee Robotic Revision Operative Note  Dr. TATE “Marino” Elan GONZALEZ  [phone]    PATIENT NAME: Yenny Berg  MRN: [id number]  : 1965 AGE: 59 y.o. GENDER: female  DATE OF OPERATION: 2025  PREOPERATIVE DIAGNOSIS: Loose Implants: During workup this " patient was found to have loose total knee implants.   POSTOPERATIVE DIAGNOSIS: Same  OPERATION PERFORMED: Right Total Knee Arthroplasty Revision  SURGEON: Luis Ansari MD  Circulator: Flash Boyd RN  Scrub Person: Alondra Huddleston  Vendor Representative: Keaton West  Assistant: Huyen Loaiza PA  ANESTHESIA: General  ASSISTANT: KEKE Jensen. This case would not have been possible without another set of skilled surgical hands for retraction, use of instrumentation, and general assistance.  This assistance was vital to the success of the case.   ESTIMATED BLOOD LOSS: 100cc  SPONGE AND NEEDLE COUNT: Correct  INDICATIONS: Loose Implants: This patient was noted to have a painful and loose total knee implant that failed conservative treatment. The patient was indicated for an elective revision total knee. A discussion of operative versus nonoperative treatment was had with the patient and they failed conservative management. They elected to undergo total knee arthroplasty revision. The risks of surgery were discussed and included the risk of anesthesia, infection, damage to neurovascular structures, implant loosening/failure, fracture, hardware prominence, continued pain, early failure, the need for further procedures, medical complications, and others. No guarantees were made. The patient wished to proceed with surgery and a surgical consent was signed. The patient's pain is becoming disabling, despite extensive conservative care including NSAIDs, therapy, and injections.    COMPONENTS:   Journey 38 mm patellar component  Size 6 right constrained nonporous femoral component with multiple wedges and a 12 x 120 stem  Size 3 right revision nonporous tibial baseplate, Legion with a 10 mm wedge and a 10 x 120 stem  13 mm PS high flex articular insert    PERTINENT FINDINGS: Grossly loose tibial component    DETAILS OF PROCEDURE:  The patient was met in the preoperative area. The site was marked. The  consent and H&P were reviewed. The patient was then wheeled back to the operative suite and transferred to the operative table. The patient underwent anesthesia. A tourniquet was placed on the upper thigh.  A bump was placed under the operative hip. The Couch baseplate was secured to the table. Surgical alcohol was used to thoroughly clean the entire operative extremity.     The leg was then prepped in the normal sterile fashion, which included ChloraPrep, multiple layers of sterile drapes, and surgical space suits for the entire operative team. The Couch boot was applied to the foot after adequate padding. An Ioban dressing was applied to the knee after the surgical incision was marked.  New outer gloves were used by all sterile surgical team members after final draping. After a surgical timeout in which administration of preoperative antibiotics as well as 1g of tranexamic acid (if not contraindicated) and the surgical site were confirmed, the tourniquet was put up.     2 tibial and 2 femoral pins were placed for the robot apparatus and the robotic arrays were attached.    In flexion, a midline knee incision was utilized through the old scar from the prior surgery.  Dissection was carried down to the capsule.  Medial and lateral flaps were created to allow adequate exposure.  Next a medial parapatellar arthrotomy was made.       The patella was exposed.  There was a lot of overgrowth around the patella and I elected to revise it in order to clean it up.  A saw was utilized and the patellar component was removed.  All excess cement was also extracted.  I then drilled for the patellar component and removed excess lateral facet.  I then turned my attention to the robot.  The knee was mapped and planned in standard fashion.  Afterwards, the polyethylene was removed.  I loosen the femoral component using narrow osteotomes and a mallet.  It came out quite easily without any undue bony damage.  An extra my  "attention to the tibial component.  It was a little challenging to get the tibial component out due to the posterior subsidence.  However, it popped up quite easily and after releasing some of the posterior tissues I was able to remove it.  I then turned my attention back to the robot.  Utilizing the robotic technology, I mapped and planned for augments.  The distal femur and posterior femur were burred as necessary and then I turned my attention to the tibia.  After making initial solid resection using robotic guidance, I used a bur to clean up the tibial cut.  I did end up resecting 2 more millimeters than what was planned to get down to good healthy bone.  I then used the robotic bur to bur for the tibial and femoral stems.  The knee was then trialed.  Real implants were opened and cement was mixed.  Canal restrictors were utilized in both the tibia and femur.  The first batch of cement was utilized to cement the tibial component and the canal was pressurized.  The second batch of cement was utilized to cement the femoral component which was also pressurized.  A trial polyethylene was then inserted the knee was brought into extension.  The patella was cemented.  The tourniquet was taken down and adequate hemostasis was achieved.  The knee was injected with analgesic cocktail.  After all cement was hardened, the knee was trialed 1 final time and the real polyethylene was opened and inserted.    The knee was then closed in layers and a sterile dressing was applied    The patient was awoken from anesthesia, moved [place] the [place] and taken to the recovery room in stable condition. Sponge and needle count were correct. There were no complications. Patient tolerated the procedure well.    R \"Marino\" Elan GONZALEZ MD  Orthopaedic Surgery  [facility]  [phone]                  Electronically signed by Luis Ansari II, MD at 04/28/25 1413          Discharge Summary        Luis Ansari " Tip GONZALEZ MD at 25 0657            Orthopaedic Discharge Summary  Dr. TATE “Marino” Elan GONZALEZ  [phone]    NAME: Yenny Crawford PCP: Kassy Antonio APRN   :  MRN: 1965  [id number] LOS:  ADMIT: 2 days  2025   AGE/SEX: 59 y.o. 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]    Surgery Performed: RIGHT TOTAL KNEE REVISION    Discharge Medications:         Discharge Medications        New Medications        Instructions Start Date   aspirin 81 MG EC tablet   81 mg, Oral, Every 12 Hours      ondansetron 4 MG tablet  Commonly known as: Zofran   4 mg, Oral, Every 6 Hours PRN      oxyCODONE-acetaminophen  MG per tablet  Commonly known as: PERCOCET  Replaces: oxyCODONE-acetaminophen 5-325 MG per tablet   1 tablet, Oral, Every 4 Hours PRN             Continue These Medications        Instructions Start Date   acyclovir 400 MG tablet  Commonly known as: ZOVIRAX   400 mg, Oral, Daily PRN      albuterol sulfate  (90 Base) MCG/ACT inhaler  Commonly known as: PROVENTIL HFA;VENTOLIN HFA;PROAIR HFA   2 puffs, Every 4 Hours PRN      CHLORHEXIDINE GLUCONATE CLOTH EX   Apply  topically. USE AS DIRECTED      Cholecalciferol 50 MCG (2000 UT) tablet   2,000 Units, Daily      Cyanocobalamin 1000 MCG/ML kit   1,000 mcg, Every 30 Days      fish oil 1200 MG capsule capsule   1,200 mg, Oral, Daily With Breakfast, HOLDING FOR DOS      gabapentin 600 MG tablet  Commonly known as: NEURONTIN   800 mg, Nightly PRN      guaifenesin-dextromethorphan 600-30 mg  MG tablet sustained-release 12 hour   2 tablets, Oral, 2 Times Daily PRN      Jardiance 10 MG tablet tablet  Generic drug: empagliflozin   1 tablet, Daily      loratadine 10 MG tablet  Commonly known as: CLARITIN   10 mg, Daily      metFORMIN 850 MG tablet  Commonly known as: GLUCOPHAGE   1 tablet, Oral, 2 Times Daily With Meals, NOT CURRENTLY TAKING      metoprolol  tartrate 25 MG tablet  Commonly known as: LOPRESSOR   25 mg, 2 Times Daily      naproxen 500 MG tablet  Commonly known as: NAPROSYN   500 mg, Oral, 2 Times Daily PRN      nitroglycerin 0.4 MG SL tablet  Commonly known as: NITROSTAT   0.4 mg, Every 5 Minutes PRN      ondansetron ODT 4 MG disintegrating tablet  Commonly known as: ZOFRAN-ODT   4 mg, Translingual, Every 8 Hours PRN      oseltamivir 75 MG capsule  Commonly known as: Tamiflu   75 mg, Oral, 2 Times Daily      simvastatin 10 MG tablet  Commonly known as: ZOCOR   10 mg, Nightly      TiZANidine 2 MG capsule  Commonly known as: ZANAFLEX   2 mg, 3 Times Daily             Stop These Medications      HYDROcodone-acetaminophen 7.5-325 MG per tablet  Commonly known as: NORCO     oxyCODONE-acetaminophen 5-325 MG per tablet  Commonly known as: PERCOCET  Replaced by: oxyCODONE-acetaminophen  MG per tablet              Vitals:     Vitals:    04/30/25 0805 04/30/25 1602 04/30/25 1930 05/01/25 0333   BP: 151/77 140/94 159/78 136/82   BP Location:   Left arm Left arm   Patient Position:   Lying Lying   Pulse: 65 65 63 57   Resp: 16 18 18 18   Temp: 98.1 °F (36.7 °C) 98.1 °F (36.7 °C) 97.7 °F (36.5 °C) 98.6 °F (37 °C)   TempSrc: Oral Axillary Oral Oral   SpO2: 99% 97% 97% 93%   Weight:       Height:           Labs:      Admission on 04/28/2025   Component Date Value Ref Range Status    ABO Type 04/28/2025 B   Final    RH type 04/28/2025 Positive   Final    Antibody Screen 04/28/2025 Negative   Final    T&S Expiration Date 04/28/2025 5/1/2025 11:59:59 PM   Final    Glucose 04/28/2025 114  70 - 130 mg/dL Final    Glucose 04/28/2025 177 (H)  70 - 130 mg/dL Final    Glucose 04/28/2025 220 (H)  70 - 130 mg/dL Final    Glucose 04/28/2025 194 (H)  70 - 130 mg/dL Final    Glucose 04/29/2025 175 (H)  65 - 99 mg/dL Final    BUN 04/29/2025 16  6 - 20 mg/dL Final    Creatinine 04/29/2025 0.85  0.57 - 1.00 mg/dL Final    Sodium 04/29/2025 141  136 - 145 mmol/L Final    Potassium  04/29/2025 4.4  3.5 - 5.2 mmol/L Final    Chloride 04/29/2025 106  98 - 107 mmol/L Final    CO2 04/29/2025 24.1  22.0 - 29.0 mmol/L Final    Calcium 04/29/2025 8.8  8.6 - 10.5 mg/dL Final    BUN/Creatinine Ratio 04/29/2025 18.8  7.0 - 25.0 Final    Anion Gap 04/29/2025 10.9  5.0 - 15.0 mmol/L Final    eGFR 04/29/2025 79.0  >60.0 mL/min/1.73 Final    WBC 04/29/2025 14.22 (H)  3.40 - 10.80 10*3/mm3 Final    RBC 04/29/2025 3.74 (L)  3.77 - 5.28 10*6/mm3 Final    Hemoglobin 04/29/2025 11.4 (L)  12.0 - 15.9 g/dL Final    Hematocrit 04/29/2025 34.8  34.0 - 46.6 % Final    MCV 04/29/2025 93.0  79.0 - 97.0 fL Final    MCH 04/29/2025 30.5  26.6 - 33.0 pg Final    MCHC 04/29/2025 32.8  31.5 - 35.7 g/dL Final    RDW 04/29/2025 13.1  12.3 - 15.4 % Final    RDW-SD 04/29/2025 44.0  37.0 - 54.0 fl Final    MPV 04/29/2025 9.6  6.0 - 12.0 fL Final    Platelets 04/29/2025 258  140 - 450 10*3/mm3 Final    Glucose 04/29/2025 138 (H)  70 - 130 mg/dL Final    Glucose 04/29/2025 146 (H)  70 - 130 mg/dL Final    Glucose 04/29/2025 192 (H)  70 - 130 mg/dL Final    Glucose 04/29/2025 128  70 - 130 mg/dL Final    Glucose 04/30/2025 123  70 - 130 mg/dL Final    Right Common Femoral Spont 04/30/2025 Y   Final    Right Common Femoral Competent 04/30/2025 Y   Final    Right Common Femoral Phasic 04/30/2025 Y   Final    Right Common Femoral Compress 04/30/2025 C   Final    Right Common Femoral Augment 04/30/2025 Y   Final    Right Saphenofemoral Junction Comp* 04/30/2025 C   Final    Right Profunda Femoral Compress 04/30/2025 C   Final    Right Proximal Femoral Compress 04/30/2025 C   Final    Right Mid Femoral Spont 04/30/2025 Y   Final    Right Mid Femoral Competent 04/30/2025 Y   Final    Right Mid Femoral Phasic 04/30/2025 Y   Final    Right Mid Femoral Compress 04/30/2025 C   Final    Right Mid Femoral Augment 04/30/2025 Y   Final    Right Distal Femoral Compress 04/30/2025 C   Final    Right Popliteal Spont 04/30/2025 Y   Final    Right  Popliteal Competent 04/30/2025 Y   Final    Right Popliteal Phasic 04/30/2025 Y   Final    Right Popliteal Compress 04/30/2025 C   Final    Right Popliteal Augment 04/30/2025 Y   Final    Right Posterior Tibial Compress 04/30/2025 C   Final    Right Peroneal Compress 04/30/2025 C   Final    Right Gastronemius Compress 04/30/2025 C   Final    Right Greater Saph AK Compress 04/30/2025 C   Final    Right Greater Saph BK Compress 04/30/2025 C   Final    Right Lesser Saph Compress 04/30/2025 C   Final    Left Common Femoral Spont 04/30/2025 Y   Final    Left Common Femoral Competent 04/30/2025 Y   Final    Left Common Femoral Phasic 04/30/2025 Y   Final    Left Common Femoral Compress 04/30/2025 C   Final    Left Common Femoral Augment 04/30/2025 Y   Final    Glucose 04/30/2025 151 (H)  70 - 130 mg/dL Final    Glucose 04/30/2025 115  70 - 130 mg/dL Final    Glucose 04/30/2025 154 (H)  70 - 130 mg/dL Final    Glucose 05/01/2025 200 (H)  65 - 99 mg/dL Final    BUN 05/01/2025 10  6 - 20 mg/dL Final    Creatinine 05/01/2025 0.63  0.57 - 1.00 mg/dL Final    Sodium 05/01/2025 139  136 - 145 mmol/L Final    Potassium 05/01/2025 4.2  3.5 - 5.2 mmol/L Final    Chloride 05/01/2025 102  98 - 107 mmol/L Final    CO2 05/01/2025 28.0  22.0 - 29.0 mmol/L Final    Calcium 05/01/2025 8.7  8.6 - 10.5 mg/dL Final    BUN/Creatinine Ratio 05/01/2025 15.9  7.0 - 25.0 Final    Anion Gap 05/01/2025 9.0  5.0 - 15.0 mmol/L Final    eGFR 05/01/2025 102.3  >60.0 mL/min/1.73 Final    WBC 05/01/2025 8.72  3.40 - 10.80 10*3/mm3 Final    RBC 05/01/2025 3.48 (L)  3.77 - 5.28 10*6/mm3 Final    Hemoglobin 05/01/2025 10.5 (L)  12.0 - 15.9 g/dL Final    Hematocrit 05/01/2025 31.3 (L)  34.0 - 46.6 % Final    MCV 05/01/2025 89.9  79.0 - 97.0 fL Final    MCH 05/01/2025 30.2  26.6 - 33.0 pg Final    MCHC 05/01/2025 33.5  31.5 - 35.7 g/dL Final    RDW 05/01/2025 12.7  12.3 - 15.4 % Final    RDW-SD 05/01/2025 41.5  37.0 - 54.0 fl Final    MPV 05/01/2025 9.8  6.0  - 12.0 fL Final    Platelets 05/01/2025 222  140 - 450 10*3/mm3 Final    Glucose 05/01/2025 195 (H)  70 - 130 mg/dL Final        Duplex Venous Lower Extremity - Right CAR  Result Date: 4/30/2025  Narrative:   Normal right lower extremity venous duplex scan.     XR Knee 1 or 2 View Right  Result Date: 4/28/2025  Narrative: XR KNEE 1 OR 2 VW RIGHT-  POSTOP PORTABLE 2 VIEWS RIGHT KNEE  CLINICAL INFORMATION: Post arthroplasty  FINDINGS: Prosthesis is satisfactory in position. Surgical skin staples in position. Typical soft tissue gas seen. A complicating process is not identified.  This report was finalized on 4/28/2025 11:54 AM by Dr. Zan Bagley M.D on Workstation: [id number]      XR Chest PA & Lateral  Result Date: 4/23/2025  Narrative: XR CHEST PA AND LATERAL-  HISTORY: Female who is 59 years-old, preoperative evaluation  TECHNIQUE: Frontal and lateral views of the chest  COMPARISON: 3/18/2025  FINDINGS: The heart size is normal. Aorta is tortuous. Pulmonary vasculature is unremarkable. No focal pulmonary consolidation, pleural effusion, or pneumothorax. No acute osseous process.      Impression: No focal pulmonary consolidation. Tortuous aorta. Follow-up as clinically indicated.  This report was finalized on 4/23/2025 8:21 AM by Dr. Avery Olivo M.D on Workstation: [id number]      XR Knee 1 or 2 View Right  Result Date: 4/23/2025  Narrative: XR KNEE 1 OR 2 VW RIGHT-   HISTORY:   PREOP  TECHNIQUE:  Two views of the right knee.  FINDINGS:  Prior total knee arthroplasty, no evidence of fracture or dislocation of device loosening or failure.      Impression:  Prior knee arthroplasty, no acute findings.  This report was finalized on 4/23/2025 2:08 AM by Dr. Lawrence Ackerman M.D on Workstation: [id number]        Hospital Course:   59 y.o. 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 Post-operatively the patient transferred to the floor where the patient underwent mobilization therapy. Opioids were titrated to achieve appropriate pain management to allow for participation in mobilization exercises. Vital signs and laboratory values are now within safe parameters for discharge. The dressings and/or incision is intact without signs or symptoms of active infection. Operative extremity neurovascular status remains intact as compared to the preoperative exam. Appropriate education re: incision care, activity levels, medications, and follow up visits was completed and all questions were answered. The patient is now deemed stable for discharge.    HOME: The patient progressed well with physical therapy. There were cleared for discharge to home. The patietn was sent home in good condition}.       R \"Marino\" Elan GONZALEZ MD  Orthopaedic Surgery  [facility]  [phone]                                                 Electronically signed by Luis Ansari II, MD at 05/01/25 0657       "

## 2025-05-02 NOTE — PAYOR COMM NOTE
"Yenny Berg ANDRES (59 y.o. Female)          DC SUMMARY FOR [id number]        Date of Birth   1965    Social Security Number       Address   [address] [facility] [address]    Home Phone   [phone]    MRN   [id number]       [facility]    Marital Status                               Admission Date   2025    Admission Type   Elective    Admitting Provider   Luis Ansari II, MD    Attending Provider       Department, Room/Bed   [address], P791/1       Discharge Date   2025    Discharge Disposition   Home or Self Care    Discharge Destination                                 Attending Provider: (none)   Allergies: Latex    Isolation: None   Infection: None   Code Status: Prior    Ht: 171.5 cm (67.52\")   Wt: 101 kg (222 lb 10.6 oz)    Admission Cmt: None   Principal Problem: Knee joint replacement status [Z96.659]                   Active Insurance as of 2025       Primary Coverage       Payor Plan Insurance Group Employer/Plan Group    [facility] BY GOSIA [id number]       Payor Plan Address Payor Plan Phone Number Payor Plan Fax Number Effective Dates    [address] [address]   2021 - None Entered    [facility] [address]         Subscriber Name Subscriber Birth Date Member ID       YENNY BERG 1965 [id number]                     Emergency Contacts        (Rel.) Home Phone Work Phone Mobile Phone    ADAMA CELAYA (Daughter) [phone] -- [phone]    HUSSEIN HARPER (Son) [phone] -- [phone]                 Discharge Summary        Luis Ansari II, MD at 25 0657            Orthopaedic Discharge Summary  Dr. BEBETO Llanos” Elan GONZALEZ  [phone]    NAME: Yenny Berg PCP: Kassy Antonio APRN   :  MRN: [id number]  [id number] LOS:  ADMIT: 2 days  2025   AGE/SEX: 59 y.o. 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]    Surgery Performed: RIGHT TOTAL KNEE REVISION    Discharge Medications:         Discharge Medications        New Medications        Instructions Start Date   aspirin 81 MG EC tablet   81 mg, Oral, Every 12 Hours      ondansetron 4 MG tablet  Commonly known as: Zofran   4 mg, Oral, Every 6 Hours PRN      oxyCODONE-acetaminophen  MG per tablet  Commonly known as: PERCOCET  Replaces: oxyCODONE-acetaminophen 5-325 MG per tablet   1 tablet, Oral, Every 4 Hours PRN             Continue These Medications        Instructions Start Date   acyclovir 400 MG tablet  Commonly known as: ZOVIRAX   400 mg, Oral, Daily PRN      albuterol sulfate  (90 Base) MCG/ACT inhaler  Commonly known as: PROVENTIL HFA;VENTOLIN HFA;PROAIR HFA   2 puffs, Every 4 Hours PRN      CHLORHEXIDINE GLUCONATE CLOTH EX   Apply  topically. USE AS DIRECTED      Cholecalciferol 50 MCG (2000 UT) tablet   2,000 Units, Daily      Cyanocobalamin 1000 MCG/ML kit   1,000 mcg, Every 30 Days      fish oil 1200 MG capsule capsule   1,200 mg, Oral, Daily With Breakfast, HOLDING FOR DOS      gabapentin 600 MG tablet  Commonly known as: NEURONTIN   800 mg, Nightly PRN      guaifenesin-dextromethorphan 600-30 mg  MG tablet sustained-release 12 hour   2 tablets, Oral, 2 Times Daily PRN      Jardiance 10 MG tablet tablet  Generic drug: empagliflozin   1 tablet, Daily      loratadine 10 MG tablet  Commonly known as: CLARITIN   10 mg, Daily      metFORMIN 850 MG tablet  Commonly known as: GLUCOPHAGE   1 tablet, Oral, 2 Times Daily With Meals, NOT CURRENTLY TAKING      metoprolol tartrate 25 MG tablet  Commonly known as: LOPRESSOR   25 mg, 2 Times Daily      naproxen 500 MG tablet  Commonly known as: NAPROSYN   500 mg, Oral, 2 Times Daily PRN      nitroglycerin 0.4 MG SL tablet  Commonly known as: NITROSTAT   0.4 mg, Every 5 Minutes PRN      ondansetron ODT 4 MG disintegrating tablet  Commonly known as:  ZOFRAN-ODT   4 mg, Translingual, Every 8 Hours PRN      oseltamivir 75 MG capsule  Commonly known as: Tamiflu   75 mg, Oral, 2 Times Daily      simvastatin 10 MG tablet  Commonly known as: ZOCOR   10 mg, Nightly      TiZANidine 2 MG capsule  Commonly known as: ZANAFLEX   2 mg, 3 Times Daily             Stop These Medications      HYDROcodone-acetaminophen 7.5-325 MG per tablet  Commonly known as: NORCO     oxyCODONE-acetaminophen 5-325 MG per tablet  Commonly known as: PERCOCET  Replaced by: oxyCODONE-acetaminophen  MG per tablet              Vitals:     Vitals:    04/30/25 0805 04/30/25 1602 04/30/25 1930 05/01/25 0333   BP: 151/77 140/94 159/78 136/82   BP Location:   Left arm Left arm   Patient Position:   Lying Lying   Pulse: 65 65 63 57   Resp: 16 18 18 18   Temp: 98.1 °F (36.7 °C) 98.1 °F (36.7 °C) 97.7 °F (36.5 °C) 98.6 °F (37 °C)   TempSrc: Oral Axillary Oral Oral   SpO2: 99% 97% 97% 93%   Weight:       Height:           Labs:      Admission on 04/28/2025   Component Date Value Ref Range Status    ABO Type 04/28/2025 B   Final    RH type 04/28/2025 Positive   Final    Antibody Screen 04/28/2025 Negative   Final    T&S Expiration Date 04/28/2025 5/1/2025 11:59:59 PM   Final    Glucose 04/28/2025 114  70 - 130 mg/dL Final    Glucose 04/28/2025 177 (H)  70 - 130 mg/dL Final    Glucose 04/28/2025 220 (H)  70 - 130 mg/dL Final    Glucose 04/28/2025 194 (H)  70 - 130 mg/dL Final    Glucose 04/29/2025 175 (H)  65 - 99 mg/dL Final    BUN 04/29/2025 16  6 - 20 mg/dL Final    Creatinine 04/29/2025 0.85  0.57 - 1.00 mg/dL Final    Sodium 04/29/2025 141  136 - 145 mmol/L Final    Potassium 04/29/2025 4.4  3.5 - 5.2 mmol/L Final    Chloride 04/29/2025 106  98 - 107 mmol/L Final    CO2 04/29/2025 24.1  22.0 - 29.0 mmol/L Final    Calcium 04/29/2025 8.8  8.6 - 10.5 mg/dL Final    BUN/Creatinine Ratio 04/29/2025 18.8  7.0 - 25.0 Final    Anion Gap 04/29/2025 10.9  5.0 - 15.0 mmol/L Final    eGFR 04/29/2025 79.0  >60.0  mL/min/1.73 Final    WBC 04/29/2025 14.22 (H)  3.40 - 10.80 10*3/mm3 Final    RBC 04/29/2025 3.74 (L)  3.77 - 5.28 10*6/mm3 Final    Hemoglobin 04/29/2025 11.4 (L)  12.0 - 15.9 g/dL Final    Hematocrit 04/29/2025 34.8  34.0 - 46.6 % Final    MCV 04/29/2025 93.0  79.0 - 97.0 fL Final    MCH 04/29/2025 30.5  26.6 - 33.0 pg Final    MCHC 04/29/2025 32.8  31.5 - 35.7 g/dL Final    RDW 04/29/2025 13.1  12.3 - 15.4 % Final    RDW-SD 04/29/2025 44.0  37.0 - 54.0 fl Final    MPV 04/29/2025 9.6  6.0 - 12.0 fL Final    Platelets 04/29/2025 258  140 - 450 10*3/mm3 Final    Glucose 04/29/2025 138 (H)  70 - 130 mg/dL Final    Glucose 04/29/2025 146 (H)  70 - 130 mg/dL Final    Glucose 04/29/2025 192 (H)  70 - 130 mg/dL Final    Glucose 04/29/2025 128  70 - 130 mg/dL Final    Glucose 04/30/2025 123  70 - 130 mg/dL Final    Right Common Femoral Spont 04/30/2025 Y   Final    Right Common Femoral Competent 04/30/2025 Y   Final    Right Common Femoral Phasic 04/30/2025 Y   Final    Right Common Femoral Compress 04/30/2025 C   Final    Right Common Femoral Augment 04/30/2025 Y   Final    Right Saphenofemoral Junction Comp* 04/30/2025 C   Final    Right Profunda Femoral Compress 04/30/2025 C   Final    Right Proximal Femoral Compress 04/30/2025 C   Final    Right Mid Femoral Spont 04/30/2025 Y   Final    Right Mid Femoral Competent 04/30/2025 Y   Final    Right Mid Femoral Phasic 04/30/2025 Y   Final    Right Mid Femoral Compress 04/30/2025 C   Final    Right Mid Femoral Augment 04/30/2025 Y   Final    Right Distal Femoral Compress 04/30/2025 C   Final    Right Popliteal Spont 04/30/2025 Y   Final    Right Popliteal Competent 04/30/2025 Y   Final    Right Popliteal Phasic 04/30/2025 Y   Final    Right Popliteal Compress 04/30/2025 C   Final    Right Popliteal Augment 04/30/2025 Y   Final    Right Posterior Tibial Compress 04/30/2025 C   Final    Right Peroneal Compress 04/30/2025 C   Final    Right Gastronemius Compress 04/30/2025 C    Final    Right Greater Saph AK Compress 04/30/2025 C   Final    Right Greater Saph BK Compress 04/30/2025 C   Final    Right Lesser Saph Compress 04/30/2025 C   Final    Left Common Femoral Spont 04/30/2025 Y   Final    Left Common Femoral Competent 04/30/2025 Y   Final    Left Common Femoral Phasic 04/30/2025 Y   Final    Left Common Femoral Compress 04/30/2025 C   Final    Left Common Femoral Augment 04/30/2025 Y   Final    Glucose 04/30/2025 151 (H)  70 - 130 mg/dL Final    Glucose 04/30/2025 115  70 - 130 mg/dL Final    Glucose 04/30/2025 154 (H)  70 - 130 mg/dL Final    Glucose 05/01/2025 200 (H)  65 - 99 mg/dL Final    BUN 05/01/2025 10  6 - 20 mg/dL Final    Creatinine 05/01/2025 0.63  0.57 - 1.00 mg/dL Final    Sodium 05/01/2025 139  136 - 145 mmol/L Final    Potassium 05/01/2025 4.2  3.5 - 5.2 mmol/L Final    Chloride 05/01/2025 102  98 - 107 mmol/L Final    CO2 05/01/2025 28.0  22.0 - 29.0 mmol/L Final    Calcium 05/01/2025 8.7  8.6 - 10.5 mg/dL Final    BUN/Creatinine Ratio 05/01/2025 15.9  7.0 - 25.0 Final    Anion Gap 05/01/2025 9.0  5.0 - 15.0 mmol/L Final    eGFR 05/01/2025 102.3  >60.0 mL/min/1.73 Final    WBC 05/01/2025 8.72  3.40 - 10.80 10*3/mm3 Final    RBC 05/01/2025 3.48 (L)  3.77 - 5.28 10*6/mm3 Final    Hemoglobin 05/01/2025 10.5 (L)  12.0 - 15.9 g/dL Final    Hematocrit 05/01/2025 31.3 (L)  34.0 - 46.6 % Final    MCV 05/01/2025 89.9  79.0 - 97.0 fL Final    MCH 05/01/2025 30.2  26.6 - 33.0 pg Final    MCHC 05/01/2025 33.5  31.5 - 35.7 g/dL Final    RDW 05/01/2025 12.7  12.3 - 15.4 % Final    RDW-SD 05/01/2025 41.5  37.0 - 54.0 fl Final    MPV 05/01/2025 9.8  6.0 - 12.0 fL Final    Platelets 05/01/2025 222  140 - 450 10*3/mm3 Final    Glucose 05/01/2025 195 (H)  70 - 130 mg/dL Final        Duplex Venous Lower Extremity - Right CAR  Result Date: 4/30/2025  Narrative:   Normal right lower extremity venous duplex scan.     XR Knee 1 or 2 View Right  Result Date: 4/28/2025  Narrative: XR KNEE 1  OR 2 VW RIGHT-  POSTOP PORTABLE 2 VIEWS RIGHT KNEE  CLINICAL INFORMATION: Post arthroplasty  FINDINGS: Prosthesis is satisfactory in position. Surgical skin staples in position. Typical soft tissue gas seen. A complicating process is not identified.  This report was finalized on 4/28/2025 11:54 AM by Dr. Zan Bagley M.D on Workstation: [id number]      XR Chest PA & Lateral  Result Date: 4/23/2025  Narrative: XR CHEST PA AND LATERAL-  HISTORY: Female who is 59 years-old, preoperative evaluation  TECHNIQUE: Frontal and lateral views of the chest  COMPARISON: 3/18/2025  FINDINGS: The heart size is normal. Aorta is tortuous. Pulmonary vasculature is unremarkable. No focal pulmonary consolidation, pleural effusion, or pneumothorax. No acute osseous process.      Impression: No focal pulmonary consolidation. Tortuous aorta. Follow-up as clinically indicated.  This report was finalized on 4/23/2025 8:21 AM by Dr. Avery Olivo M.D on Workstation: [id number]      XR Knee 1 or 2 View Right  Result Date: 4/23/2025  Narrative: XR KNEE 1 OR 2 VW RIGHT-   HISTORY:   PREOP  TECHNIQUE:  Two views of the right knee.  FINDINGS:  Prior total knee arthroplasty, no evidence of fracture or dislocation of device loosening or failure.      Impression:  Prior knee arthroplasty, no acute findings.  This report was finalized on 4/23/2025 2:08 AM by Dr. Lawrence Ackerman M.D on Workstation: [id number]        Hospital Course:   59 y.o. 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 Post-operatively the patient transferred to the floor where the patient underwent mobilization therapy. Opioids were titrated to achieve appropriate pain management to allow for participation in mobilization exercises. Vital signs and laboratory values are now within safe parameters  "for discharge. The dressings and/or incision is intact without signs or symptoms of active infection. Operative extremity neurovascular status remains intact as compared to the preoperative exam. Appropriate education re: incision care, activity levels, medications, and follow up visits was completed and all questions were answered. The patient is now deemed stable for discharge.    HOME: The patient progressed well with physical therapy. There were cleared for discharge to home. The patietn was sent home in good condition}.       R \"Marino\" Elan GONZALEZ MD  Orthopaedic Surgery  [facility]  [phone]                                                 Electronically signed by Luis Ansari II, MD at 05/01/25 7415       "

## 2025-05-02 NOTE — OUTREACH NOTE
Prep Survey      Flowsheet Row Responses   [facility] facility patient discharged from? [facility]   Is LACE score < 7 ? No   Eligibility Readm Mgmt   Discharge diagnosis Knee joint replacement status   Does the patient have one of the following disease processes/diagnoses(primary or secondary)? Total Joint Replacement   Does the patient have Home health ordered? Yes   [facility]   Is there a DME ordered? No   Prep survey completed? Yes            REN MALDONADO - Registered Nurse

## 2025-05-03 ENCOUNTER — APPOINTMENT (OUTPATIENT)
Dept: CARDIOLOGY | Facility: HOSPITAL | Age: 60
End: 2025-05-03
Payer: COMMERCIAL

## 2025-05-03 ENCOUNTER — APPOINTMENT (OUTPATIENT)
Dept: GENERAL RADIOLOGY | Facility: HOSPITAL | Age: 60
End: 2025-05-03
Payer: COMMERCIAL

## 2025-05-03 ENCOUNTER — HOSPITAL ENCOUNTER (EMERGENCY)
Facility: HOSPITAL | Age: 60
Discharge: HOME | End: 2025-05-03
Attending: EMERGENCY MEDICINE
Payer: COMMERCIAL

## 2025-05-03 VITALS
DIASTOLIC BLOOD PRESSURE: 45 MMHG | WEIGHT: 222.8 LBS | BODY MASS INDEX: 33.77 KG/M2 | TEMPERATURE: 99.1 F | OXYGEN SATURATION: 95 % | HEIGHT: 68 IN | RESPIRATION RATE: 18 BRPM | SYSTOLIC BLOOD PRESSURE: 110 MMHG | HEART RATE: 62 BPM

## 2025-05-03 DIAGNOSIS — Z96.651 STATUS POST RIGHT KNEE REPLACEMENT: ICD-10-CM

## 2025-05-03 DIAGNOSIS — M25.561 ACUTE POSTOPERATIVE PAIN OF RIGHT KNEE: Primary | ICD-10-CM

## 2025-05-03 DIAGNOSIS — G89.18 ACUTE POSTOPERATIVE PAIN OF RIGHT KNEE: Primary | ICD-10-CM

## 2025-05-03 LAB
ANION GAP SERPL CALCULATED.3IONS-SCNC: 4 MMOL/L (ref 5–15)
BASOPHILS # BLD AUTO: 0.04 10*3/MM3 (ref 0–0.2)
BASOPHILS NFR BLD AUTO: 0.5 % (ref 0–1.5)
BH CV LOWER VASCULAR LEFT COMMON FEMORAL AUGMENT: NORMAL
BH CV LOWER VASCULAR LEFT COMMON FEMORAL COMPETENT: NORMAL
BH CV LOWER VASCULAR LEFT COMMON FEMORAL COMPRESS: NORMAL
BH CV LOWER VASCULAR LEFT COMMON FEMORAL PHASIC: NORMAL
BH CV LOWER VASCULAR LEFT COMMON FEMORAL SPONT: NORMAL
BH CV LOWER VASCULAR RIGHT COMMON FEMORAL AUGMENT: NORMAL
BH CV LOWER VASCULAR RIGHT COMMON FEMORAL COMPETENT: NORMAL
BH CV LOWER VASCULAR RIGHT COMMON FEMORAL COMPRESS: NORMAL
BH CV LOWER VASCULAR RIGHT COMMON FEMORAL PHASIC: NORMAL
BH CV LOWER VASCULAR RIGHT COMMON FEMORAL SPONT: NORMAL
BH CV LOWER VASCULAR RIGHT DISTAL FEMORAL COMPRESS: NORMAL
BH CV LOWER VASCULAR RIGHT GASTRONEMIUS COMPRESS: NORMAL
BH CV LOWER VASCULAR RIGHT GREATER SAPH AK COMPRESS: NORMAL
BH CV LOWER VASCULAR RIGHT GREATER SAPH BK COMPRESS: NORMAL
BH CV LOWER VASCULAR RIGHT LESSER SAPH COMPRESS: NORMAL
BH CV LOWER VASCULAR RIGHT MID FEMORAL AUGMENT: NORMAL
BH CV LOWER VASCULAR RIGHT MID FEMORAL COMPETENT: NORMAL
BH CV LOWER VASCULAR RIGHT MID FEMORAL COMPRESS: NORMAL
BH CV LOWER VASCULAR RIGHT MID FEMORAL PHASIC: NORMAL
BH CV LOWER VASCULAR RIGHT MID FEMORAL SPONT: NORMAL
BH CV LOWER VASCULAR RIGHT PERONEAL COMPRESS: NORMAL
BH CV LOWER VASCULAR RIGHT POPLITEAL AUGMENT: NORMAL
BH CV LOWER VASCULAR RIGHT POPLITEAL COMPETENT: NORMAL
BH CV LOWER VASCULAR RIGHT POPLITEAL COMPRESS: NORMAL
BH CV LOWER VASCULAR RIGHT POPLITEAL PHASIC: NORMAL
BH CV LOWER VASCULAR RIGHT POPLITEAL SPONT: NORMAL
BH CV LOWER VASCULAR RIGHT POSTERIOR TIBIAL COMPRESS: NORMAL
BH CV LOWER VASCULAR RIGHT PROFUNDA FEMORAL COMPRESS: NORMAL
BH CV LOWER VASCULAR RIGHT PROXIMAL FEMORAL COMPRESS: NORMAL
BH CV LOWER VASCULAR RIGHT SAPHENOFEMORAL JUNCTION COMPRESS: NORMAL
BUN SERPL-MCNC: 12 MG/DL (ref 6–20)
BUN/CREAT SERPL: 19.4 (ref 7–25)
CALCIUM SPEC-SCNC: 8.9 MG/DL (ref 8.6–10.5)
CHLORIDE SERPL-SCNC: 105 MMOL/L (ref 98–107)
CO2 SERPL-SCNC: 26 MMOL/L (ref 22–29)
CREAT SERPL-MCNC: 0.62 MG/DL (ref 0.57–1)
D-LACTATE SERPL-SCNC: 1.3 MMOL/L (ref 0.5–2)
DEPRECATED RDW RBC AUTO: 42.1 FL (ref 37–54)
EGFRCR SERPLBLD CKD-EPI 2021: 102.7 ML/MIN/1.73
EOSINOPHIL # BLD AUTO: 0.2 10*3/MM3 (ref 0–0.4)
EOSINOPHIL NFR BLD AUTO: 2.3 % (ref 0.3–6.2)
ERYTHROCYTE [DISTWIDTH] IN BLOOD BY AUTOMATED COUNT: 12.8 % (ref 12.3–15.4)
GLUCOSE SERPL-MCNC: 205 MG/DL (ref 65–99)
HCT VFR BLD AUTO: 31 % (ref 34–46.6)
HGB BLD-MCNC: 10.2 G/DL (ref 12–15.9)
IMM GRANULOCYTES # BLD AUTO: 0.05 10*3/MM3 (ref 0–0.05)
IMM GRANULOCYTES NFR BLD AUTO: 0.6 % (ref 0–0.5)
LYMPHOCYTES # BLD AUTO: 2.49 10*3/MM3 (ref 0.7–3.1)
LYMPHOCYTES NFR BLD AUTO: 29.1 % (ref 19.6–45.3)
MCH RBC QN AUTO: 30.1 PG (ref 26.6–33)
MCHC RBC AUTO-ENTMCNC: 32.9 G/DL (ref 31.5–35.7)
MCV RBC AUTO: 91.4 FL (ref 79–97)
MONOCYTES # BLD AUTO: 0.9 10*3/MM3 (ref 0.1–0.9)
MONOCYTES NFR BLD AUTO: 10.5 % (ref 5–12)
NEUTROPHILS NFR BLD AUTO: 4.89 10*3/MM3 (ref 1.7–7)
NEUTROPHILS NFR BLD AUTO: 57 % (ref 42.7–76)
NRBC BLD AUTO-RTO: 0 /100 WBC (ref 0–0.2)
PLATELET # BLD AUTO: 245 10*3/MM3 (ref 140–450)
PMV BLD AUTO: 9.9 FL (ref 6–12)
POTASSIUM SERPL-SCNC: 4.3 MMOL/L (ref 3.5–5.2)
RBC # BLD AUTO: 3.39 10*6/MM3 (ref 3.77–5.28)
SODIUM SERPL-SCNC: 135 MMOL/L (ref 136–145)
WBC NRBC COR # BLD AUTO: 8.57 10*3/MM3 (ref 3.4–10.8)

## 2025-05-03 PROCEDURE — 99284 EMERGENCY DEPT VISIT MOD MDM: CPT

## 2025-05-03 PROCEDURE — 96374 THER/PROPH/DIAG INJ IV PUSH: CPT

## 2025-05-03 PROCEDURE — 93971 EXTREMITY STUDY: CPT | Performed by: SURGERY

## 2025-05-03 PROCEDURE — 83605 ASSAY OF LACTIC ACID: CPT | Performed by: EMERGENCY MEDICINE

## 2025-05-03 PROCEDURE — 93971 EXTREMITY STUDY: CPT

## 2025-05-03 PROCEDURE — 85025 COMPLETE CBC W/AUTO DIFF WBC: CPT | Performed by: EMERGENCY MEDICINE

## 2025-05-03 PROCEDURE — 73560 X-RAY EXAM OF KNEE 1 OR 2: CPT

## 2025-05-03 PROCEDURE — 25010000002 ONDANSETRON PER 1 MG: Performed by: EMERGENCY MEDICINE

## 2025-05-03 PROCEDURE — 96376 TX/PRO/DX INJ SAME DRUG ADON: CPT

## 2025-05-03 PROCEDURE — 96375 TX/PRO/DX INJ NEW DRUG ADDON: CPT

## 2025-05-03 PROCEDURE — 80048 BASIC METABOLIC PNL TOTAL CA: CPT | Performed by: EMERGENCY MEDICINE

## 2025-05-03 PROCEDURE — 25010000002 HYDROMORPHONE PER 4 MG: Performed by: EMERGENCY MEDICINE

## 2025-05-03 RX ORDER — HYDROMORPHONE HYDROCHLORIDE 1 MG/ML
0.5 INJECTION, SOLUTION INTRAMUSCULAR; INTRAVENOUS; SUBCUTANEOUS ONCE
Refills: 0 | Status: COMPLETED | OUTPATIENT
Start: 2025-05-03 | End: 2025-05-03

## 2025-05-03 RX ORDER — ONDANSETRON 2 MG/ML
4 INJECTION INTRAMUSCULAR; INTRAVENOUS ONCE
Status: COMPLETED | OUTPATIENT
Start: 2025-05-03 | End: 2025-05-03

## 2025-05-03 RX ORDER — OXYCODONE AND ACETAMINOPHEN 5; 325 MG/1; MG/1
2 TABLET ORAL ONCE
Refills: 0 | Status: COMPLETED | OUTPATIENT
Start: 2025-05-03 | End: 2025-05-03

## 2025-05-03 RX ORDER — SODIUM CHLORIDE 0.9 % (FLUSH) 0.9 %
10 SYRINGE (ML) INJECTION AS NEEDED
Status: DISCONTINUED | OUTPATIENT
Start: 2025-05-03 | End: 2025-05-03 | Stop reason: HOSPADM

## 2025-05-03 RX ADMIN — ONDANSETRON 4 MG: 2 INJECTION, SOLUTION INTRAMUSCULAR; INTRAVENOUS at 10:25

## 2025-05-03 RX ADMIN — HYDROMORPHONE HYDROCHLORIDE 0.5 MG: 1 INJECTION, SOLUTION INTRAMUSCULAR; INTRAVENOUS; SUBCUTANEOUS at 11:50

## 2025-05-03 RX ADMIN — OXYCODONE AND ACETAMINOPHEN 2 TABLET: 5; 325 TABLET ORAL at 15:31

## 2025-05-03 RX ADMIN — HYDROMORPHONE HYDROCHLORIDE 0.5 MG: 1 INJECTION, SOLUTION INTRAMUSCULAR; INTRAVENOUS; SUBCUTANEOUS at 10:25

## 2025-05-03 NOTE — DISCHARGE INSTRUCTIONS
Continue taking oxycodone.  Elevate your right leg.  Apply ice to your right knee as needed.  Call Dr. Ansari's office in 2 days.  Return to the emergency department if worsening symptoms, fever, or other concern.

## 2025-05-03 NOTE — ED PROVIDER NOTES
EMERGENCY DEPARTMENT ENCOUNTER    Room Number:  27/27  PCP: Kassy Antonio APRN  Historian: Patient, EMS    I initially evaluated the patient at 10:12 AM    HPI:  Chief Complaint: Right knee pain, swelling, and bruising  A complete HPI/ROS/PMH/PSH/SH/FH are unobtainable due to: Nothing  Context: Yenny Crawford is a 59 y.o. female with a medical history of arthritis, hypertension, hyperlipidemia, neuropathy, COPD, PVD who presents to the ED from home by EMS c/o acute right knee pain, swelling, and bruising.  She had a right total knee revision performed by Dr. Ansari on 4/28/2025.  She was discharged 2 days ago.  She felt better yesterday.  However when she woke up this morning, she had worsening pain, swelling, and bruising in her right knee and leg.  Pain is worse with movement.  She has taken oxycodone with minimal relief.  Denies fever, chills, chest pain, shortness of breath, or new numbness/tingling in her right leg.  She is taking baby aspirin daily.  Patient lives alone and has been ambulating with a walker.            PAST MEDICAL HISTORY  Active Ambulatory Problems     Diagnosis Date Noted    Status post knee replacement 10/02/2023    COPD (chronic obstructive pulmonary disease) 10/03/2023    CAD (coronary artery disease) 10/03/2023    Tobacco abuse 10/03/2023    HTN (hypertension) 10/03/2023    Type 2 diabetes mellitus with hyperglycemia, without long-term current use of insulin 10/03/2023    Peripheral neuropathy 10/03/2023    Chronic back pain 10/03/2023    Cellulitis of toe of right foot 05/01/2024    Bipolar disorder, current episode depressed, mild 09/21/2023    Osteomyelitis of toe 05/01/2024    Knee joint replacement status 04/28/2025    Obesity (BMI 30-39.9) 04/28/2025    S/P revision of total knee 04/29/2025     Resolved Ambulatory Problems     Diagnosis Date Noted    No Resolved Ambulatory Problems     Past Medical History:   Diagnosis Date    Arthritis     Asthma     Degenerative disc  disease, lumbar     Diabetes mellitus     GERD (gastroesophageal reflux disease)     Hyperlipidemia     Hypertension     Neuropathy     Osteoarthritis     Positive colorectal cancer screening using Cologuard test     PVD (peripheral vascular disease)     Seasonal allergies     Sleep apnea          PAST SURGICAL HISTORY  Past Surgical History:   Procedure Laterality Date    AMPUTATION DIGIT Right 05/02/2024    Procedure: SECOND AMPUTATION DIGIT, RIGHT FOOT;  Surgeon: Turner Portillo DPM;  Location: [facility] OR;  Service: Podiatry;  Laterality: Right;    APPENDECTOMY      ARTHROPLASTY Right     KNEE    ECTOPIC PREGNANCY      JOINT REPLACEMENT Bilateral     KNEE ARTHROPLASTY    LAPAROSCOPIC TUBAL LIGATION      TOTAL KNEE ARTHROPLASTY REVISION Left 10/02/2023    Procedure: TOTAL KNEE ARTHROPLASTY REVISION WITH CORI ROBOT;  Surgeon: Luis Ansari II, MD;  [place] [facility] [place] [facility] [place]  Service: Robotics - Ortho;  Laterality: Left;    TOTAL KNEE ARTHROPLASTY REVISION Right 4/28/2025    Procedure: RIGHT TOTAL KNEE REVISION;  Surgeon: Luis Ansari II, MD;  [place] [facility] [place] [facility] [place]  Service: Orthopedics;  Laterality: Right;    WISDOM TOOTH EXTRACTION           FAMILY HISTORY  Family History   Problem Relation Age of Onset    Malig Hyperthermia Neg Hx          SOCIAL HISTORY  Social History     Socioeconomic History    Marital status:    Tobacco Use    Smoking status: Every Day     Current packs/day: 0.50     Average packs/day: 0.5 packs/day for 91.3 years (45.7 ttl pk-yrs)     Types: Cigarettes     Start date: 1979    Smokeless tobacco: Never   Vaping Use    Vaping status: Former   Substance and Sexual Activity    Alcohol use: Yes     Comment: SOCIALLY    Drug use: Never    Sexual activity: Defer         ALLERGIES  Latex    REVIEW OF SYSTEMS  Review of Systems  Included in HPI  All systems reviewed and negative except for those discussed in HPI.      PHYSICAL EXAM  ED Triage Vitals [05/03/25  1008]   Temp Heart Rate Resp BP SpO2   99.1 °F (37.3 °C) 89 18 100/72 98 %      Temp src Heart Rate Source Patient Position BP Location FiO2 (%)   Oral -- -- -- --       Physical Exam      GENERAL: Awake, alert, oriented x 3.  Well-developed, well-nourished and nontoxic-appearing female.  She appears uncomfortable.    HENT: NCAT, nares patent  EYES: no scleral icterus  CV: regular rhythm, normal rate, equal bilateral pedal pulses  RESPIRATORY: normal effort, clear to auscultation bilaterally  ABDOMEN: soft, nontender  MUSCULOSKELETAL: There is diffuse tenderness of the right knee.  There is tenderness over the medial right thigh and proximal calf.  There is bruising over the medial and lateral aspect of the right thigh and medial aspect of the right calf.  NEURO: Speech is normal.  No facial droop.  PSYCH:  calm, cooperative  SKIN: warm, dry, there is no erythema/lymphangitis like fluctuance/crepitus of the right lower extremity, there is a bandage wound VAC present over the anterior aspect of the right knee with scant amount of dried blood     Vital signs and nursing notes reviewed.          LAB RESULTS  Recent Results (from the past 24 hours)   Duplex Venous Lower Extremity - Right    Collection Time: 05/03/25  2:24 PM   Result Value Ref Range    Right Common Femoral Spont Y     Right Common Femoral Competent Y     Right Common Femoral Phasic Y     Right Common Femoral Compress C     Right Common Femoral Augment Y     Right Saphenofemoral Junction Compress C     Right Profunda Femoral Compress C     Right Proximal Femoral Compress C     Right Mid Femoral Spont Y     Right Mid Femoral Competent Y     Right Mid Femoral Phasic Y     Right Mid Femoral Compress C     Right Mid Femoral Augment Y     Right Distal Femoral Compress C     Right Popliteal Spont Y     Right Popliteal Competent Y     Right Popliteal Phasic Y     Right Popliteal Compress C     Right Popliteal Augment Y     Right Posterior Tibial Compress C      Right Peroneal Compress C     Right Gastronemius Compress C     Right Greater Saph AK Compress C     Right Greater Saph BK Compress C     Right Lesser Saph Compress C     Left Common Femoral Spont Y     Left Common Femoral Competent Y     Left Common Femoral Phasic Y     Left Common Femoral Compress C     Left Common Femoral Augment Y        Ordered the above labs and reviewed the results.        RADIOLOGY  Duplex Venous Lower Extremity - Right  Result Date: 5/3/2025    Normal right lower extremity venous duplex scan.       Ordered the above noted radiological studies. Reviewed by me in PACS.            PROCEDURES  Procedures        OUTPATIENT MEDICATION MANAGEMENT:  No current Epic-ordered facility-administered medications on file.     Current Outpatient Medications Ordered in Epic   Medication Sig Dispense Refill    acyclovir (ZOVIRAX) 400 MG tablet Take 1 tablet by mouth Daily As Needed.      albuterol sulfate  (90 Base) MCG/ACT inhaler Inhale 2 puffs Every 4 (Four) Hours As Needed.      aspirin 81 MG EC tablet Take 1 tablet by mouth Every 12 (Twelve) Hours for 30 days. 60 tablet 0    CHLORHEXIDINE GLUCONATE CLOTH EX Apply  topically. USE AS DIRECTED      Cholecalciferol 50 MCG (2000 UT) tablet Take 1 tablet by mouth Daily. HOLDING FOR DOS      Cyanocobalamin 1000 MCG/ML kit Inject 1 mL into the appropriate muscle as directed by prescriber Every 30 (Thirty) Days. STATES SHE IS DUE AFTER THE 1ST      gabapentin (NEURONTIN) 600 MG tablet Take 800 mg by mouth At Night As Needed.      guaifenesin-dextromethorphan 600-30 mg (MUCINEX DM)  MG tablet sustained-release 12 hour Take 2 tablets by mouth 2 (Two) Times a Day As Needed (Cough). 20 each 0    Jardiance 10 MG tablet tablet Take 1 tablet by mouth Daily. Indications: Type 2 Diabetes      loratadine (CLARITIN) 10 MG tablet Take 1 tablet by mouth Daily.      metFORMIN (GLUCOPHAGE) 850 MG tablet Take 1 tablet by mouth 2 (Two) Times a Day With Meals. NOT  CURRENTLY TAKING  Indications: Type 2 Diabetes      metoprolol tartrate (LOPRESSOR) 25 MG tablet Take 1 tablet by mouth 2 (Two) Times a Day.      naproxen (NAPROSYN) 500 MG tablet Take 1 tablet by mouth 2 (Two) Times a Day As Needed for Mild Pain. 15 tablet 0    nitroglycerin (NITROSTAT) 0.4 MG SL tablet Place 1 tablet under the tongue Every 5 (Five) Minutes As Needed.      Omega-3 Fatty Acids (fish oil) 1200 MG capsule capsule Take 1 capsule by mouth Daily With Breakfast. HOLDING FOR DOS      ondansetron (Zofran) 4 MG tablet Take 1 tablet by mouth Every 6 (Six) Hours As Needed for Nausea or Vomiting. 30 tablet 0    ondansetron ODT (ZOFRAN-ODT) 4 MG disintegrating tablet Place 1 tablet on the tongue Every 8 (Eight) Hours As Needed for Nausea or Vomiting. 15 tablet 0    oseltamivir (Tamiflu) 75 MG capsule Take 1 capsule by mouth 2 (Two) Times a Day. 10 capsule 0    oxyCODONE-acetaminophen (PERCOCET)  MG per tablet Take 1 tablet by mouth Every 4 (Four) Hours As Needed for Moderate Pain. 40 tablet 0    simvastatin (ZOCOR) 10 MG tablet Take 1 tablet by mouth Every Night.      TiZANidine (ZANAFLEX) 2 MG capsule Take 1 capsule by mouth 3 (Three) Times a Day.             MEDICATIONS GIVEN IN ER  Medications   ondansetron (ZOFRAN) injection 4 mg (4 mg Intravenous Given 5/3/25 1025)   HYDROmorphone (DILAUDID) injection 0.5 mg (0.5 mg Intravenous Given 5/3/25 1025)   HYDROmorphone (DILAUDID) injection 0.5 mg (0.5 mg Intravenous Given 5/3/25 1150)   oxyCODONE-acetaminophen (PERCOCET) 5-325 MG per tablet 2 tablet (2 tablets Oral Given 5/3/25 1531)                   MEDICAL DECISION MAKING, PROGRESS, and CONSULTS    All labs have been independently reviewed by me.  All radiology studies have been reviewed by me and I have also reviewed the radiology report.   EKG's independently viewed and interpreted by me.  Discussion below represents my analysis of pertinent findings related to patient's condition, differential  diagnosis, treatment plan and final disposition.      Additional sources:    - Discussed/ obtained information from independent historians: EMS    - External (non-ED) record review: Patient had a right total knee revision performed by Dr. Ansari on 4/28/2025.  She was discharged on 5/1.  Duplex scan of the right leg done on 4/30/2025 was normal.    -Prescription drug monitoring program review:     N/A    - Chronic or social conditions impacting patient care (Social Determinants of Health): None          Orders placed during this visit:  Orders Placed This Encounter   Procedures    XR Knee 1 or 2 View Right    Basic Metabolic Panel    Lactic Acid, Plasma    CBC Auto Differential    CBC & Differential         Additional orders considered but not ordered:          Differential diagnosis includes, but is not limited to:    Postoperative complication, DVT, cellulitis, hematoma      Independent interpretation of labs, radiology studies, and discussions with consultants:  ED Course as of 05/04/25 1326   Sat May 03, 2025   1011 BP: 100/72 [WH]   1011 Temp: 99.1 °F (37.3 °C) [WH]   1011 Heart Rate: 89 [WH]   1011 Resp: 18 [WH]   1011 SpO2: 98 % [WH]   1011 Device (Oxygen Therapy): room air [WH]   1043 Right knee x-rays personally interpreted by me.  My personal interpretation is: Knee arthroplasty hardware is intact.  No fracture.  There is a small knee effusion. [WH]   1127 Glucose(!): 205 [WH]   1127 BUN: 12 [WH]   1127 Creatinine: 0.62 [WH]   1127 Sodium(!): 135 [WH]   1127 Lactate: 1.3 [WH]   1127 WBC: 8.57 [WH]   1127 Hemoglobin(!): 10.2  10.5 two days ago [WH]   1158 Case discussed with Dr Kuo orthopedics.  Pertinent history, exam findings, test results, ED course, and diagnoses were discussed with him.  He recommends obtaining a repeat duplex of the right leg [WH]   1424 Per the vascular tech, duplex scan of the right leg is negative for DVT [WH]   1515 Patient just returned from ultrasound.  She is feeling better  after pain medication.  Test results and diagnoses were discussed with her.  Shared decision making was discussed and she was offered admission.  She says she is comfortable going home.  She is going to call Dr. Ansari's office in 2 days.  All questions were answered.  Return precautions were discussed. [WH]      ED Course User Index  [WH] Turner Faulkner MD         COMPLEXITY OF CARE  Admission was considered but after careful review of the patient's presentation, physical examination, diagnostic results, and response to treatment the patient may be safely discharged with outpatient follow-up.      DIAGNOSIS  Final diagnoses:   Acute postoperative pain of right knee   Status post right knee replacement         DISPOSITION  DISCHARGE    Patient discharged in stable condition.    Reviewed implications of results, diagnosis, meds, responsibility to follow up, warning signs and symptoms of possible worsening, potential complications and reasons to return to ER, including worsening/persistent symptoms or other concern.    Patient/Family voiced understanding of above instructions.    Discussed plan for discharge, as there is no emergent indication for admission. Patient referred to primary care provider for BP management due to today's BP. Pt/family is agreeable and understands need for follow up and repeat testing.  Pt is aware that discharge does not mean that nothing is wrong but it indicates no emergency is present that requires admission and they must continue care with follow-up as given below or physician of their choice.     FOLLOW-UP  Luis Ansari II, MD  [address] [place] [address]  [phone]    Call in 2 days           Medication List      No changes were made to your prescriptions during this visit.                   Latest Documented Vital Signs:  AS OF 13:26 EDT VITALS:    BP - 110/45  HR - 62  TEMP - 99.1 °F (37.3 °C) (Oral)  O2 SATS - 95%            --    Please note  that portions of this were completed with a voice recognition program.       Note Disclaimer: At [facility], we believe that sharing information builds trust and better relationships. You are receiving this note because you are receiving care at [facility] or recently visited. It is possible you will see health information before a provider has talked with you about it. This kind of information can be easy to misunderstand. To help you fully understand what it means for your health, we urge you to discuss this note with your provider.             Turner Faulkner MD  05/04/25 6745

## 2025-05-03 NOTE — ED TRIAGE NOTES
Patient to ed from home via ems with complaints of bruising and pain to right knee. Patient had knee surgery Monday. Patient had wound vac placed to right knee.

## 2025-05-05 ENCOUNTER — READMISSION MANAGEMENT (OUTPATIENT)
Dept: CALL CENTER | Facility: HOSPITAL | Age: 60
End: 2025-05-05
Payer: COMMERCIAL

## 2025-05-05 NOTE — OUTREACH NOTE
Total Joint Week 1 Survey      Flowsheet Row Responses   [facility] patient discharged from? [place]   Does the patient have one of the following disease processes/diagnoses(primary or secondary)? Total Joint Replacement   Joint surgery performed? Knee   Week 1 attempt successful? Yes   Call start time 1450   Call end time 1456   Has the patient been back in either the hospital or Emergency Department since discharge? No   Discharge diagnosis Knee joint replacement status   Does the patient have all medications related to this admission filled (includes all antibiotics, pain medications, etc.) Yes   Is the patient taking all medications as directed (includes completed medication regime)? Yes   Is the patient able to teach back alternate methods of pain control? Ice, Knee-elevation/no pillow under knee   Does the patient have a follow up appointment with their surgeon? Yes   Has the patient kept scheduled appointments due by today? N/A   What is the Home health agency?  [facility]   Has home health visited the patient within 72 hours of discharge? Yes   Psychosocial issues? No   Has the patient began therapy sessions (either in the home or as an out patient)? Yes   Does the patient have a wound vac in place? N/A   Has the patient fallen since discharge? No   Did the patient receive a copy of their discharge instructions? Yes   Nursing interventions Reviewed instructions with patient   What is the patient's perception of their functional status since discharge? Improving   Is the patient able to teach back signs and symptoms of infection? Temp >100.4 for 24h or longer, Incisional drainage, Blisters around incision, Increased swelling or redness around incision (not associated with surgical edema), Changes in mobility, Severe discomfort or pain, Shortness of breath or chest pain   Is the patient able to teach back how to prevent infection? Check incision daily, Wash hands  before and after touching incision, Keep incision covered if drainage, Keep incision covered if pets in house, Shower only as directed by surgeon, No tub baths, hot tub or swimming, No lotion or creams   Is the patient able to teach back signs and symptoms of DVT? Redness in calf, Swelling in calf, Severe pain in calf, Area hot to touch, Shortness of breath or chest pain   Is the patient able to teach back home safety measures? Ability to shower   If the patient is a current smoker, are they able to teach back resources for cessation? [id number]   Is the patient/caregiver able to teach back the hierarchy of who to call/visit for symptoms/problems? PCP, Specialist, Home health nurse, Urgent Care, ED, 911 Yes   Week 1 call completed? Yes   Is the patient interested in additional calls from an ambulatory ? No   Would this patient benefit from a Referral to [facility] Social Work? No   Wrap up additional comments doing ok, PT coming to see her.   Call end time 5110            REN MALDONADO - Registered Nurse

## 2025-05-11 ENCOUNTER — HOSPITAL ENCOUNTER (OUTPATIENT)
Facility: HOSPITAL | Age: 60
Setting detail: OBSERVATION
Discharge: HOME | End: 2025-05-12
Attending: INTERNAL MEDICINE | Admitting: STUDENT IN AN ORGANIZED HEALTH CARE EDUCATION/TRAINING PROGRAM
Payer: COMMERCIAL

## 2025-05-11 ENCOUNTER — READMISSION MANAGEMENT (OUTPATIENT)
Dept: CALL CENTER | Facility: HOSPITAL | Age: 60
End: 2025-05-11
Payer: COMMERCIAL

## 2025-05-11 PROBLEM — T81.49XA POSTOPERATIVE WOUND CELLULITIS: Status: ACTIVE | Noted: 2025-05-11

## 2025-05-11 LAB — GLUCOSE BLDC GLUCOMTR-MCNC: 212 MG/DL (ref 70–130)

## 2025-05-11 PROCEDURE — 82948 REAGENT STRIP/BLOOD GLUCOSE: CPT

## 2025-05-11 PROCEDURE — 96374 THER/PROPH/DIAG INJ IV PUSH: CPT

## 2025-05-11 PROCEDURE — G0378 HOSPITAL OBSERVATION PER HR: HCPCS

## 2025-05-11 PROCEDURE — 25010000002 HYDROMORPHONE PER 4 MG: Performed by: INTERNAL MEDICINE

## 2025-05-11 PROCEDURE — 63710000001 INSULIN LISPRO (HUMAN) PER 5 UNITS: Performed by: INTERNAL MEDICINE

## 2025-05-11 PROCEDURE — 25810000003 SODIUM CHLORIDE 0.9 % SOLUTION: Performed by: INTERNAL MEDICINE

## 2025-05-11 PROCEDURE — G0379 DIRECT REFER HOSPITAL OBSERV: HCPCS

## 2025-05-11 RX ORDER — POLYETHYLENE GLYCOL 3350 17 G/17G
17 POWDER, FOR SOLUTION ORAL DAILY PRN
Status: DISCONTINUED | OUTPATIENT
Start: 2025-05-11 | End: 2025-05-12 | Stop reason: HOSPADM

## 2025-05-11 RX ORDER — IBUPROFEN 600 MG/1
1 TABLET ORAL
Status: DISCONTINUED | OUTPATIENT
Start: 2025-05-11 | End: 2025-05-12 | Stop reason: HOSPADM

## 2025-05-11 RX ORDER — ACETAMINOPHEN 160 MG/5ML
650 SOLUTION ORAL EVERY 4 HOURS PRN
Status: DISCONTINUED | OUTPATIENT
Start: 2025-05-11 | End: 2025-05-12 | Stop reason: HOSPADM

## 2025-05-11 RX ORDER — ACETAMINOPHEN 650 MG/1
650 SUPPOSITORY RECTAL EVERY 4 HOURS PRN
Status: DISCONTINUED | OUTPATIENT
Start: 2025-05-11 | End: 2025-05-12 | Stop reason: HOSPADM

## 2025-05-11 RX ORDER — ATORVASTATIN CALCIUM 20 MG/1
10 TABLET, FILM COATED ORAL DAILY
Status: DISCONTINUED | OUTPATIENT
Start: 2025-05-12 | End: 2025-05-12 | Stop reason: HOSPADM

## 2025-05-11 RX ORDER — BISACODYL 5 MG/1
5 TABLET, DELAYED RELEASE ORAL DAILY PRN
Status: DISCONTINUED | OUTPATIENT
Start: 2025-05-11 | End: 2025-05-12 | Stop reason: HOSPADM

## 2025-05-11 RX ORDER — ONDANSETRON 2 MG/ML
4 INJECTION INTRAMUSCULAR; INTRAVENOUS EVERY 6 HOURS PRN
Status: DISCONTINUED | OUTPATIENT
Start: 2025-05-11 | End: 2025-05-12 | Stop reason: HOSPADM

## 2025-05-11 RX ORDER — AMOXICILLIN 250 MG
2 CAPSULE ORAL 2 TIMES DAILY PRN
Status: DISCONTINUED | OUTPATIENT
Start: 2025-05-11 | End: 2025-05-12 | Stop reason: HOSPADM

## 2025-05-11 RX ORDER — BISACODYL 10 MG
10 SUPPOSITORY, RECTAL RECTAL DAILY PRN
Status: DISCONTINUED | OUTPATIENT
Start: 2025-05-11 | End: 2025-05-12 | Stop reason: HOSPADM

## 2025-05-11 RX ORDER — ASPIRIN 81 MG/1
81 TABLET ORAL EVERY 12 HOURS
Status: DISCONTINUED | OUTPATIENT
Start: 2025-05-11 | End: 2025-05-12 | Stop reason: HOSPADM

## 2025-05-11 RX ORDER — INSULIN LISPRO 100 [IU]/ML
2-7 INJECTION, SOLUTION INTRAVENOUS; SUBCUTANEOUS
Status: DISCONTINUED | OUTPATIENT
Start: 2025-05-11 | End: 2025-05-12 | Stop reason: HOSPADM

## 2025-05-11 RX ORDER — CHOLECALCIFEROL (VITAMIN D3) 25 MCG
2000 TABLET ORAL DAILY
Status: DISCONTINUED | OUTPATIENT
Start: 2025-05-12 | End: 2025-05-12 | Stop reason: HOSPADM

## 2025-05-11 RX ORDER — DEXTROSE MONOHYDRATE 25 G/50ML
25 INJECTION, SOLUTION INTRAVENOUS
Status: DISCONTINUED | OUTPATIENT
Start: 2025-05-11 | End: 2025-05-12 | Stop reason: HOSPADM

## 2025-05-11 RX ORDER — ALBUTEROL SULFATE 0.83 MG/ML
2.5 SOLUTION RESPIRATORY (INHALATION) EVERY 6 HOURS PRN
Status: DISCONTINUED | OUTPATIENT
Start: 2025-05-11 | End: 2025-05-12 | Stop reason: HOSPADM

## 2025-05-11 RX ORDER — NICOTINE POLACRILEX 4 MG
15 LOZENGE BUCCAL
Status: DISCONTINUED | OUTPATIENT
Start: 2025-05-11 | End: 2025-05-12 | Stop reason: HOSPADM

## 2025-05-11 RX ORDER — METOPROLOL TARTRATE 25 MG/1
25 TABLET, FILM COATED ORAL 2 TIMES DAILY
Status: DISCONTINUED | OUTPATIENT
Start: 2025-05-11 | End: 2025-05-12 | Stop reason: HOSPADM

## 2025-05-11 RX ORDER — SODIUM CHLORIDE 9 MG/ML
75 INJECTION, SOLUTION INTRAVENOUS CONTINUOUS
Status: DISCONTINUED | OUTPATIENT
Start: 2025-05-11 | End: 2025-05-12 | Stop reason: HOSPADM

## 2025-05-11 RX ORDER — ACETAMINOPHEN 325 MG/1
650 TABLET ORAL EVERY 4 HOURS PRN
Status: DISCONTINUED | OUTPATIENT
Start: 2025-05-11 | End: 2025-05-12 | Stop reason: HOSPADM

## 2025-05-11 RX ORDER — HYDROMORPHONE HYDROCHLORIDE 1 MG/ML
0.5 INJECTION, SOLUTION INTRAMUSCULAR; INTRAVENOUS; SUBCUTANEOUS
Status: DISCONTINUED | OUTPATIENT
Start: 2025-05-11 | End: 2025-05-12 | Stop reason: HOSPADM

## 2025-05-11 RX ORDER — GABAPENTIN 300 MG/1
600 CAPSULE ORAL NIGHTLY
Status: DISCONTINUED | OUTPATIENT
Start: 2025-05-11 | End: 2025-05-12 | Stop reason: HOSPADM

## 2025-05-11 RX ORDER — OXYCODONE AND ACETAMINOPHEN 10; 325 MG/1; MG/1
1 TABLET ORAL EVERY 4 HOURS PRN
Refills: 0 | Status: DISCONTINUED | OUTPATIENT
Start: 2025-05-11 | End: 2025-05-12 | Stop reason: HOSPADM

## 2025-05-11 RX ADMIN — METOPROLOL TARTRATE 25 MG: 25 TABLET, FILM COATED ORAL at 21:51

## 2025-05-11 RX ADMIN — INSULIN LISPRO 3 UNITS: 100 INJECTION, SOLUTION INTRAVENOUS; SUBCUTANEOUS at 22:43

## 2025-05-11 RX ADMIN — GABAPENTIN 600 MG: 300 CAPSULE ORAL at 21:51

## 2025-05-11 RX ADMIN — SODIUM CHLORIDE 75 ML/HR: 9 INJECTION, SOLUTION INTRAVENOUS at 23:37

## 2025-05-11 RX ADMIN — HYDROMORPHONE HYDROCHLORIDE 0.5 MG: 1 INJECTION, SOLUTION INTRAMUSCULAR; INTRAVENOUS; SUBCUTANEOUS at 21:52

## 2025-05-11 RX ADMIN — ASPIRIN 81 MG: 81 TABLET, COATED ORAL at 21:51

## 2025-05-11 RX ADMIN — OXYCODONE AND ACETAMINOPHEN 1 TABLET: 325; 10 TABLET ORAL at 19:31

## 2025-05-11 RX ADMIN — OXYCODONE AND ACETAMINOPHEN 1 TABLET: 325; 10 TABLET ORAL at 23:37

## 2025-05-11 NOTE — NURSING NOTE
Pt arrived to unit around 1820. Rocking in bed in pain. A/o x4, cooperative. Been increasingly getting worse since the surgery. Incision to right knee is stapled well approximated, pt admits to removing the sutures on her own yesterday due to the swelling and pain getting bad. Pt states she was clean/sterile about it. Redness to knee and heat to knee and lower leg.     Pt states no falls since surgery. Pt states neuropathy in  b/l feet. Pulses good.     Dr. Monsivais notified of pt arrival and condition.     Old toe amputations to right foot .

## 2025-05-11 NOTE — PLAN OF CARE
Problem: Adult Inpatient Plan of Care  Goal: Plan of Care Review  Outcome: Not Progressing  Goal: Patient-Specific Goal (Individualized)  Outcome: Not Progressing  Goal: Absence of Hospital-Acquired Illness or Injury  Outcome: Not Progressing  Intervention: Identify and Manage Fall Risk  Recent Flowsheet Documentation  Taken 5/11/2025 1800 by Barby Weems, RN  Safety Promotion/Fall Prevention:   fall prevention program maintained   assistive device/personal items within reach   clutter free environment maintained   safety round/check completed   room organization consistent   nonskid shoes/slippers when out of bed  Intervention: Prevent Skin Injury  Recent Flowsheet Documentation  Taken 5/11/2025 1800 by Barby Weems, RN  Body Position:   position changed independently   sitting up in bed   weight shifting  Goal: Optimal Comfort and Wellbeing  Outcome: Not Progressing  Goal: Readiness for Transition of Care  Outcome: Not Progressing  Intervention: Mutually Develop Transition Plan  Recent Flowsheet Documentation  Taken 5/11/2025 1907 by Barby Weems, RN  Equipment Currently Used at Home:   cane, straight   walker, standard  Taken 5/11/2025 1906 by Barby Weems, RN  Equipment Currently Used at Home:   walker, standard   cane, straight  Transportation Anticipated: family or friend will provide  Patient/Family Anticipated Services at Transition:   Patient/Family Anticipates Transition to: home with family     Problem: Pain Acute  Goal: Optimal Pain Control and Function  Outcome: Not Progressing   Goal Outcome Evaluation:

## 2025-05-12 ENCOUNTER — READMISSION MANAGEMENT (OUTPATIENT)
Dept: CALL CENTER | Facility: HOSPITAL | Age: 60
End: 2025-05-12
Payer: COMMERCIAL

## 2025-05-12 VITALS
HEIGHT: 68 IN | TEMPERATURE: 98.6 F | WEIGHT: 215 LBS | SYSTOLIC BLOOD PRESSURE: 145 MMHG | HEART RATE: 89 BPM | RESPIRATION RATE: 18 BRPM | BODY MASS INDEX: 32.58 KG/M2 | OXYGEN SATURATION: 96 % | DIASTOLIC BLOOD PRESSURE: 69 MMHG

## 2025-05-12 LAB
ANION GAP SERPL CALCULATED.3IONS-SCNC: 11.3 MMOL/L (ref 5–15)
BASOPHILS # BLD AUTO: 0.06 10*3/MM3 (ref 0–0.2)
BASOPHILS NFR BLD AUTO: 0.6 % (ref 0–1.5)
BUN SERPL-MCNC: 11 MG/DL (ref 6–20)
BUN/CREAT SERPL: 15.5 (ref 7–25)
CALCIUM SPEC-SCNC: 9.3 MG/DL (ref 8.6–10.5)
CHLORIDE SERPL-SCNC: 102 MMOL/L (ref 98–107)
CO2 SERPL-SCNC: 24.7 MMOL/L (ref 22–29)
CREAT SERPL-MCNC: 0.71 MG/DL (ref 0.57–1)
DEPRECATED RDW RBC AUTO: 41.2 FL (ref 37–54)
EGFRCR SERPLBLD CKD-EPI 2021: 98.1 ML/MIN/1.73
EOSINOPHIL # BLD AUTO: 0.27 10*3/MM3 (ref 0–0.4)
EOSINOPHIL NFR BLD AUTO: 2.5 % (ref 0.3–6.2)
ERYTHROCYTE [DISTWIDTH] IN BLOOD BY AUTOMATED COUNT: 12.4 % (ref 12.3–15.4)
GLUCOSE BLDC GLUCOMTR-MCNC: 131 MG/DL (ref 70–130)
GLUCOSE SERPL-MCNC: 140 MG/DL (ref 65–99)
HCT VFR BLD AUTO: 33.2 % (ref 34–46.6)
HGB BLD-MCNC: 10.7 G/DL (ref 12–15.9)
IMM GRANULOCYTES # BLD AUTO: 0.1 10*3/MM3 (ref 0–0.05)
IMM GRANULOCYTES NFR BLD AUTO: 0.9 % (ref 0–0.5)
LYMPHOCYTES # BLD AUTO: 3.21 10*3/MM3 (ref 0.7–3.1)
LYMPHOCYTES NFR BLD AUTO: 30.2 % (ref 19.6–45.3)
MCH RBC QN AUTO: 29.4 PG (ref 26.6–33)
MCHC RBC AUTO-ENTMCNC: 32.2 G/DL (ref 31.5–35.7)
MCV RBC AUTO: 91.2 FL (ref 79–97)
MONOCYTES # BLD AUTO: 1.24 10*3/MM3 (ref 0.1–0.9)
MONOCYTES NFR BLD AUTO: 11.7 % (ref 5–12)
NEUTROPHILS NFR BLD AUTO: 5.75 10*3/MM3 (ref 1.7–7)
NEUTROPHILS NFR BLD AUTO: 54.1 % (ref 42.7–76)
NRBC BLD AUTO-RTO: 0 /100 WBC (ref 0–0.2)
PLATELET # BLD AUTO: 346 10*3/MM3 (ref 140–450)
PMV BLD AUTO: 9.4 FL (ref 6–12)
POTASSIUM SERPL-SCNC: 4.7 MMOL/L (ref 3.5–5.2)
RBC # BLD AUTO: 3.64 10*6/MM3 (ref 3.77–5.28)
SODIUM SERPL-SCNC: 138 MMOL/L (ref 136–145)
WBC NRBC COR # BLD AUTO: 10.63 10*3/MM3 (ref 3.4–10.8)

## 2025-05-12 PROCEDURE — G0378 HOSPITAL OBSERVATION PER HR: HCPCS

## 2025-05-12 PROCEDURE — 80048 BASIC METABOLIC PNL TOTAL CA: CPT | Performed by: INTERNAL MEDICINE

## 2025-05-12 PROCEDURE — 97530 THERAPEUTIC ACTIVITIES: CPT

## 2025-05-12 PROCEDURE — 96361 HYDRATE IV INFUSION ADD-ON: CPT

## 2025-05-12 PROCEDURE — 82948 REAGENT STRIP/BLOOD GLUCOSE: CPT

## 2025-05-12 PROCEDURE — 85025 COMPLETE CBC W/AUTO DIFF WBC: CPT | Performed by: INTERNAL MEDICINE

## 2025-05-12 PROCEDURE — 97162 PT EVAL MOD COMPLEX 30 MIN: CPT

## 2025-05-12 RX ADMIN — Medication 2000 UNITS: at 08:34

## 2025-05-12 RX ADMIN — OXYCODONE AND ACETAMINOPHEN 1 TABLET: 325; 10 TABLET ORAL at 03:47

## 2025-05-12 RX ADMIN — SENNOSIDES AND DOCUSATE SODIUM 2 TABLET: 50; 8.6 TABLET ORAL at 08:34

## 2025-05-12 RX ADMIN — TIZANIDINE 2 MG: 4 TABLET ORAL at 03:47

## 2025-05-12 RX ADMIN — OXYCODONE AND ACETAMINOPHEN 1 TABLET: 325; 10 TABLET ORAL at 08:34

## 2025-05-12 RX ADMIN — METOPROLOL TARTRATE 25 MG: 25 TABLET, FILM COATED ORAL at 08:34

## 2025-05-12 RX ADMIN — ASPIRIN 81 MG: 81 TABLET, COATED ORAL at 11:06

## 2025-05-12 NOTE — OUTREACH NOTE
Prep Survey      Flowsheet Row Responses   [facility] [place] facility patient discharged from? [place]   Is LACE score < 7 ? No   Eligibility Readm Mgmt   Discharge diagnosis Postoperative wound cellulitis   Does the patient have one of the following disease processes/diagnoses(primary or secondary)? Other   Does the patient have Home health ordered? No   Is there a DME ordered? No   Medication alerts for this patient see AVS   Prep survey completed? Yes            Lupe TATE - Registered Nurse              Lupe TATE - Registered Nurse

## 2025-05-12 NOTE — PLAN OF CARE
Goal Outcome Evaluation:  Plan of Care Reviewed With: patient        Progress: no change  Outcome Evaluation: Pt AOx4, VSS, RA, severe pain, hot to touch and red, swollen R knee. Incisions edges still approximated. Pt restless and continuing to rub the knee. Percoct and dilaudid given prn for pain. BRP, Up x SBA. call light within reach, no further needs at this time. Plan pending, awaiting ortho and ID consults.

## 2025-05-12 NOTE — H&P
HISTORY AND PHYSICAL   [facility]        Date of Admission: 2025  Patient Identification:  Name: Yenny Crawford  Age: 59 y.o.  Sex: female  :  1965  MRN: [id number]                     Primary Care Physician: Kassy Antonio APRN    Chief Complaint:  59 year old female presented to the emergency room at [facility] with pain, swelling, redness of her right knee; she had a knee replacement done about two weeks ago; the area around the incision has been warm, tender but not draining; she has had subjective fever and chills; she denies injury    History of Present Illness:   As above    Past Medical History:  Past Medical History:   Diagnosis Date    Arthritis     Asthma     COPD (chronic obstructive pulmonary disease)     Degenerative disc disease, lumbar     CHRONIC    Diabetes mellitus     GERD (gastroesophageal reflux disease)     Hyperlipidemia     Hypertension     Neuropathy     CONOR FEET    Osteoarthritis     LEFT KNEE    Positive colorectal cancer screening using Cologuard test     PVD (peripheral vascular disease)     Seasonal allergies     Sleep apnea     NO CURRENT DEVICE     Past Surgical History:  Past Surgical History:   Procedure Laterality Date    AMPUTATION DIGIT Right 2024    Procedure: SECOND AMPUTATION DIGIT, RIGHT FOOT;  Surgeon: Turner Portillo DPM;  Location: [facility];  Service: Podiatry;  Laterality: Right;    APPENDECTOMY      ARTHROPLASTY Right     KNEE    ECTOPIC PREGNANCY      JOINT REPLACEMENT Bilateral     KNEE ARTHROPLASTY    LAPAROSCOPIC TUBAL LIGATION      TOTAL KNEE ARTHROPLASTY REVISION Left 10/02/2023    Procedure: TOTAL KNEE ARTHROPLASTY REVISION WITH CORI ROBOT;  Surgeon: Luis Ansari II, MD;  Location: [facility];  Service: Robotics - Ortho;  Laterality: Left;    TOTAL KNEE ARTHROPLASTY REVISION Right 2025    Procedure: TOTAL KNEE ARTHROPLASTY REVISION WITH CORI ROBOT;  Surgeon: Luis Ansari II, MD;   Location: [facility];  Service: Orthopedics;  Laterality: Right;    WISDOM TOOTH EXTRACTION        Home Meds:  Medications Prior to Admission   Medication Sig Dispense Refill Last Dose/Taking    aspirin 81 MG EC tablet Take 1 tablet by mouth Every 12 (Twelve) Hours for 30 days. 60 tablet 0 5/11/2025    Cholecalciferol 50 MCG (2000 UT) tablet Take 1 tablet by mouth Daily. HOLDING FOR DOS   5/11/2025    Cyanocobalamin 1000 MCG/ML kit Inject 1 mL into the appropriate muscle as directed by prescriber Every 30 (Thirty) Days. STATES SHE IS DUE AFTER THE 1ST   Patient Taking Differently    gabapentin (NEURONTIN) 600 MG tablet Take 800 mg by mouth At Night As Needed.   5/11/2025    guaifenesin-dextromethorphan 600-30 mg (MUCINEX DM)  MG tablet sustained-release 12 hour Take 2 tablets by mouth 2 (Two) Times a Day As Needed (Cough). 20 each 0 Patient Taking Differently    Jardiance 10 MG tablet tablet Take 1 tablet by mouth Daily. Indications: Type 2 Diabetes   5/11/2025    loratadine (CLARITIN) 10 MG tablet Take 1 tablet by mouth Daily.   Patient Taking Differently    metFORMIN (GLUCOPHAGE) 850 MG tablet Take 1 tablet by mouth 2 (Two) Times a Day With Meals. NOT CURRENTLY TAKING  Indications: Type 2 Diabetes   Patient Taking Differently    metoprolol tartrate (LOPRESSOR) 25 MG tablet Take 1 tablet by mouth 2 (Two) Times a Day.   5/11/2025    naproxen (NAPROSYN) 500 MG tablet Take 1 tablet by mouth 2 (Two) Times a Day As Needed for Mild Pain. 15 tablet 0 Patient Taking Differently    Omega-3 Fatty Acids (fish oil) 1200 MG capsule capsule Take 1 capsule by mouth Daily With Breakfast. HOLDING FOR DOS   Patient Taking Differently    ondansetron (Zofran) 4 MG tablet Take 1 tablet by mouth Every 6 (Six) Hours As Needed for Nausea or Vomiting. 30 tablet 0 Past Month    oxyCODONE-acetaminophen (PERCOCET)  MG per tablet Take 1 tablet by mouth Every 4 (Four) Hours As Needed for Moderate Pain. 40 tablet 0 5/11/2025     simvastatin (ZOCOR) 10 MG tablet Take 1 tablet by mouth Every Night.   5/10/2025    TiZANidine (ZANAFLEX) 2 MG capsule Take 1 capsule by mouth 3 (Three) Times a Day.   5/11/2025    acyclovir (ZOVIRAX) 400 MG tablet Take 1 tablet by mouth Daily As Needed.       albuterol sulfate  (90 Base) MCG/ACT inhaler Inhale 2 puffs Every 4 (Four) Hours As Needed.       CHLORHEXIDINE GLUCONATE CLOTH EX Apply  topically. USE AS DIRECTED       nitroglycerin (NITROSTAT) 0.4 MG SL tablet Place 1 tablet under the tongue Every 5 (Five) Minutes As Needed.       ondansetron ODT (ZOFRAN-ODT) 4 MG disintegrating tablet Place 1 tablet on the tongue Every 8 (Eight) Hours As Needed for Nausea or Vomiting. 15 tablet 0     oseltamivir (Tamiflu) 75 MG capsule Take 1 capsule by mouth 2 (Two) Times a Day. 10 capsule 0        Allergies:  Allergies   Allergen Reactions    Latex Rash     Immunizations:  Immunization History   Administered Date(s) Administered    Fluzone High-Dose 65+yrs 08/28/2016    Influenza TIV (IM) 08/28/2016     Social History:   Social History     Social History Narrative    Not on file     Social History     Socioeconomic History    Marital status:    Tobacco Use    Smoking status: Every Day     Current packs/day: 0.50     Average packs/day: 0.5 packs/day for 91.4 years (45.7 ttl pk-yrs)     Types: Cigarettes     Start date: 1979    Smokeless tobacco: Never   Vaping Use    Vaping status: Former   Substance and Sexual Activity    Alcohol use: Yes     Comment: SOCIALLY    Drug use: Never    Sexual activity: Defer       Family History:  Family History   Problem Relation Age of Onset    Malig Hyperthermia Neg Hx         Review of Systems  See history of present illness and past medical history.  Patient denies headache, dizziness, syncope, falls, trauma, change in vision, change in hearing, change in taste, changes in weight, changes in appetite, focal weakness, numbness, or paresthesia.  Patient denies chest pain,  "palpitations, dyspnea, orthopnea, PND, cough, sinus pressure, rhinorrhea, epistaxis, hemoptysis, nausea, vomiting,hematemesis, diarrhea, constipation or hematochezia.  Denies cold or heat intolerance, polydipsia, polyuria, polyphagia. Denies hematuria, pyuria, dysuria, hesitancy, frequency or urgency. Denies consumption of raw and under cooked meats foods or change in water source.  Denies fever, chills, sweats, night sweats.  Denies missing any routine medications. Remainder of ROS is negative.    Objective:  T Max 24 hrs: Temp (24hrs), Av.5 °F (36.9 °C), Min:98.4 °F (36.9 °C), Max:98.5 °F (36.9 °C)    Vitals Ranges:   Temp:  [98.4 °F (36.9 °C)-98.5 °F (36.9 °C)] 98.4 °F (36.9 °C)  Heart Rate:  [74-80] 74  Resp:  [16] 16  BP: (133-141)/(69-86) 133/86      Exam:  /86 (BP Location: Right arm, Patient Position: Lying)   Pulse 74   Temp 98.4 °F (36.9 °C) (Oral)   Resp 16   Ht 172.7 cm (68\")   Wt 97.5 kg (215 lb)   SpO2 93%   BMI 32.69 kg/m²     General Appearance:    Alert, cooperative, no distress, appears stated age   Head:    Normocephalic, without obvious abnormality, atraumatic   Eyes:    PERRL, conjunctivae/corneas clear, EOM's intact, both eyes   Ears:    Normal external ear canals, both ears   Nose:   Nares normal, septum midline, mucosa normal, no drainage    or sinus tenderness   Throat:   Lips, mucosa, and tongue normal   Neck:   Supple, symmetrical, trachea midline, no adenopathy;     thyroid:  no enlargement/tenderness/nodules; no carotid    bruit or JVD   Back:     Symmetric, no curvature, ROM normal, no CVA tenderness   Lungs:     Clear to auscultation bilaterally, respirations unlabored   Chest Wall:    No tenderness or deformity    Heart:    Regular rate and rhythm, S1 and S2 normal, no murmur, rub   or gallop   Abdomen:     Soft, nontender, bowel sounds active all four quadrants,     no masses, no hepatomegaly, no splenomegaly   Extremities:   Extremities normal, atraumatic, right " knee with well approximated incision; staples in place; erythema, wamrth, edema noted                       .    Data Review:  Labs in chart were reviewed.             Imaging Results (All)       None              Assessment:  Active Hospital Problems    Diagnosis  POA    **Postoperative wound cellulitis [T81.49XA]  Yes      Resolved Hospital Problems   No resolved problems to display.   Copd  Obesity  Diabetes  hypertension  Hyperiipidemia  Anemia  Sleep apnea  hyperlipidemia    Plan:  Dr healy was on call for dr ayala  Per ed provider he requested to hold off on antibiotics  Ask id to see her  Npo after midnight  Pain control  Accu checks, sliding scale  Dw patient and ed provider at [facility] as well as transfer center    Erica Monsivais MD  5/11/2025  20:42 EDT

## 2025-05-12 NOTE — PLAN OF CARE
"Goal Outcome Evaluation:  Plan of Care Reviewed With: patient           Outcome Evaluation: Yenny Crawford is a 59 year old female seen for physical therapy evaluation s/p admission for suspected joint infection. Pt. had R TKA done on April 28th 2024, and notes that she went to the ED due to severe pain, discoloration and \"puffyness\" of her incision. She notes she did see some drainage as well originally. She notes that this makes her uneasy. According to chart, her WBC count is normal, incision is dry/intact and absent of any drainage, normal redness and warmth as well. Today, I did see her incision, and it appears clean, dry and intact. She performs bed mobility and STS with mod indep, and ambulates 8 feet to bedside chair with standby-supervision A using RW. Knee flexion on R side is 90 in sitting. D/c orders signed, however pt. reports that she lives alone and is nervous about being able to care for herself. I do not necessarily think she needs rehab, I would recommend home with HH, and follow up outpatient as needed for symptom management.    Anticipated Discharge Disposition (PT): home with home health                        "

## 2025-05-12 NOTE — OUTREACH NOTE
Total Joint Week 2 Survey      Flowsheet Row Responses   [facility] facility patient discharged from? [place]   Does the patient have one of the following disease processes/diagnoses(primary or secondary)? Total Joint Replacement   Joint surgery performed? Knee   Week 2 attempt successful? No   Unsuccessful attempts Attempt 1   Revoke Readmitted            Kassidy RIVERS - Registered Nurse

## 2025-05-12 NOTE — DISCHARGE PLACEMENT REQUEST
"Yenny Berg (59 y.o. Female)       Date of Birth   1965    Social Security Number       Address   [address] [facility] [address]    Home Phone   [phone]    MRN   [id number]       [facility]    Marital Status                               Admission Date   5/11/2025    Admission Type   Urgent    Admitting Provider   Erica Monsivais MD    Attending Provider   Kathy Fisher MD    Department, Room/Bed   [address], P790/1       Discharge Date       Discharge Disposition   Home or Self Care    Discharge Destination                                 Attending Provider: Kathy Fisher MD    Allergies: Latex    Isolation: None   Infection: None   Code Status: CPR    Ht: 172.7 cm (68\")   Wt: 97.5 kg (215 lb)    Admission Cmt: None   Principal Problem: Postoperative wound cellulitis [T81.49XA]                   Active Insurance as of 5/11/2025       Primary Coverage       Payor Plan Insurance Group Employer/Plan Group    [facility] [facility] BY GOS[employer] [facility] BY [employer]OSIA [id number]       Payor Plan Address Payor Plan Phone Number Payor Plan Fax Number Effective Dates    [address] [address]   1/1/2021 - None Entered    [facility] [address]         Subscriber Name Subscriber Birth Date Member ID       YENNY BERG 1965 [id number]                     Emergency Contacts        (Rel.) Home Phone Work Phone Mobile Phone    ADAMA CELAYA (Daughter) [phone] -- [phone]    HUSSEIN HARPER (Son) [phone] -- [phone]                "

## 2025-05-12 NOTE — NURSING NOTE
Patient was given discharge instructions, with her daughters present.  Patient was not interested in receiving any information regarding s/s to call MD for or when to return to ER.  She stated she has an appointment with Dr Ansari in a week.  No new medications prescribed at discharge.  Patient had no questions for me.  Wanted to walk to the car - but told her that I was going to get a w/c for her.

## 2025-05-12 NOTE — PROGRESS NOTES
Patient is roughly 2 weeks out from knee replacement surgery.  She had some pain and swelling and presented to an outside emergency room where there was concern about cellulitis and infection.  She has been afebrile with a normal white count.  She has regained excellent range of motion with physical therapy.  For some unknown reason, she was transferred here instead of being sent out with outpatient follow-up.  This morning her knee incision looks great.  There is no concern for infection.  She has knee flexion past 90 degrees.  There is no drainage from her incision.  Unfortunately, she has been wildly noncompliant with wound management.  She is touching the incision herself and has been rubbing hand  and other ointments directly on the incision.  I explained to her that although she does not have an infection right now, she is certainly going to cause one with her actions.  She does have a little bit of prepatellar erythema likely representing a little bit of blood in her prepatellar bursa.  This is really not of the clinical concern right now.  Antibiotics will be stopped and she may be discharged today.  Follow-up in the office as scheduled.  No need for intervention at this time

## 2025-05-12 NOTE — DISCHARGE SUMMARY
Patient Name: Yenny Crawford  : 1965  MRN: [id number]    Date of Admission: 2025  Date of Discharge:  2025  Primary Care Physician: Kassy Antonio APRN      Chief Complaint:   No chief complaint on file.      Discharge Diagnoses     Active Hospital Problems    Diagnosis  POA    **Postoperative wound cellulitis [T81.49XA]  Yes      Resolved Hospital Problems   No resolved problems to display.        Hospital Course     Ms. Crawford is a 59 y.o. female with a history of COPD, hypertension, and osteoarthritis who presented to [facility] as a transfer from [facility] ER initially complaining of knee pain.  Please see the admitting history and physical for further details.      Patient presented to [facility] emergency room with right knee pain and swelling.  She had concern for wound infection.  The patient had a knee replacement around 2 weeks ago.  She was not febrile and had a normal WBC.  There was concern for erythema and swelling, so the patient was transferred to [facility] for admission.  She was evaluated by Dr. Ansari this morning.  He evaluated her incision and felt that it looks great.  He did not have any concern for any infection.  Again, she was afebrile with a normal WBC.  There was no drainage from the wound.  Dr. Ansari discontinued her antibiotics and cleared her for discharge home.  The patient has been noncompliant with wound management.  Dr. Ansari educated her on how to appropriately care for her wound and she expressed understanding.  The patient should follow-up with orthopedic surgery as previously scheduled.  The patient was counseled on reasons to return to the hospital which would include but not be limited to fever, chills, wound drainage.  She expressed understanding.  She is being discharged in satisfactory condition.    Day of Discharge     Subjective:  No acute events overnight.  Patient states she is feeling well.  She has been  afebrile.  No chest pain or shortness of breath.  She is agreeable with discharge home today.    Physical Exam:  Temp:  [98.4 °F (36.9 °C)-98.6 °F (37 °C)] 98.6 °F (37 °C)  Heart Rate:  [64-89] 89  Resp:  [16-18] 18  BP: (133-145)/(69-86) 145/69  Body mass index is 32.69 kg/m².  Physical Exam  General: Alert, no acute distress.  Sitting up in bed.  Answers questions appropriately.  ENT: No conjunctival injection or scleral icterus. Moist mucous membranes.   Neuro: Eyes open and moving in all directions, strength grossly normal, face symmetric, no focal deficits.   Lungs: Clear to auscultation bilaterally. No wheeze or crackles. No distress.   Heart: RRR, no murmurs.   Abdomen: Soft, non-tender, non-distended. Normal bowel sounds.  Ext: Right knee incision with dressing clean/dry/intact, trace edema right lower extremity  Skin: No rashes or lesions. IV site without swelling or erythema.     Consultants     Consult Orders (all) (From admission, onward)       Start     Ordered    05/11/25 2043  Inpatient Infectious Diseases Consult  Once,   Status:  Canceled        Specialty:  Infectious Diseases  Provider:  Saskia Mcpherson MD    05/11/25 2042 05/11/25 1912  Inpatient Orthopedic Surgery Consult  Once        Specialty:  Orthopedic Surgery  Provider:  Luis Ansari II, MD    05/11/25 1911                  Procedures     * Surgery not found *    Imaging Results (All)       None          Results for orders placed during the hospital encounter of 05/03/25    Duplex Venous Lower Extremity - Right    Interpretation Summary    Normal right lower extremity venous duplex scan.      Pertinent Labs     Results from last 7 days   Lab Units 05/12/25  0435   WBC 10*3/mm3 10.63   HEMOGLOBIN g/dL 10.7*   PLATELETS 10*3/mm3 346     Results from last 7 days   Lab Units 05/12/25  0435   SODIUM mmol/L 138   POTASSIUM mmol/L 4.7   CHLORIDE mmol/L 102   CO2 mmol/L 24.7   BUN mg/dL 11   CREATININE mg/dL 0.71   GLUCOSE mg/dL 140*  "  EGFR mL/min/1.73 98.1       Results from last 7 days   Lab Units 05/12/25  0435   CALCIUM mg/dL 9.3               Invalid input(s): \"LDLCALC\"          Test Results Pending at Discharge     Pending Results       None              Discharge Details        Discharge Medications        Continue These Medications        Instructions Start Date   acyclovir 400 MG tablet  Commonly known as: ZOVIRAX   400 mg, Oral, Daily PRN      albuterol sulfate  (90 Base) MCG/ACT inhaler  Commonly known as: PROVENTIL HFA;VENTOLIN HFA;PROAIR HFA   2 puffs, Every 4 Hours PRN      Aspirin Low Dose 81 MG EC tablet  Generic drug: aspirin   81 mg, Oral, Every 12 Hours      CHLORHEXIDINE GLUCONATE CLOTH EX   Apply  topically. USE AS DIRECTED      Cholecalciferol 50 MCG (2000 UT) tablet   2,000 Units, Daily      Cyanocobalamin 1000 MCG/ML kit   1,000 mcg, Every 30 Days      fish oil 1200 MG capsule capsule   1,200 mg, Daily With Breakfast      gabapentin 600 MG tablet  Commonly known as: NEURONTIN   800 mg, Nightly PRN      guaifenesin-dextromethorphan 600-30 mg  MG tablet sustained-release 12 hour   2 tablets, Oral, 2 Times Daily PRN      Jardiance 10 MG tablet tablet  Generic drug: empagliflozin   1 tablet, Daily      loratadine 10 MG tablet  Commonly known as: CLARITIN   10 mg, Daily      metFORMIN 850 MG tablet  Commonly known as: GLUCOPHAGE   1 tablet, 2 Times Daily With Meals      metoprolol tartrate 25 MG tablet  Commonly known as: LOPRESSOR   25 mg, 2 Times Daily      naproxen 500 MG tablet  Commonly known as: NAPROSYN   500 mg, Oral, 2 Times Daily PRN      nitroglycerin 0.4 MG SL tablet  Commonly known as: NITROSTAT   0.4 mg, Every 5 Minutes PRN      oxyCODONE-acetaminophen  MG per tablet  Commonly known as: PERCOCET   1 tablet, Oral, Every 4 Hours PRN      simvastatin 10 MG tablet  Commonly known as: ZOCOR   10 mg, Nightly      TiZANidine 2 MG capsule  Commonly known as: ZANAFLEX   2 mg, 3 Times Daily         "     Stop These Medications      ondansetron 4 MG tablet  Commonly known as: Zofran     ondansetron ODT 4 MG disintegrating tablet  Commonly known as: ZOFRAN-ODT     oseltamivir 75 MG capsule  Commonly known as: Tamiflu              Allergies   Allergen Reactions    Latex Rash       Discharge Disposition:  Home or Self Care      Discharge Diet:  Diet Order   Procedures    Diet: Regular/House, Diabetic; Consistent Carbohydrate; Fluid Consistency: Thin (IDDSI 0)       Discharge Activity: Activity as tolerated      CODE STATUS:    Code Status and Medical Interventions: CPR (Attempt to Resuscitate); Full Support   Ordered at: 05/11/25 1912     Code Status (Patient has no pulse and is not breathing):    CPR (Attempt to Resuscitate)     Medical Interventions (Patient has pulse or is breathing):    Full Support       No future appointments.   Follow-up Information       Kassy Antonio, LAZARO. Schedule an appointment as soon as possible for a visit.    Specialty: Family Medicine  Contact information:  [address]  [address]  [facility] [id number] [phone]               Luis Ansari II, MD Follow up.    Specialty: Orthopedic Surgery  Why: Follow up in the office as scheduled  Contact information:  [address]  [facility] [address]  [phone]                             Time Spent on Discharge:  Greater than 30 minutes      Kathy Fisher MD  [facility]  05/12/25  09:57 EDT

## 2025-05-12 NOTE — THERAPY EVALUATION
Patient Name: Yenny Crawford  : 1965    MRN: [id number]                              Today's Date: 2025       Admit Date: 2025    Visit Dx: No diagnosis found.  Patient Active Problem List   Diagnosis    Status post knee replacement    COPD (chronic obstructive pulmonary disease)    CAD (coronary artery disease)    Tobacco abuse    HTN (hypertension)    Type 2 diabetes mellitus with hyperglycemia, without long-term current use of insulin    Peripheral neuropathy    Chronic back pain    Cellulitis of toe of right foot    Bipolar disorder, current episode depressed, mild    Osteomyelitis of toe    Knee joint replacement status    Obesity (BMI 30-39.9)    S/P revision of total knee    Postoperative wound cellulitis     Past Medical History:   Diagnosis Date    Arthritis     Asthma     COPD (chronic obstructive pulmonary disease)     Degenerative disc disease, lumbar     CHRONIC    Diabetes mellitus     GERD (gastroesophageal reflux disease)     Hyperlipidemia     Hypertension     Neuropathy     CONOR FEET    Osteoarthritis     LEFT KNEE    Positive colorectal cancer screening using Cologuard test     PVD (peripheral vascular disease)     Seasonal allergies     Sleep apnea     NO CURRENT DEVICE     Past Surgical History:   Procedure Laterality Date    AMPUTATION DIGIT Right 2024    Procedure: SECOND AMPUTATION DIGIT, RIGHT FOOT;  Surgeon: Turner Portillo DPM;  Location: [facility];  Service: Podiatry;  Laterality: Right;    APPENDECTOMY      ARTHROPLASTY Right     KNEE    ECTOPIC PREGNANCY      JOINT REPLACEMENT Bilateral     KNEE ARTHROPLASTY    LAPAROSCOPIC TUBAL LIGATION      TOTAL KNEE ARTHROPLASTY REVISION Left 10/02/2023    Procedure: TOTAL KNEE ARTHROPLASTY REVISION WITH CORI ROBOT;  Surgeon: Luis Ansari II, MD;  Location: [facility];  Service: Robotics - Ortho;  Laterality: Left;    TOTAL KNEE ARTHROPLASTY REVISION Right 2025    Procedure: TOTAL KNEE ARTHROPLASTY  REVISION WITH CORI ROBOT;  Surgeon: Luis Ansari II, MD;  [place] [facility] [place] [facility] [place]  Service: [place]opedics;  Laterality: Right;    WISDOM TOOTH EXTRACTION        General Information       Row Name 05/12/25 1043          Physical Therapy Time and Intention    Document Type evaluation  -ER     Mode of Treatment individual therapy;physical therapy  -ER       Row Name 05/12/25 1043          General Information    Patient Profile Reviewed yes  -ER     Prior Level of Function independent:  -ER     Existing Precautions/Restrictions --  WBAT RLE  -ER       Row Name 05/12/25 1043          Living Environment    Current Living Arrangements home  -ER     People in Home alone  -ER       Row Name 05/12/25 1043          Home Main Entrance    Number of Stairs, Main Entrance none  -ER       Row Name 05/12/25 1043          Stairs Within Home, Primary    Number of Stairs, Within Home, Primary none  -ER       Row Name 05/12/25 1043          Cognition    Orientation Status (Cognition) oriented x 4  -ER       Row Name 05/12/25 1043          Safety Issues/Impairments Affecting Functional Mobility    Impairments Affecting Function (Mobility) balance;endurance/activity tolerance;pain;strength  -ER               User Key  (r) = Recorded By, (t) = Taken By, (c) = Cosigned By      Initials Name Provider Type    ER Allison Deleon, PT Physical Therapist                   Mobility       Row Name 05/12/25 1044          Bed Mobility    Bed Mobility bed mobility (all) activities  -ER     All Activities, [place] (Bed Mobility) modified independence  -ER       Row Name 05/12/25 1044          Sit-Stand Transfer    Sit-Stand [place] (Transfers) modified independence  -ER     Assistive Device (Sit-Stand Transfers) walker, front-wheeled  -ER       Row Name 05/12/25 1044          Gait/Stairs (Locomotion)    [place] Level (Gait) standby assist;supervision  -ER     Assistive Device (Gait) walker, front-wheeled  -ER     Patient was able  to Ambulate yes  -ER     Distance in Feet (Gait) 8  -ER     Deviations/Abnormal Patterns (Gait) bilateral deviations;gait speed decreased;stride length decreased  -ER     Bilateral Gait Deviations heel strike decreased;forward flexed posture  -ER       [facility] Name 05/12/25 1044          Mobility    Extremity Weight-bearing Status right lower extremity  -ER     Right Lower Extremity (Weight-bearing Status) weight-bearing as tolerated (WBAT)  -ER               User Key  (r) = Recorded By, (t) = Taken By, (c) = Cosigned By      Initials Name Provider Type    ER Allison Deleon, PT Physical Therapist                   Obj/Interventions       [facility] Name 05/12/25 1044          Range of Motion Comprehensive    Comment, General Range of Motion 90 deg of knee flexion on RLE  -ER       Row Name 05/12/25 1044          Strength Comprehensive (MMT)    Comment, General Manual Muscle Testing (MMT) Assessment Generalized weakness  -ER       Row Name 05/12/25 1044          Balance    Balance Assessment sitting static balance;sitting dynamic balance;standing static balance;standing dynamic balance  -ER     Static Sitting Balance independent  -ER     Dynamic Sitting Balance independent  -ER     Position, Sitting Balance sitting edge of bed;unsupported  -ER     Static Standing Balance standby assist  -ER     Dynamic Standing Balance supervision  -ER     Position/Device Used, Standing Balance supported;walker, front-wheeled  -ER     Balance Interventions sitting;standing;sit to stand;supported;static;dynamic  -ER       Row Name 05/12/25 1044          Sensory Assessment (Somatosensory)    Sensory Assessment (Somatosensory) sensation intact  -ER               User Key  (r) = Recorded By, (t) = Taken By, (c) = Cosigned By      Initials Name Provider Type    ER Allison Deleon, PT Physical Therapist                   Goals/Plan    No documentation.                  Clinical Impression       [facility] Name 05/12/25 1044          Pain    Pretreatment Pain  "Rating 9/10  -ER     Posttreatment Pain Rating 9/10  -ER     Pain Location knee  -ER     Pain Side/Orientation right  -ER     Pain Management Interventions activity modification encouraged  -ER     Response to Pain Interventions activity and movement patterns unchanged  -ER       Row Name 05/12/25 1044          Plan of Care Review    Plan of Care Reviewed With patient  -ER     Outcome Evaluation Yenny Crawford is a 59 year old female seen for physical therapy evaluation s/p admission for suspected joint infection. Pt. had R TKA done on April 28th 2024, and notes that she went to the ED due to severe pain, discoloration and \"puffyness\" of her incision. She notes she did see some drainage as well originally. She notes that this makes her uneasy. According to chart, her WBC count is normal, incision is dry/intact and absent of any drainage, normal redness and warmth as well. Today, I did see her incision, and it appears clean, dry and intact. She performs bed mobility and STS with mod indep, and ambulates 8 feet to bedside chair with standby-supervision A using RW. Knee flexion on R side is 90 in sitting. D/c orders signed, however pt. reports that she lives alone and is nervous about being able to care for herself. I do not necessarily think she needs rehab, I would recommend home with HH, and follow up outpatient as needed for symptom management.  -ER       Row Name 05/12/25 1044          Therapy Assessment/Plan (PT)    Criteria for Skilled Interventions Met (PT) no;no problems identified which require skilled intervention;does not meet criteria for skilled intervention  -ER     Therapy Frequency (PT) evaluation only  -ER       Row Name 05/12/25 1042          Positioning and Restraints    Pre-Treatment Position in bed  -ER     Post Treatment Position chair  -ER     In Chair notified nsg;call light within reach;encouraged to call for assist;exit alarm on  -ER               User Key  (r) = Recorded By, (t) = Taken By, " (c) = Cosigned By      Initials Name Provider Type    ER Allison Deleon PT Physical Therapist                   Outcome Measures       Row Name 05/12/25 1048          How much help from another person do you currently need...    Turning from your back to your side while in flat bed without using bedrails? 4  -ER     Moving from lying on back to sitting on the side of a flat bed without bedrails? 4  -ER     Moving to and from a bed to a chair (including a wheelchair)? 4  -ER     Standing up from a chair using your arms (e.g., wheelchair, bedside chair)? 3  -ER     Climbing 3-5 steps with a railing? 3  -ER     To walk in hospital room? 3  -ER     AM-PAC 6 Clicks Score (PT) 21  -ER     Highest Level of Mobility Goal 6 --> Walk 10 steps or more  -ER       Row Name 05/12/25 1048          Functional Assessment    Outcome Measure Options AM-PAC 6 Clicks Basic Mobility (PT)  -ER               User Key  (r) = Recorded By, (t) = Taken By, (c) = Cosigned By      Initials Name Provider Type    ER Allison Deleon PT Physical Therapist                                 Physical Therapy Education       Title: PT OT SLP Therapies (Done)       Topic: Physical Therapy (Done)       Point: Mobility training (Done)       Learning Progress Summary            Patient Acceptance, E, VU by ER at 5/12/2025 1048                      Point: Home exercise program (Done)       Learning Progress Summary            Patient Acceptance, E, VU by ER at 5/12/2025 1048                      Point: Body mechanics (Done)       Learning Progress Summary            Patient Acceptance, E, VU by ER at 5/12/2025 1048                      Point: Precautions (Done)       Learning Progress Summary            Patient Acceptance, E, VU by ER at 5/12/2025 1048                                      User Key       Initials Effective Dates Name Provider Type Discipline    ER 10/15/23 -  Allison Deleon PT Physical Therapist PT                  PT Recommendation and Plan    "  Outcome Evaluation: Yenny Crawford is a 59 year old female seen for physical therapy evaluation s/p admission for suspected joint infection. Pt. had R TKA done on April 28th 2024, and notes that she went to the ED due to severe pain, discoloration and \"puffyness\" of her incision. She notes she did see some drainage as well originally. She notes that this makes her uneasy. According to chart, her WBC count is normal, incision is dry/intact and absent of any drainage, normal redness and warmth as well. Today, I did see her incision, and it appears clean, dry and intact. She performs bed mobility and STS with mod indep, and ambulates 8 feet to bedside chair with standby-supervision A using RW. Knee flexion on R side is 90 in sitting. D/c orders signed, however pt. reports that she lives alone and is nervous about being able to care for herself. I do not necessarily think she needs rehab, I would recommend home with HH, and follow up outpatient as needed for symptom management.     Time Calculation:         PT Charges       Row Name 05/12/25 1048             Time Calculation    Start Time 0943  -ER      Stop Time 1008  -ER      Time Calculation (min) 25 min  -ER      PT Received On 05/12/25  -ER         Time Calculation- PT    Total Timed Code Minutes- PT 23 minute(s)  -ER         Timed Charges    88118 - PT Therapeutic Activity Minutes 23  -ER         Total Minutes    Timed Charges Total Minutes 23  -ER       Total Minutes 23  -ER                User Key  (r) = Recorded By, (t) = Taken By, (c) = Cosigned By      Initials Name Provider Type    ER Allison Deleon, PT Physical Therapist                  Therapy Charges for Today       Code Description Service Date Service Provider Modifiers Qty    62934594323 HC PT THERAPEUTIC ACT EA 15 MIN 5/12/2025 Allison Deleon, PT GP 2    69076081641 HC PT EVAL MOD COMPLEXITY 3 5/12/2025 Allison Deleon, PT GP 1            PT G-Codes  Outcome Measure Options: AM-PAC 6 Clicks Basic Mobility " (PT)  AM-PAC 6 Clicks Score (PT): 21  PT Discharge Summary  Anticipated Discharge Disposition (PT): home with home health    Allison Deleon, PT  5/12/2025

## 2025-05-12 NOTE — CASE MANAGEMENT/SOCIAL WORK
Continued Stay Note  [facility]     Patient Name: Yenny Crawford  MRN: [id number]  Today's Date: 5/12/2025    Admit Date: 5/11/2025        Discharge Plan       Row Name 05/12/25 1433       Plan    Final Discharge Disposition Code 01 - home or self-care    Final Note Dc home with current outpatient PT                   Discharge Codes    No documentation.                 Expected Discharge Date and Time       Expected Discharge Date Expected Discharge Time    May 12, 2025               ALICE Flynn

## 2025-05-23 ENCOUNTER — READMISSION MANAGEMENT (OUTPATIENT)
Dept: CALL CENTER | Facility: HOSPITAL | Age: 60
End: 2025-05-23
Payer: COMMERCIAL

## 2025-05-23 NOTE — OUTREACH NOTE
Medical Week 2 Survey      Flowsheet Row Responses   [facility] [place], operated by [facility] patient discharged from? [place]   Does the patient have one of the following disease processes/diagnoses(primary or secondary)? Other   Week 2 attempt successful? Yes   Call start time 1344   Discharge diagnosis Postoperative wound cellulitis   Call end time 1352   Person spoke with today (if not patient) and relationship pt   Meds reviewed with patient/caregiver? Yes   Is the patient having any side effects they believe may be caused by any medication additions or changes? No   Does the patient have all medications ordered at discharge? Yes   Is the patient taking all medications as directed (includes completed medication regime)? Yes   Does the patient have a primary care provider?  Yes   Does the patient have an appointment with their PCP within 7 days of discharge? Yes   Has the patient kept scheduled appointments due by today? Yes   Psychosocial issues? No   What is the patient's perception of their health status since discharge? Improving   Is the patient/caregiver able to teach back signs and symptoms related to disease process for when to call PCP? Yes   Is the patient/caregiver able to teach back signs and symptoms related to disease process for when to call 911? Yes   Is the patient/caregiver able to teach back the hierarchy of who to call/visit for symptoms/problems? PCP, Specialist, Home health nurse, Urgent Care, ED, 911 Yes   Week 2 Call Completed? Yes   Is the patient interested in additional calls from an ambulatory ? No   Would this patient benefit from a Referral to [facility] Social Work? No   Wrap up additional comments Pt states she is doing better, and had staples removed from right knee. Pt shares she was cleaning with alcohol. Pt shares incision area feels warm but not red. Pt advised not to put anything on incision unless instructed by surgeon. Pt verbalized understanding.   Call end time 1352            Bernadette RIVERS  - Registered Nurse

## 2025-05-27 ENCOUNTER — TRANSCRIBE ORDERS (OUTPATIENT)
Dept: ADMINISTRATIVE | Facility: HOSPITAL | Age: 60
End: 2025-05-27
Payer: COMMERCIAL

## 2025-05-27 DIAGNOSIS — Z13.820 OSTEOPOROSIS SCREENING: Primary | ICD-10-CM

## 2025-06-02 ENCOUNTER — READMISSION MANAGEMENT (OUTPATIENT)
Dept: CALL CENTER | Facility: HOSPITAL | Age: 60
End: 2025-06-02
Payer: COMMERCIAL

## 2025-06-02 NOTE — OUTREACH NOTE
Medical Week 3 Survey      Flowsheet Row Responses   [facility] patient discharged from? [place]   Does the patient have one of the following disease processes/diagnoses(primary or secondary)? Other   Week 3 attempt successful? Yes   Call start time 1227   Call end time 1230   Discharge diagnosis Postoperative wound cellulitis   Person spoke with today (if not patient) and relationship Patient   Does the patient have a primary care provider?  Yes   Comments regarding PCP next appt is in july with MDs.   What is the Home health agency?  [facility] comments  no longer coming to the home.   Psychosocial issues? No   Did the patient receive a copy of their discharge instructions? Yes   Nursing interventions Reviewed instructions with patient   What is the patient's perception of their health status since discharge? Improving   Is the patient/caregiver able to teach back signs and symptoms related to disease process for when to call PCP? Yes   Is the patient/caregiver able to teach back signs and symptoms related to disease process for when to call 911? Yes   Is the patient/caregiver able to teach back the hierarchy of who to call/visit for symptoms/problems? PCP, Specialist, Home health nurse, Urgent Care, ED, 911 Yes   Week 3 Call Completed? Yes   Graduated Yes   Wrap up additional comments Patient reports doing okay, still having pain, swollen around thigh/ knee. Hot to touch around incision- staples removed. seen surgeon on 05/19. Advised to see pcp in regards to pain and swelling of leg. Patient understands.   Call end time 1230            Jordyn BENITO - Registered Nurse

## (undated) DEVICE — STPLR SKIN VISISTAT WD 35CT

## (undated) DEVICE — TRAP FLD MINIVAC MEGADYNE 100ML

## (undated) DEVICE — 3M™ IOBAN™ 2 ANTIMICROBIAL INCISE DRAPE 6650EZ: Brand: IOBAN™ 2

## (undated) DEVICE — APPL CHLORAPREP HI/LITE 26ML ORNG

## (undated) DEVICE — PREP SOL POVIDONE/IODINE BT 4OZ

## (undated) DEVICE — NEEDLE, QUINCKE, 20GX3.5": Brand: MEDLINE

## (undated) DEVICE — DECANTER BAG 9": Brand: MEDLINE INDUSTRIES, INC.

## (undated) DEVICE — PICO 7 10CM X 30CM: Brand: PICO™ 7

## (undated) DEVICE — SUT ETHIB 2 CV V37 MS/4 30IN MX69G

## (undated) DEVICE — PADDING,UNDERCAST,COTTON, 3X4YD STERILE: Brand: MEDLINE

## (undated) DEVICE — BNDG,ELSTC,MATRIX,STRL,4"X5YD,LF,HOOK&LP: Brand: MEDLINE

## (undated) DEVICE — SYR LUERLOK 30CC

## (undated) DEVICE — SUT ETHLN 3/0 PS1 18IN 1663H

## (undated) DEVICE — PK KN TOTL 40

## (undated) DEVICE — PATIENT RETURN ELECTRODE, SINGLE-USE, CONTACT QUALITY MONITORING, ADULT, WITH 9FT CORD, FOR PATIENTS WEIGING OVER 33LBS. (15KG): Brand: MEGADYNE

## (undated) DEVICE — CEMENT MIXING SYSTEM WITH MIS FEMORAL BREAKAWAY NOZZLE: Brand: REVOLUTION

## (undated) DEVICE — TBG PENCL TELESCP MEGADYNE SMOKE EVAC 10FT

## (undated) DEVICE — HANDPIECE SET WITH COAXIAL HIGH FLOW TIP AND SUCTION TUBE: Brand: INTERPULSE

## (undated) DEVICE — PENCL SMOKE/EVAC MEGADYNE TELESCP 10FT

## (undated) DEVICE — 450 ML BOTTLE OF 0.05% CHLORHEXIDINE GLUCONATE IN 99.95% STERILE WATER FOR IRRIGATION, USP AND APPLICATOR.: Brand: IRRISEPT ANTIMICROBIAL WOUND LAVAGE

## (undated) DEVICE — DUAL CUT SAGITTAL BLADE

## (undated) DEVICE — COAXIAL FEMORAL CANAL TIP

## (undated) DEVICE — CONTAINER,SPECIMEN,OR STERILE,4OZ: Brand: MEDLINE

## (undated) DEVICE — BNDG ELAS ELITE V/CLOSE 6IN 5YD LF STRL

## (undated) DEVICE — GLV SURG SIGNATURE ESSENTIAL PF LTX SZ8.5

## (undated) DEVICE — DRAPE,U/ SHT,SPLIT,PLAS,STERIL: Brand: MEDLINE

## (undated) DEVICE — REAL INTELLIGENCE 5  MM                                    CYLINDRICAL BUR: Brand: REAL INTELLIGENCE

## (undated) DEVICE — SOL NACL 0.9PCT 1000ML

## (undated) DEVICE — PRECISION THIN (9.0 X 0.38 X 25.0MM)

## (undated) DEVICE — BNDG ESMARK STRL 6INX12FT LF

## (undated) DEVICE — SOL IRR NACL 0.9PCT 3000ML

## (undated) DEVICE — BNDG ELAS ELITE V/CLOSE 4IN 5YD LF STRL

## (undated) DEVICE — DRESSING,GAUZE,XEROFORM,CURAD,1"X8",ST: Brand: CURAD

## (undated) DEVICE — BNDG,ELSTC,MATRIX,STRL,6"X5YD,LF,HOOK&LP: Brand: MEDLINE

## (undated) DEVICE — PK ORTHO MINOR TOWER 40

## (undated) DEVICE — NDL HYPO PRECISIONGLIDE REG 25G 1 1/2

## (undated) DEVICE — STOCKINETTE,IMPERVIOUS,12X48,STERILE: Brand: MEDLINE

## (undated) DEVICE — BANDAGE,GAUZE,CONFORMING,4"X75",STRL,LF: Brand: MEDLINE

## (undated) DEVICE — NON RIMMED SPEED PIN 65MM STERILE

## (undated) DEVICE — SOL ISO/ALC 70PCT 4OZ

## (undated) DEVICE — GLV SURG SENSICARE PI LF PF 7.5 GRN STRL

## (undated) DEVICE — COVER,MAYO STAND,STERILE: Brand: MEDLINE

## (undated) DEVICE — NAVIO FLAT MARKERS: Brand: NAVIO

## (undated) DEVICE — DRSNG TELFA PAD NONADH STR 1S 3X4IN

## (undated) DEVICE — ANTIBACTERIAL UNDYED BRAIDED (POLYGLACTIN 910), SYNTHETIC ABSORBABLE SUTURE: Brand: COATED VICRYL

## (undated) DEVICE — GLV SURG PREMIERPRO ORTHO LTX PF SZ8.5 BRN

## (undated) DEVICE — DRSNG BURN ACTICOAT FLEX 7 1X24IN

## (undated) DEVICE — GENESIS TROCHLEAR PIN 1/8 X 3: Brand: GENESIS

## (undated) DEVICE — SUCTION MAT (LOW PROFILE), 50X34: Brand: NEPTUNE

## (undated) DEVICE — SUT ETHLN 2/0 PS 18IN 585H

## (undated) DEVICE — SPNG LAP 18X18IN LF STRL PK/5

## (undated) DEVICE — BNDG ELAS CO-FLEX SLF ADHR 4IN5YD LF STRL

## (undated) DEVICE — Device

## (undated) DEVICE — GLV SURG SENSICARE PI MIC PF SZ7 LF STRL

## (undated) DEVICE — SUT VIC 2/0 CT2 27IN J269H

## (undated) DEVICE — 3M™ IOBAN™ 2 ANTIMICROBIAL INCISE DRAPE 6651EZ: Brand: IOBAN™ 2